# Patient Record
Sex: FEMALE | Race: WHITE | NOT HISPANIC OR LATINO | Employment: OTHER | URBAN - METROPOLITAN AREA
[De-identification: names, ages, dates, MRNs, and addresses within clinical notes are randomized per-mention and may not be internally consistent; named-entity substitution may affect disease eponyms.]

---

## 2018-01-31 ENCOUNTER — OFFICE VISIT (OUTPATIENT)
Dept: INTERNAL MEDICINE | Facility: CLINIC | Age: 53
End: 2018-01-31

## 2018-01-31 VITALS
SYSTOLIC BLOOD PRESSURE: 100 MMHG | BODY MASS INDEX: 26.09 KG/M2 | HEIGHT: 64 IN | DIASTOLIC BLOOD PRESSURE: 64 MMHG | WEIGHT: 152.8 LBS | TEMPERATURE: 98.9 F | RESPIRATION RATE: 16 BRPM | HEART RATE: 74 BPM

## 2018-01-31 DIAGNOSIS — G47.00 INSOMNIA, UNSPECIFIED TYPE: ICD-10-CM

## 2018-01-31 DIAGNOSIS — R07.9 CHEST PAIN, UNSPECIFIED TYPE: ICD-10-CM

## 2018-01-31 DIAGNOSIS — R06.83 SNORING: ICD-10-CM

## 2018-01-31 DIAGNOSIS — F41.9 ANXIETY: Primary | ICD-10-CM

## 2018-01-31 DIAGNOSIS — R53.83 FATIGUE, UNSPECIFIED TYPE: ICD-10-CM

## 2018-01-31 DIAGNOSIS — N60.19 FIBROCYSTIC BREAST DISEASE (FCBD), UNSPECIFIED LATERALITY: ICD-10-CM

## 2018-01-31 DIAGNOSIS — R20.2 TINGLING SENSATION: ICD-10-CM

## 2018-01-31 DIAGNOSIS — Z83.3 FHX: DIABETES MELLITUS: ICD-10-CM

## 2018-01-31 DIAGNOSIS — F17.200 SMOKER: ICD-10-CM

## 2018-01-31 DIAGNOSIS — M54.9 MID BACK PAIN: ICD-10-CM

## 2018-01-31 LAB
ALBUMIN SERPL-MCNC: 4.5 G/DL (ref 3.2–4.8)
ALBUMIN/GLOB SERPL: 1.7 G/DL (ref 1.5–2.5)
ALP SERPL-CCNC: 98 U/L (ref 25–100)
ALT SERPL W P-5'-P-CCNC: 25 U/L (ref 7–40)
ANION GAP SERPL CALCULATED.3IONS-SCNC: 4 MMOL/L (ref 3–11)
AST SERPL-CCNC: 25 U/L (ref 0–33)
BASOPHILS # BLD AUTO: 0.02 10*3/MM3 (ref 0–0.2)
BASOPHILS NFR BLD AUTO: 0.2 % (ref 0–1)
BILIRUB BLD-MCNC: NEGATIVE MG/DL
BILIRUB SERPL-MCNC: 0.7 MG/DL (ref 0.3–1.2)
BUN BLD-MCNC: 15 MG/DL (ref 9–23)
BUN/CREAT SERPL: 18.8 (ref 7–25)
CALCIUM SPEC-SCNC: 9.4 MG/DL (ref 8.7–10.4)
CHLORIDE SERPL-SCNC: 109 MMOL/L (ref 99–109)
CLARITY, POC: CLEAR
CO2 SERPL-SCNC: 27 MMOL/L (ref 20–31)
COLOR UR: YELLOW
CREAT BLD-MCNC: 0.8 MG/DL (ref 0.6–1.3)
DEPRECATED RDW RBC AUTO: 48 FL (ref 37–54)
EOSINOPHIL # BLD AUTO: 0.05 10*3/MM3 (ref 0–0.3)
EOSINOPHIL NFR BLD AUTO: 0.5 % (ref 0–3)
ERYTHROCYTE [DISTWIDTH] IN BLOOD BY AUTOMATED COUNT: 13.4 % (ref 11.3–14.5)
EXPIRATION DATE: NORMAL
GFR SERPL CREATININE-BSD FRML MDRD: 76 ML/MIN/1.73
GLOBULIN UR ELPH-MCNC: 2.7 GM/DL
GLUCOSE BLD-MCNC: 88 MG/DL (ref 70–100)
GLUCOSE UR STRIP-MCNC: NEGATIVE MG/DL
HCT VFR BLD AUTO: 43.2 % (ref 34.5–44)
HGB BLD-MCNC: 14.5 G/DL (ref 11.5–15.5)
IMM GRANULOCYTES # BLD: 0.01 10*3/MM3 (ref 0–0.03)
IMM GRANULOCYTES NFR BLD: 0.1 % (ref 0–0.6)
KETONES UR QL: NEGATIVE
LEUKOCYTE EST, POC: NEGATIVE
LYMPHOCYTES # BLD AUTO: 2.94 10*3/MM3 (ref 0.6–4.8)
LYMPHOCYTES NFR BLD AUTO: 31.5 % (ref 24–44)
Lab: NORMAL
MCH RBC QN AUTO: 33.1 PG (ref 27–31)
MCHC RBC AUTO-ENTMCNC: 33.6 G/DL (ref 32–36)
MCV RBC AUTO: 98.6 FL (ref 80–99)
MONOCYTES # BLD AUTO: 0.5 10*3/MM3 (ref 0–1)
MONOCYTES NFR BLD AUTO: 5.4 % (ref 0–12)
NEUTROPHILS # BLD AUTO: 5.8 10*3/MM3 (ref 1.5–8.3)
NEUTROPHILS NFR BLD AUTO: 62.3 % (ref 41–71)
NITRITE UR-MCNC: NEGATIVE MG/ML
PH UR: 5 [PH] (ref 5–8)
PLATELET # BLD AUTO: 223 10*3/MM3 (ref 150–450)
PMV BLD AUTO: 10.1 FL (ref 6–12)
POTASSIUM BLD-SCNC: 4.1 MMOL/L (ref 3.5–5.5)
PROT SERPL-MCNC: 7.2 G/DL (ref 5.7–8.2)
PROT UR STRIP-MCNC: NEGATIVE MG/DL
RBC # BLD AUTO: 4.38 10*6/MM3 (ref 3.89–5.14)
RBC # UR STRIP: NEGATIVE /UL
SODIUM BLD-SCNC: 140 MMOL/L (ref 132–146)
SP GR UR: 1 (ref 1–1.03)
TSH SERPL DL<=0.05 MIU/L-ACNC: 1.11 MIU/ML (ref 0.35–5.35)
UROBILINOGEN UR QL: NORMAL
VIT B12 BLD-MCNC: 586 PG/ML (ref 211–911)
WBC NRBC COR # BLD: 9.32 10*3/MM3 (ref 3.5–10.8)

## 2018-01-31 PROCEDURE — 99204 OFFICE O/P NEW MOD 45 MIN: CPT | Performed by: NURSE PRACTITIONER

## 2018-01-31 PROCEDURE — 80053 COMPREHEN METABOLIC PANEL: CPT | Performed by: NURSE PRACTITIONER

## 2018-01-31 PROCEDURE — 85025 COMPLETE CBC W/AUTO DIFF WBC: CPT | Performed by: NURSE PRACTITIONER

## 2018-01-31 PROCEDURE — 84443 ASSAY THYROID STIM HORMONE: CPT | Performed by: NURSE PRACTITIONER

## 2018-01-31 PROCEDURE — 99407 BEHAV CHNG SMOKING > 10 MIN: CPT | Performed by: NURSE PRACTITIONER

## 2018-01-31 PROCEDURE — 93000 ELECTROCARDIOGRAM COMPLETE: CPT | Performed by: NURSE PRACTITIONER

## 2018-01-31 PROCEDURE — 82607 VITAMIN B-12: CPT | Performed by: NURSE PRACTITIONER

## 2018-01-31 RX ORDER — BUPROPION HYDROCHLORIDE 150 MG/1
150 TABLET ORAL DAILY
Qty: 30 TABLET | Refills: 0 | Status: SHIPPED | OUTPATIENT
Start: 2018-01-31 | End: 2019-01-29

## 2018-01-31 RX ORDER — ASPIRIN 81 MG/1
81 TABLET, CHEWABLE ORAL DAILY PRN
COMMUNITY
End: 2020-03-17

## 2018-01-31 RX ORDER — ASPIRIN 325 MG
325 TABLET ORAL DAILY PRN
COMMUNITY
End: 2018-08-15

## 2018-01-31 RX ORDER — AMOXICILLIN 250 MG
1 CAPSULE ORAL DAILY PRN
COMMUNITY
End: 2019-01-29

## 2018-02-01 ENCOUNTER — TELEPHONE (OUTPATIENT)
Dept: INTERNAL MEDICINE | Facility: CLINIC | Age: 53
End: 2018-02-01

## 2018-02-01 NOTE — TELEPHONE ENCOUNTER
----- Message from Layla Pardo V sent at 2/1/2018  8:06 AM EST -----  Pt called and is asking for lab results from 1/31/18.    Please call her at [phone]

## 2018-02-14 ENCOUNTER — TELEPHONE (OUTPATIENT)
Dept: INTERNAL MEDICINE | Facility: CLINIC | Age: 53
End: 2018-02-14

## 2018-02-14 ENCOUNTER — OFFICE VISIT (OUTPATIENT)
Dept: INTERNAL MEDICINE | Facility: CLINIC | Age: 53
End: 2018-02-14

## 2018-02-14 VITALS
TEMPERATURE: 98.9 F | BODY MASS INDEX: 26.5 KG/M2 | WEIGHT: 154.4 LBS | HEART RATE: 74 BPM | RESPIRATION RATE: 18 BRPM | DIASTOLIC BLOOD PRESSURE: 70 MMHG | SYSTOLIC BLOOD PRESSURE: 92 MMHG

## 2018-02-14 DIAGNOSIS — R06.83 SNORES: ICD-10-CM

## 2018-02-14 DIAGNOSIS — F17.200 SMOKER: ICD-10-CM

## 2018-02-14 DIAGNOSIS — R53.83 FATIGUE, UNSPECIFIED TYPE: Primary | ICD-10-CM

## 2018-02-14 PROCEDURE — 99406 BEHAV CHNG SMOKING 3-10 MIN: CPT | Performed by: NURSE PRACTITIONER

## 2018-02-14 PROCEDURE — 99213 OFFICE O/P EST LOW 20 MIN: CPT | Performed by: NURSE PRACTITIONER

## 2018-02-14 NOTE — PROGRESS NOTES
Chief Complaint   Patient presents with   • Anxiety     2 week follow up        Subjective     History of Present Illness       Seen 2 wk ago to [facility].    Pt had anx/dep  Cp ekg stable -pain in chronic  Labs cmp cbc  tsh reviewed wnl  Back pain  TA 1ppd    Snores/sleep disturubance    fatigue 3-4months unchanged    Did see derm had cryo to areas. Sees agin one yr    epworth sleep study completed and pt positive risk for CHANTE       The following portions of the patient's history were reviewed and updated as appropriate: allergies, current medications, past family history, past medical history, past social history, past surgical history and problem list.    Review of Systems   Constitutional: Positive for fatigue.   All other systems reviewed and are negative.          Objective   Physical Exam   Constitutional: She is oriented to person, place, and time. She appears well-developed and well-nourished.   HENT:   Head: Normocephalic and atraumatic.   Right Ear: Hearing, tympanic membrane, external ear and ear canal normal.   Left Ear: Hearing, tympanic membrane, external ear and ear canal normal.   Nose: Nose normal. Right sinus exhibits no maxillary sinus tenderness and no frontal sinus tenderness. Left sinus exhibits no maxillary sinus tenderness and no frontal sinus tenderness.   Mouth/Throat: Uvula is midline, oropharynx is clear and moist and mucous membranes are normal.   Eyes: Conjunctivae are normal. Pupils are equal, round, and reactive to light. Right eye exhibits no discharge. Left eye exhibits no discharge. No scleral icterus.   Neck: Normal range of motion. Neck supple. No thyromegaly present.   Cardiovascular: Normal rate, regular rhythm, normal heart sounds and intact distal pulses.  Exam reveals no friction rub.    No murmur heard.  Pulmonary/Chest: Effort normal and breath sounds normal.   Abdominal: Soft. Bowel sounds are normal. She exhibits no distension and no mass. There is no tenderness. There  is no rebound and no guarding. No hernia.   Musculoskeletal: Normal range of motion. She exhibits no edema.   Lymphadenopathy:     She has no cervical adenopathy.   Neurological: She is alert and oriented to person, place, and time. She has normal reflexes.   Skin: Skin is warm and dry.   Psychiatric: She has a normal mood and affect. Her behavior is normal. Judgment and thought content normal.   Nursing note and vitals reviewed.           Assessment/Plan   Lisa was seen today for anxiety.    Diagnoses and all orders for this visit:    Fatigue, unspecified type  -     Home Sleep Study; Future    Snores  -     Home Sleep Study; Future    Smoking cessation with 3-10 minutes is discussion regarding the risk of continued smoking and benefits of cessation and reduction option vs cessation and meds to use with cessation were reviewed.  Goal to reduce cigarettes by 3 per day were set pt v/u.     Reviewed labs with pt   Start Wellbutrin for current symptoms which could also help with Tobacco cessation  Set up sleep study if fatigue persist with possible treatment then further eval.      Return in about 6 weeks (around 3/28/2018) for fasting, Annual.  RTC/call  If symptoms worsen  Meds MOA and SE's reviewed and pt v/u

## 2018-03-06 ENCOUNTER — HOSPITAL ENCOUNTER (OUTPATIENT)
Dept: SLEEP MEDICINE | Facility: HOSPITAL | Age: 53
Discharge: HOME | End: 2018-03-06
Admitting: NURSE PRACTITIONER

## 2018-03-06 VITALS
HEART RATE: 67 BPM | BODY MASS INDEX: 27.5 KG/M2 | HEIGHT: 63 IN | SYSTOLIC BLOOD PRESSURE: 99 MMHG | DIASTOLIC BLOOD PRESSURE: 63 MMHG | OXYGEN SATURATION: 97 % | WEIGHT: 155.2 LBS

## 2018-03-06 DIAGNOSIS — R53.83 FATIGUE, UNSPECIFIED TYPE: ICD-10-CM

## 2018-03-06 DIAGNOSIS — R06.83 SNORES: ICD-10-CM

## 2018-03-06 PROCEDURE — 95806 SLEEP STUDY UNATT&RESP EFFT: CPT

## 2018-03-06 PROCEDURE — 95806 SLEEP STUDY UNATT&RESP EFFT: CPT | Performed by: INTERNAL MEDICINE

## 2018-03-16 ENCOUNTER — CONSULT (OUTPATIENT)
Dept: SLEEP MEDICINE | Facility: HOSPITAL | Age: 53
End: 2018-03-16

## 2018-03-16 VITALS
SYSTOLIC BLOOD PRESSURE: 112 MMHG | BODY MASS INDEX: 26.26 KG/M2 | HEIGHT: 64 IN | DIASTOLIC BLOOD PRESSURE: 61 MMHG | HEART RATE: 87 BPM | WEIGHT: 153.8 LBS | OXYGEN SATURATION: 98 %

## 2018-03-16 DIAGNOSIS — F51.04 PSYCHOPHYSIOLOGICAL INSOMNIA: ICD-10-CM

## 2018-03-16 DIAGNOSIS — G47.33 OBSTRUCTIVE SLEEP APNEA, ADULT: ICD-10-CM

## 2018-03-16 PROCEDURE — 99204 OFFICE O/P NEW MOD 45 MIN: CPT | Performed by: INTERNAL MEDICINE

## 2018-03-17 NOTE — PROGRESS NOTES
Subjective   Lisa Limon is a 52 y.o. female is being seen for consultation today at the request of SUSAN Siegel  for the evaluation of trouble falling asleep and staying asleep.  She also has a history of snoring.    History of Present Illness  Patient complains of feeling exhausted all the time and not sleeping well.  She says she's noted this for at least 2 years.  Says once she gets to sleep she often has awakened almost every hour.  She's had snoring noted for 10 years.  She denies any told that she had apneas.  She does awaken gasping for breath.  She says she's not rested in the morning.  She has a morning headache 2-3 days per week.  She will fall asleep if sitting quietly during the day.  She denies any trouble while driving.  She is sleepy even if she increases her time in bed.    She has history of loud snoring she apparently snores in all positions.  She is awakened with a dry mouth and awakened choking with sore throat she has trouble breathing through her nose both day and night but denies ever breaking her nose.  She denies any reflux symptoms she denies hypnagogic hallucinations sleep paralysis.  She has occasional jerking of her legs at night but says it does not keep her awake.  She is been evaluated medically for her daytime fatigue set her blood work was okay.  She does have some back pain seems to bother her at night she occasionally awakens due to the pain.  Her weight has increased 15 pounds in the past year.    She goes to bed between 11 PM and 2 AM.  She says take her 1-2 hours to go to sleep.  She awakens almost every hour and she thinks she gets 3-4 hours of sleep.  She also occasionally naps for an hour and a half during the day.  She denies any history of hypertension diabetes or coronary artery disease.  She is not on any sleeping medications at this time.  Allergies   Allergen Reactions   • Other Anaphylaxis     IV Contrast   • Amoxicillin Rash and Other (See Comments)      Rash and yeast infection   • Hydrocodone-Acetaminophen Rash   • Tetanus Toxoids Rash and GI Intolerance          Current Outpatient Prescriptions:   •  aspirin 325 MG tablet, Take 325 mg by mouth Daily As Needed for Mild Pain ., Disp: , Rfl:   •  aspirin 81 MG chewable tablet, Chew 81 mg Daily As Needed for Mild Pain ., Disp: , Rfl:   •  Cobalamine Combinations (B-12) 100-5000 MCG sublingual tablet, Place 1 tablet under the tongue Daily As Needed., Disp: , Rfl:   •  Multiple Vitamins-Minerals (MULTIVITAMIN ADULT PO), Take 1 tablet by mouth Daily., Disp: , Rfl:   •  buPROPion XL (WELLBUTRIN XL) 150 MG 24 hr tablet, Take 1 tablet by mouth Daily., Disp: 30 tablet, Rfl: 0    Smoking status: Current Every Day Smoker                                                   Packs/day: 0.75     Years: 32.00      Smokeless tobacco: Never Used                           History   Alcohol Use No       Caffeine: She has one cola and 1 cup of decaf coffee per day.    Past Medical History:   Diagnosis Date   • Acid reflux    • Colon polyps    • Depression    • Gall stones    • Ovarian cyst    • Pancreatitis    • Ulcer    • UTI (urinary tract infection)        Past Surgical History:   Procedure Laterality Date   • CHOLECYSTECTOMY  2007   • HYSTERECTOMY  2010   • TUBAL ABDOMINAL LIGATION  1994       Family History   Problem Relation Age of Onset   • Migraines Mother    • Heart attack Father    • Diabetes Paternal Aunt    • Diabetes Paternal Uncle    Family history is also positive for COPD and her father has obstructive sleep apnea.    The following portions of the patient's history were reviewed and updated as appropriate: allergies, current medications, past family history, past medical history, past social history, past surgical history and problem list.    Review of Systems   Constitutional: Positive for fatigue and unexpected weight change.   HENT: Positive for ear discharge, ear pain and sinus pressure.    Eyes: Positive for visual  "disturbance.   Respiratory: Negative.    Cardiovascular: Positive for chest pain and leg swelling.   Gastrointestinal: Positive for constipation, nausea and vomiting.   Endocrine: Negative.    Genitourinary: Negative.    Musculoskeletal: Positive for arthralgias, back pain and myalgias.   Skin: Negative.    Allergic/Immunologic: Negative.    Neurological: Positive for dizziness, light-headedness, numbness and headaches.   Hematological: Negative.    Psychiatric/Behavioral: The patient is nervous/anxious.     Calvin scores 12/24    Objective     /61   Pulse 87   Ht 162.6 cm (64\")   Wt 69.8 kg (153 lb 12.8 oz)   SpO2 98%   BMI 26.40 kg/m²      Physical Exam   Constitutional: She is oriented to person, place, and time. She appears well-developed and well-nourished.   She is mildly overweight.   HENT:   Head: Normocephalic and atraumatic.   She has nasal airway narrowing with Mallampati class IV anatomy and mild retrognathia   Eyes: EOM are normal. Pupils are equal, round, and reactive to light.   Neck: Normal range of motion. Neck supple.   Cardiovascular: Normal rate, regular rhythm and normal heart sounds.    Pulmonary/Chest: Effort normal. She has wheezes.   She has a few slight expiratory wheezes   Abdominal: Soft. Bowel sounds are normal.   Musculoskeletal: Normal range of motion. She exhibits no edema.   Neurological: She is alert and oriented to person, place, and time.   Skin: Skin is warm and dry.   Psychiatric: She has a normal mood and affect. Her behavior is normal.    patient had home sleep testing on March 7.  Overall she had an AHI of 11.3.  This is consistent with mild obstructive sleep apnea.  When supine her index is 12.1.  On her left side, her index was 3.4.  On her right side her index was 15.6.  She had oxygen desaturations to 69% and spent 94 minutes or 25% of her time in bed in desaturated state.      Assessment/Plan   Lisa was seen today for sleeping problem.    Diagnoses and " all orders for this visit:    Obstructive sleep apnea, adult  -     Detailed AutoPAP Order    Psychophysiological insomnia     patient does have mild obstructive sleep apnea with significant nocturnal hypoxemia.  We've discussed possible therapies including CPAP, weight control, oral appliances, and surgery.  She is willing to try CPAP.  We will place her on an AutoSet device with the fullface mask.  We will have her return then in 2 months and we will download the device to make sure it is controlling her events.  If she is able to tolerate the CPAP we will check an overnight oximetry to make certain were correcting nocturnal hypoxemia.  She is encouraged to lose weight.  She is encouraged to avoid alcohol and sedatives close to bedtime.  She is encouraged practice lateral position sleep.  She is to contact us if symptoms worsen.         Kevyn Hodge MD [facility]  Sleep Medicine  Pulmonary and Critical Care Medicine

## 2018-07-04 ENCOUNTER — HOSPITAL ENCOUNTER (EMERGENCY)
Facility: HOSPITAL | Age: 53
Discharge: HOME | End: 2018-07-05
Attending: EMERGENCY MEDICINE | Admitting: EMERGENCY MEDICINE

## 2018-07-04 ENCOUNTER — APPOINTMENT (OUTPATIENT)
Dept: GENERAL RADIOLOGY | Facility: HOSPITAL | Age: 53
End: 2018-07-04

## 2018-07-04 DIAGNOSIS — K29.00 ACUTE SUPERFICIAL GASTRITIS WITHOUT HEMORRHAGE: Primary | ICD-10-CM

## 2018-07-04 LAB
ALBUMIN SERPL-MCNC: 4.3 G/DL (ref 3.2–4.8)
ALBUMIN/GLOB SERPL: 1.5 G/DL (ref 1.5–2.5)
ALP SERPL-CCNC: 106 U/L (ref 25–100)
ALT SERPL W P-5'-P-CCNC: 16 U/L (ref 7–40)
ANION GAP SERPL CALCULATED.3IONS-SCNC: 10 MMOL/L (ref 3–11)
AST SERPL-CCNC: 19 U/L (ref 0–33)
BACTERIA UR QL AUTO: NORMAL /HPF
BASOPHILS # BLD AUTO: 0.04 10*3/MM3 (ref 0–0.2)
BASOPHILS NFR BLD AUTO: 0.4 % (ref 0–1)
BILIRUB SERPL-MCNC: 0.4 MG/DL (ref 0.3–1.2)
BILIRUB UR QL STRIP: NEGATIVE
BUN BLD-MCNC: 15 MG/DL (ref 9–23)
BUN/CREAT SERPL: 19.2 (ref 7–25)
CALCIUM SPEC-SCNC: 9 MG/DL (ref 8.7–10.4)
CHLORIDE SERPL-SCNC: 106 MMOL/L (ref 99–109)
CLARITY UR: CLEAR
CO2 SERPL-SCNC: 24 MMOL/L (ref 20–31)
COLOR UR: YELLOW
CREAT BLD-MCNC: 0.78 MG/DL (ref 0.6–1.3)
DEPRECATED RDW RBC AUTO: 46.3 FL (ref 37–54)
EOSINOPHIL # BLD AUTO: 0.11 10*3/MM3 (ref 0–0.3)
EOSINOPHIL NFR BLD AUTO: 1 % (ref 0–3)
ERYTHROCYTE [DISTWIDTH] IN BLOOD BY AUTOMATED COUNT: 13 % (ref 11.3–14.5)
GFR SERPL CREATININE-BSD FRML MDRD: 78 ML/MIN/1.73
GLOBULIN UR ELPH-MCNC: 2.9 GM/DL
GLUCOSE BLD-MCNC: 102 MG/DL (ref 70–100)
GLUCOSE UR STRIP-MCNC: NEGATIVE MG/DL
HCT VFR BLD AUTO: 43.8 % (ref 34.5–44)
HGB BLD-MCNC: 14.7 G/DL (ref 11.5–15.5)
HGB UR QL STRIP.AUTO: NEGATIVE
HYALINE CASTS UR QL AUTO: NORMAL /LPF
IMM GRANULOCYTES # BLD: 0.01 10*3/MM3 (ref 0–0.03)
IMM GRANULOCYTES NFR BLD: 0.1 % (ref 0–0.6)
KETONES UR QL STRIP: NEGATIVE
LEUKOCYTE ESTERASE UR QL STRIP.AUTO: ABNORMAL
LIPASE SERPL-CCNC: 43 U/L (ref 6–51)
LYMPHOCYTES # BLD AUTO: 3.92 10*3/MM3 (ref 0.6–4.8)
LYMPHOCYTES NFR BLD AUTO: 36.7 % (ref 24–44)
MCH RBC QN AUTO: 32.8 PG (ref 27–31)
MCHC RBC AUTO-ENTMCNC: 33.6 G/DL (ref 32–36)
MCV RBC AUTO: 97.8 FL (ref 80–99)
MONOCYTES # BLD AUTO: 0.57 10*3/MM3 (ref 0–1)
MONOCYTES NFR BLD AUTO: 5.3 % (ref 0–12)
NEUTROPHILS # BLD AUTO: 6.02 10*3/MM3 (ref 1.5–8.3)
NEUTROPHILS NFR BLD AUTO: 56.5 % (ref 41–71)
NITRITE UR QL STRIP: NEGATIVE
PH UR STRIP.AUTO: 5.5 [PH] (ref 5–8)
PLATELET # BLD AUTO: 213 10*3/MM3 (ref 150–450)
PMV BLD AUTO: 9.6 FL (ref 6–12)
POTASSIUM BLD-SCNC: 3.7 MMOL/L (ref 3.5–5.5)
PROT SERPL-MCNC: 7.2 G/DL (ref 5.7–8.2)
PROT UR QL STRIP: NEGATIVE
RBC # BLD AUTO: 4.48 10*6/MM3 (ref 3.89–5.14)
RBC # UR: NORMAL /HPF
REF LAB TEST METHOD: NORMAL
SODIUM BLD-SCNC: 140 MMOL/L (ref 132–146)
SP GR UR STRIP: 1.01 (ref 1–1.03)
SQUAMOUS #/AREA URNS HPF: NORMAL /HPF
TROPONIN I SERPL-MCNC: <0.006 NG/ML
UROBILINOGEN UR QL STRIP: ABNORMAL
WBC NRBC COR # BLD: 10.67 10*3/MM3 (ref 3.5–10.8)
WBC UR QL AUTO: NORMAL /HPF

## 2018-07-04 PROCEDURE — 84484 ASSAY OF TROPONIN QUANT: CPT | Performed by: EMERGENCY MEDICINE

## 2018-07-04 PROCEDURE — 93005 ELECTROCARDIOGRAM TRACING: CPT

## 2018-07-04 PROCEDURE — 83690 ASSAY OF LIPASE: CPT

## 2018-07-04 PROCEDURE — 96361 HYDRATE IV INFUSION ADD-ON: CPT

## 2018-07-04 PROCEDURE — 80053 COMPREHEN METABOLIC PANEL: CPT

## 2018-07-04 PROCEDURE — 81001 URINALYSIS AUTO W/SCOPE: CPT

## 2018-07-04 PROCEDURE — 96374 THER/PROPH/DIAG INJ IV PUSH: CPT

## 2018-07-04 PROCEDURE — 96375 TX/PRO/DX INJ NEW DRUG ADDON: CPT

## 2018-07-04 PROCEDURE — 25010000002 MORPHINE PER 10 MG: Performed by: EMERGENCY MEDICINE

## 2018-07-04 PROCEDURE — 85025 COMPLETE CBC W/AUTO DIFF WBC: CPT

## 2018-07-04 PROCEDURE — 71045 X-RAY EXAM CHEST 1 VIEW: CPT

## 2018-07-04 PROCEDURE — 99284 EMERGENCY DEPT VISIT MOD MDM: CPT

## 2018-07-04 PROCEDURE — 25010000002 ONDANSETRON PER 1 MG: Performed by: EMERGENCY MEDICINE

## 2018-07-04 RX ORDER — ONDANSETRON 2 MG/ML
4 INJECTION INTRAMUSCULAR; INTRAVENOUS ONCE
Status: COMPLETED | OUTPATIENT
Start: 2018-07-04 | End: 2018-07-04

## 2018-07-04 RX ORDER — MORPHINE SULFATE 4 MG/ML
4 INJECTION, SOLUTION INTRAMUSCULAR; INTRAVENOUS ONCE
Status: COMPLETED | OUTPATIENT
Start: 2018-07-04 | End: 2018-07-04

## 2018-07-04 RX ORDER — KETOROLAC TROMETHAMINE 15 MG/ML
15 INJECTION, SOLUTION INTRAMUSCULAR; INTRAVENOUS ONCE
Status: COMPLETED | OUTPATIENT
Start: 2018-07-04 | End: 2018-07-05

## 2018-07-04 RX ORDER — SODIUM CHLORIDE 0.9 % (FLUSH) 0.9 %
10 SYRINGE (ML) INJECTION AS NEEDED
Status: DISCONTINUED | OUTPATIENT
Start: 2018-07-04 | End: 2018-07-05 | Stop reason: HOSPADM

## 2018-07-04 RX ORDER — DIPHENHYDRAMINE HYDROCHLORIDE 50 MG/ML
25 INJECTION INTRAMUSCULAR; INTRAVENOUS ONCE
Status: COMPLETED | OUTPATIENT
Start: 2018-07-04 | End: 2018-07-05

## 2018-07-04 RX ORDER — METOCLOPRAMIDE HYDROCHLORIDE 5 MG/ML
10 INJECTION INTRAMUSCULAR; INTRAVENOUS ONCE
Status: COMPLETED | OUTPATIENT
Start: 2018-07-04 | End: 2018-07-05

## 2018-07-04 RX ORDER — ALUMINA, MAGNESIA, AND SIMETHICONE 2400; 2400; 240 MG/30ML; MG/30ML; MG/30ML
15 SUSPENSION ORAL ONCE
Status: COMPLETED | OUTPATIENT
Start: 2018-07-04 | End: 2018-07-04

## 2018-07-04 RX ADMIN — SODIUM CHLORIDE 1000 ML: 9 INJECTION, SOLUTION INTRAVENOUS at 22:54

## 2018-07-04 RX ADMIN — ALUMINUM HYDROXIDE, MAGNESIUM HYDROXIDE, AND DIMETHICONE 15 ML: 400; 400; 40 SUSPENSION ORAL at 23:04

## 2018-07-04 RX ADMIN — ONDANSETRON 4 MG: 2 INJECTION INTRAMUSCULAR; INTRAVENOUS at 23:04

## 2018-07-04 RX ADMIN — MORPHINE SULFATE 4 MG: 4 INJECTION, SOLUTION INTRAMUSCULAR; INTRAVENOUS at 23:02

## 2018-07-04 RX ADMIN — LIDOCAINE HYDROCHLORIDE 15 ML: 20 SOLUTION ORAL; TOPICAL at 23:04

## 2018-07-05 ENCOUNTER — APPOINTMENT (OUTPATIENT)
Dept: CT IMAGING | Facility: HOSPITAL | Age: 53
End: 2018-07-05

## 2018-07-05 VITALS
HEART RATE: 66 BPM | OXYGEN SATURATION: 92 % | BODY MASS INDEX: 25.61 KG/M2 | SYSTOLIC BLOOD PRESSURE: 101 MMHG | HEIGHT: 64 IN | DIASTOLIC BLOOD PRESSURE: 67 MMHG | TEMPERATURE: 98 F | RESPIRATION RATE: 12 BRPM | WEIGHT: 150 LBS

## 2018-07-05 LAB
HOLD SPECIMEN: NORMAL
HOLD SPECIMEN: NORMAL
WHOLE BLOOD HOLD SPECIMEN: NORMAL
WHOLE BLOOD HOLD SPECIMEN: NORMAL

## 2018-07-05 PROCEDURE — 74176 CT ABD & PELVIS W/O CONTRAST: CPT

## 2018-07-05 PROCEDURE — 25010000002 DIPHENHYDRAMINE PER 50 MG: Performed by: EMERGENCY MEDICINE

## 2018-07-05 PROCEDURE — 25010000002 METOCLOPRAMIDE PER 10 MG: Performed by: EMERGENCY MEDICINE

## 2018-07-05 PROCEDURE — 96375 TX/PRO/DX INJ NEW DRUG ADDON: CPT

## 2018-07-05 PROCEDURE — 25010000002 KETOROLAC TROMETHAMINE PER 15 MG: Performed by: EMERGENCY MEDICINE

## 2018-07-05 RX ORDER — OMEPRAZOLE 40 MG/1
40 CAPSULE, DELAYED RELEASE ORAL DAILY
Qty: 30 CAPSULE | Refills: 0 | Status: SHIPPED | OUTPATIENT
Start: 2018-07-05 | End: 2018-08-04

## 2018-07-05 RX ORDER — SUCRALFATE ORAL 1 G/10ML
1 SUSPENSION ORAL
Qty: 20 ML | Refills: 0 | Status: SHIPPED | OUTPATIENT
Start: 2018-07-05 | End: 2018-07-15

## 2018-07-05 RX ADMIN — DIPHENHYDRAMINE HYDROCHLORIDE 25 MG: 50 INJECTION INTRAMUSCULAR; INTRAVENOUS at 00:02

## 2018-07-05 RX ADMIN — KETOROLAC TROMETHAMINE 15 MG: 15 INJECTION, SOLUTION INTRAMUSCULAR; INTRAVENOUS at 00:01

## 2018-07-05 RX ADMIN — METOCLOPRAMIDE 10 MG: 5 INJECTION, SOLUTION INTRAMUSCULAR; INTRAVENOUS at 00:05

## 2018-07-05 NOTE — DISCHARGE INSTRUCTIONS
Patient is advised to drink plenty of fluids.     Avoid fatty, acidic, or spicy foods.     Avoid NSAIDS, such as ibuprofen or naprosyn, avoid smoking and alcohol use.     Keep head of bed elevated to help prevent reflux.     Follow-up with PCP for repeat evaluation in 1 week.

## 2018-07-05 NOTE — ED PROVIDER NOTES
Subjective   Lisa Limon is a 52 y.o.female who presents to the ED with complaints of abdominal pain. The patient says she had a Mammogram 7 days ago and then that night she had some chest pain. She attributed it to the mammogram, took an aspirin, and then went to sleep. She woke up and had pain in her right upper quadrant and substernal chest. 5 days ago she woke up again and her pain was intolerable. She wanted to go to the hospital, but instead she laid on couch with heating pad but it didn't help any. 4 and 3 days ago she rated this pain as a 3/10 and that it was not as severe. Two days ago the pain began to be much more severe and she says all the blood in her body rushed to her face and she got hot and started shaking really bad in her bilateral hands and lower extremities. She checked her blood sugar in fear of having hypoglycemia, but it was normal. The pain has since persisted and she notes it has radiated some to her right flank.  She says she has been able to pass gas and has had more frequent bowel movements. She denies having any melena, blood in stool, shortness of breath, or urinary symptoms. She has a surgical history significant for a tubule, cholecystectomy, and a hysterectomy. She denies having any history of kidney stones or alcoholism. There are no other acute complaints at this time.                     History provided by:  Patient  Abdominal Pain   Pain location:  RUQ and epigastric  Pain radiates to:  R shoulder  Pain severity:  Moderate  Onset quality:  Sudden  Duration:  1 week  Timing:  Intermittent  Progression:  Waxing and waning  Chronicity:  New  Relieved by:  Nothing  Worsened by:  Nothing  Ineffective treatments:  NSAIDs and heat  Associated symptoms: chest pain    Associated symptoms: no dysuria, no hematochezia, no hematuria, no melena and no shortness of breath        Review of Systems   Respiratory: Negative for shortness of breath.    Cardiovascular: Positive for chest  pain.   Gastrointestinal: Positive for abdominal pain. Negative for hematochezia and melena.   Genitourinary: Positive for flank pain. Negative for difficulty urinating, dysuria, frequency and hematuria.   All other systems reviewed and are negative.      Past Medical History:   Diagnosis Date   • Acid reflux    • Colon polyps    • Depression    • Gall stones    • Ovarian cyst    • Pancreatitis    • Ulcer (CMS/HCC)    • UTI (urinary tract infection)        Allergies   Allergen Reactions   • Other Anaphylaxis     IV Contrast   • Amoxicillin Rash and Other (See Comments)     Rash and yeast infection   • Hydrocodone-Acetaminophen Rash   • Tetanus Toxoids Rash and GI Intolerance       Past Surgical History:   Procedure Laterality Date   • CHOLECYSTECTOMY  2007   • HYSTERECTOMY  2010   • TUBAL ABDOMINAL LIGATION  1994       Family History   Problem Relation Age of Onset   • Migraines Mother    • Heart attack Father    • Diabetes Paternal Aunt    • Diabetes Paternal Uncle        Social History     Social History   • Marital status:      Social History Main Topics   • Smoking status: Current Every Day Smoker   • Smokeless tobacco: Never Used   • Alcohol use No   • Drug use: No     Other Topics Concern   • Not on file         Objective   Physical Exam   Constitutional: She is oriented to person, place, and time. She appears well-developed and well-nourished. No distress.   HENT:   Head: Normocephalic and atraumatic.   Nose: Nose normal.   Eyes: Conjunctivae are normal. No scleral icterus.   Neck: Normal range of motion. Neck supple.   Cardiovascular: Normal rate, regular rhythm and normal heart sounds.    No murmur heard.  Pulmonary/Chest: Effort normal and breath sounds normal. No respiratory distress.   Abdominal: Soft. Bowel sounds are normal. There is tenderness in the epigastric area.   Mild epigastric tenderness to palpation.    Musculoskeletal: Normal range of motion. She exhibits no edema.   Neurological: She  is alert and oriented to person, place, and time.   Skin: Skin is warm and dry.   Psychiatric: She has a normal mood and affect. Her behavior is normal.   Nursing note and vitals reviewed.      Procedures         ED Course         Recent Results (from the past 24 hour(s))   Light Blue Top    Collection Time: 07/04/18 11:03 PM   Result Value Ref Range    Extra Tube hold for add-on    Lavender Top    Collection Time: 07/04/18 11:03 PM   Result Value Ref Range    Extra Tube hold for add-on    Gold Top - SST    Collection Time: 07/04/18 11:03 PM   Result Value Ref Range    Extra Tube Hold for add-ons.    CBC Auto Differential    Collection Time: 07/04/18 11:03 PM   Result Value Ref Range    WBC 10.67 3.50 - 10.80 10*3/mm3    RBC 4.48 3.89 - 5.14 10*6/mm3    Hemoglobin 14.7 11.5 - 15.5 g/dL    Hematocrit 43.8 34.5 - 44.0 %    MCV 97.8 80.0 - 99.0 fL    MCH 32.8 (H) 27.0 - 31.0 pg    MCHC 33.6 32.0 - 36.0 g/dL    RDW 13.0 11.3 - 14.5 %    RDW-SD 46.3 37.0 - 54.0 fl    MPV 9.6 6.0 - 12.0 fL    Platelets 213 150 - 450 10*3/mm3    Neutrophil % 56.5 41.0 - 71.0 %    Lymphocyte % 36.7 24.0 - 44.0 %    Monocyte % 5.3 0.0 - 12.0 %    Eosinophil % 1.0 0.0 - 3.0 %    Basophil % 0.4 0.0 - 1.0 %    Immature Grans % 0.1 0.0 - 0.6 %    Neutrophils, Absolute 6.02 1.50 - 8.30 10*3/mm3    Lymphocytes, Absolute 3.92 0.60 - 4.80 10*3/mm3    Monocytes, Absolute 0.57 0.00 - 1.00 10*3/mm3    Eosinophils, Absolute 0.11 0.00 - 0.30 10*3/mm3    Basophils, Absolute 0.04 0.00 - 0.20 10*3/mm3    Immature Grans, Absolute 0.01 0.00 - 0.03 10*3/mm3   Comprehensive Metabolic Panel    Collection Time: 07/04/18 11:04 PM   Result Value Ref Range    Glucose 102 (H) 70 - 100 mg/dL    BUN 15 9 - 23 mg/dL    Creatinine 0.78 0.60 - 1.30 mg/dL    Sodium 140 132 - 146 mmol/L    Potassium 3.7 3.5 - 5.5 mmol/L    Chloride 106 99 - 109 mmol/L    CO2 24.0 20.0 - 31.0 mmol/L    Calcium 9.0 8.7 - 10.4 mg/dL    Total Protein 7.2 5.7 - 8.2 g/dL    Albumin 4.30 3.20 -  4.80 g/dL    ALT (SGPT) 16 7 - 40 U/L    AST (SGOT) 19 0 - 33 U/L    Alkaline Phosphatase 106 (H) 25 - 100 U/L    Total Bilirubin 0.4 0.3 - 1.2 mg/dL    eGFR Non African Amer 78 >60 mL/min/1.73    Globulin 2.9 gm/dL    A/G Ratio 1.5 1.5 - 2.5 g/dL    BUN/Creatinine Ratio 19.2 7.0 - 25.0    Anion Gap 10.0 3.0 - 11.0 mmol/L   Lipase    Collection Time: 07/04/18 11:04 PM   Result Value Ref Range    Lipase 43 6 - 51 U/L   Green Top (Gel)    Collection Time: 07/04/18 11:04 PM   Result Value Ref Range    Extra Tube Hold for add-ons.    Troponin    Collection Time: 07/04/18 11:04 PM   Result Value Ref Range    Troponin I <0.006 <=0.039 ng/mL   Urinalysis With Microscopic If Indicated (No Culture) - Urine, Clean Catch    Collection Time: 07/04/18 11:25 PM   Result Value Ref Range    Color, UA Yellow Yellow, Straw    Appearance, UA Clear Clear    pH, UA 5.5 5.0 - 8.0    Specific Gravity, UA 1.008 1.001 - 1.030    Glucose, UA Negative Negative    Ketones, UA Negative Negative    Bilirubin, UA Negative Negative    Blood, UA Negative Negative    Protein, UA Negative Negative    Leuk Esterase, UA Trace (A) Negative    Nitrite, UA Negative Negative    Urobilinogen, UA 0.2 E.U./dL 0.2 - 1.0 E.U./dL   Urinalysis, Microscopic Only - Urine, Clean Catch    Collection Time: 07/04/18 11:25 PM   Result Value Ref Range    RBC, UA 0-2 None Seen, 0-2 /HPF    WBC, UA 0-2 None Seen, 0-2 /HPF    Bacteria, UA None Seen None Seen, Trace /HPF    Squamous Epithelial Cells, UA 0-2 None Seen, 0-2 /HPF    Hyaline Casts, UA None Seen 0 - 6 /LPF    Methodology Automated Microscopy      Note: In addition to lab results from this visit, the labs listed above may include labs taken at another facility or during a different encounter within the last 24 hours. Please correlate lab times with ED admission and discharge times for further clarification of the services performed during this visit.    CT Abdomen Pelvis Without Contrast   Final Result     No  "acute findings visualized in the abdomen or pelvis.      THIS DOCUMENT HAS BEEN ELECTRONICALLY SIGNED BY SCOTTY BORRERO MD      XR Chest 1 View   Final Result     No acute findings visualized within the chest.      THIS DOCUMENT HAS BEEN ELECTRONICALLY SIGNED BY SCOTTY BORRERO MD        Vitals:    07/04/18 2159 07/04/18 2235   BP: 129/75    Pulse: 80    Resp: 12    Temp: 98 °F (36.7 °C)    SpO2: 98%    Weight:  68 kg (150 lb)   Height:  162.6 cm (64\")     Medications   sodium chloride 0.9 % flush 10 mL (not administered)   sodium chloride 0.9 % bolus 1,000 mL (1,000 mL Intravenous New Bag 7/4/18 2254)   Morphine sulfate (PF) injection 4 mg (4 mg Intravenous Given 7/4/18 2302)   ondansetron (ZOFRAN) injection 4 mg (4 mg Intravenous Given 7/4/18 2304)   aluminum-magnesium hydroxide-simethicone (MAALOX MAX) 400-400-40 MG/5ML suspension 15 mL (15 mL Oral Given 7/4/18 2304)   lidocaine viscous (XYLOCAINE) 2 % mouth solution 15 mL (15 mL Mouth/Throat Given 7/4/18 2304)   ketorolac (TORADOL) injection 15 mg (15 mg Intravenous Given 7/5/18 0001)   metoclopramide (REGLAN) injection 10 mg (10 mg Intravenous Given 7/5/18 0005)   diphenhydrAMINE (BENADRYL) injection 25 mg (25 mg Intravenous Given 7/5/18 0002)     ECG/EMG Results (last 24 hours)     ** No results found for the last 24 hours. **                    MDM  Number of Diagnoses or Management Options  Acute superficial gastritis without hemorrhage: new and requires workup  Diagnosis management comments: No acute or significant abnormalities on CT imaging or labs.     Presentation consistent with gastritis/dyspepsia.     Patient will be given prilosec and carafate. Advised to avoid spicy, acidic, spicy, or fatty foods.     Avoid NSAIDS, alcohol, and smoking.     Follow-up with PCP for repeat evaluation in 1 week.        Amount and/or Complexity of Data Reviewed  Clinical lab tests: ordered and reviewed  Tests in the radiology section of CPT®: ordered and reviewed  Obtain history " from someone other than the patient: yes  Review and summarize past medical records: yes  Independent visualization of images, tracings, or specimens: yes    Patient Progress  Patient progress: stable      Final diagnoses:   Acute superficial gastritis without hemorrhage       Documentation assistance provided by kely Blevins.  Information recorded by the scribe was done at my direction and has been verified and validated by me.     Jake Blevins  07/04/18 0445       Leslie Almendarez MD  07/05/18 8226

## 2018-08-15 ENCOUNTER — HOSPITAL ENCOUNTER (EMERGENCY)
Facility: HOSPITAL | Age: 53
Discharge: HOME | End: 2018-08-15
Attending: EMERGENCY MEDICINE | Admitting: EMERGENCY MEDICINE

## 2018-08-15 ENCOUNTER — APPOINTMENT (OUTPATIENT)
Dept: GENERAL RADIOLOGY | Facility: HOSPITAL | Age: 53
End: 2018-08-15

## 2018-08-15 VITALS
BODY MASS INDEX: 25.61 KG/M2 | RESPIRATION RATE: 18 BRPM | HEART RATE: 65 BPM | DIASTOLIC BLOOD PRESSURE: 75 MMHG | TEMPERATURE: 98.2 F | OXYGEN SATURATION: 98 % | SYSTOLIC BLOOD PRESSURE: 105 MMHG | WEIGHT: 150 LBS | HEIGHT: 64 IN

## 2018-08-15 DIAGNOSIS — R07.9 CHEST PAIN, UNSPECIFIED TYPE: Primary | ICD-10-CM

## 2018-08-15 LAB
ALBUMIN SERPL-MCNC: 4.77 G/DL (ref 3.2–4.8)
ALBUMIN/GLOB SERPL: 1.6 G/DL (ref 1.5–2.5)
ALP SERPL-CCNC: 111 U/L (ref 25–100)
ALT SERPL W P-5'-P-CCNC: 19 U/L (ref 7–40)
ANION GAP SERPL CALCULATED.3IONS-SCNC: 7 MMOL/L (ref 3–11)
AST SERPL-CCNC: 21 U/L (ref 0–33)
BASOPHILS # BLD AUTO: 0.01 10*3/MM3 (ref 0–0.2)
BASOPHILS NFR BLD AUTO: 0.1 % (ref 0–1)
BILIRUB SERPL-MCNC: 0.9 MG/DL (ref 0.3–1.2)
BNP SERPL-MCNC: 8 PG/ML (ref 0–100)
BUN BLD-MCNC: 12 MG/DL (ref 9–23)
BUN/CREAT SERPL: 14.1 (ref 7–25)
CALCIUM SPEC-SCNC: 9.8 MG/DL (ref 8.7–10.4)
CHLORIDE SERPL-SCNC: 105 MMOL/L (ref 99–109)
CO2 SERPL-SCNC: 26 MMOL/L (ref 20–31)
CREAT BLD-MCNC: 0.85 MG/DL (ref 0.6–1.3)
DEPRECATED RDW RBC AUTO: 46.4 FL (ref 37–54)
EOSINOPHIL # BLD AUTO: 0.05 10*3/MM3 (ref 0–0.3)
EOSINOPHIL NFR BLD AUTO: 0.6 % (ref 0–3)
ERYTHROCYTE [DISTWIDTH] IN BLOOD BY AUTOMATED COUNT: 13.2 % (ref 11.3–14.5)
GFR SERPL CREATININE-BSD FRML MDRD: 71 ML/MIN/1.73
GLOBULIN UR ELPH-MCNC: 3 GM/DL
GLUCOSE BLD-MCNC: 95 MG/DL (ref 70–100)
HCT VFR BLD AUTO: 45.3 % (ref 34.5–44)
HGB BLD-MCNC: 15.3 G/DL (ref 11.5–15.5)
HOLD SPECIMEN: NORMAL
HOLD SPECIMEN: NORMAL
IMM GRANULOCYTES # BLD: 0.01 10*3/MM3 (ref 0–0.03)
IMM GRANULOCYTES NFR BLD: 0.1 % (ref 0–0.6)
LIPASE SERPL-CCNC: 49 U/L (ref 6–51)
LYMPHOCYTES # BLD AUTO: 2.39 10*3/MM3 (ref 0.6–4.8)
LYMPHOCYTES NFR BLD AUTO: 29.6 % (ref 24–44)
MCH RBC QN AUTO: 32.4 PG (ref 27–31)
MCHC RBC AUTO-ENTMCNC: 33.8 G/DL (ref 32–36)
MCV RBC AUTO: 96 FL (ref 80–99)
MONOCYTES # BLD AUTO: 0.45 10*3/MM3 (ref 0–1)
MONOCYTES NFR BLD AUTO: 5.6 % (ref 0–12)
NEUTROPHILS # BLD AUTO: 5.17 10*3/MM3 (ref 1.5–8.3)
NEUTROPHILS NFR BLD AUTO: 64.1 % (ref 41–71)
PLATELET # BLD AUTO: 217 10*3/MM3 (ref 150–450)
PMV BLD AUTO: 9.7 FL (ref 6–12)
POTASSIUM BLD-SCNC: 4 MMOL/L (ref 3.5–5.5)
PROT SERPL-MCNC: 7.8 G/DL (ref 5.7–8.2)
RBC # BLD AUTO: 4.72 10*6/MM3 (ref 3.89–5.14)
SODIUM BLD-SCNC: 138 MMOL/L (ref 132–146)
TROPONIN I SERPL-MCNC: 0 NG/ML (ref 0–0.07)
TROPONIN I SERPL-MCNC: 0 NG/ML (ref 0–0.07)
WBC NRBC COR # BLD: 8.07 10*3/MM3 (ref 3.5–10.8)
WHOLE BLOOD HOLD SPECIMEN: NORMAL
WHOLE BLOOD HOLD SPECIMEN: NORMAL

## 2018-08-15 PROCEDURE — 93005 ELECTROCARDIOGRAM TRACING: CPT

## 2018-08-15 PROCEDURE — 84484 ASSAY OF TROPONIN QUANT: CPT

## 2018-08-15 PROCEDURE — 99284 EMERGENCY DEPT VISIT MOD MDM: CPT

## 2018-08-15 PROCEDURE — 71045 X-RAY EXAM CHEST 1 VIEW: CPT

## 2018-08-15 PROCEDURE — 85025 COMPLETE CBC W/AUTO DIFF WBC: CPT

## 2018-08-15 PROCEDURE — 93005 ELECTROCARDIOGRAM TRACING: CPT | Performed by: EMERGENCY MEDICINE

## 2018-08-15 PROCEDURE — 83880 ASSAY OF NATRIURETIC PEPTIDE: CPT

## 2018-08-15 PROCEDURE — 83690 ASSAY OF LIPASE: CPT

## 2018-08-15 PROCEDURE — 80053 COMPREHEN METABOLIC PANEL: CPT

## 2018-08-15 RX ORDER — SODIUM CHLORIDE 0.9 % (FLUSH) 0.9 %
10 SYRINGE (ML) INJECTION AS NEEDED
Status: DISCONTINUED | OUTPATIENT
Start: 2018-08-15 | End: 2018-08-15 | Stop reason: HOSPADM

## 2018-08-15 RX ORDER — HYDROXYZINE PAMOATE 50 MG/1
50 CAPSULE ORAL 3 TIMES DAILY PRN
Qty: 25 CAPSULE | Refills: 0 | Status: SHIPPED | OUTPATIENT
Start: 2018-08-15 | End: 2019-01-29

## 2018-08-15 RX ORDER — ASPIRIN 81 MG/1
324 TABLET, CHEWABLE ORAL ONCE
Status: COMPLETED | OUTPATIENT
Start: 2018-08-15 | End: 2018-08-15

## 2018-08-15 RX ADMIN — ASPIRIN 81 MG 324 MG: 81 TABLET ORAL at 16:48

## 2018-08-15 NOTE — DISCHARGE INSTRUCTIONS
Avoid caffeine.  Return if you develop persistent chest discomfort.    Utilize hydroxyzine as needed if you are feeling anxious.    Utilize over-the-counter Pepcid twice a day for the next week then as needed ongoing.    Follow-up with her primary care provider, next available.  If you do not have a primary care provider, Follow up with one of the [facility] physician groups below to setup primary care. If you have trouble following up, please call Tatyana Watkins, our transitional care nurse, at [phone].    (Dr. Michael, Dr. Walker, Dr. Akers, and Dr. Huerta.)  [facility], Primary Care, [phone], [address], [place] [address]    [facility], Primary Care, [phone], [address] [place], [address] [facility], Primary Care, [phone], [address], [address] [place], [address] [facility], Primary Care, [phone], [address], [address] [place], [address]     [place] [address] [facility], Primary Care, [phone], [address], [address] [place], [address]    [place] [address] [facility], Primary Care, [phone], [address]    [place], [address] [facility], Primary Care, [phone], [address], [address] [place], [address] [facility] Medical [facility], Primary Care, [phone], [address], [address] [place], [address] [facility]) [facility], Primary Care, [phone], [address], [address] [place], [address] [facility], Primary Care, [phone], [address], [address] [place], [address] [facility], Primary Care, [phone],  [address], [place], [address] [place] [address], Primary Care, [phone], [address] [facility], [place] [address]

## 2018-08-15 NOTE — ED PROVIDER NOTES
"Subjective   Lisa Limon is a 52 y.o.female who presents to the ED with c/o chest pain with onset several months ago. She has had an episode of chest pain 3 months ago and went to [facility] where she was diagnosed with an anxiety attack. She reports has a long history of anxiety and has been on multiple medications but stopped all anxiety medications including Clonopin 3 years ago. She feels that her current chest symptoms are different than her previous anxiety attacks. She states that she will \"feel my heart stop beating then beat real weird\" and this sensation will last for several seconds and happen multiple times a day. She also will experience left sided anterior chest tightness that will last for a minute then will experience shooting pains to her neck then as if \"all of my blood will rush to my face\" then will become anxious, light-headed and nauseated. She had an episode of nausea, light-headedness and fatigue 4 days ago. She experienced an episode of left sided chest tightness and nausea while at [employer]. Today, she felt near-syncopal after giving her grandchild a bowel of cereal. She drinks a cup of decaf rabia in the morning and a can of Dr. Pepper in the day. She denies any drug use and currently smokes 1/2 pack of cigarettes a day but is attempting to stop. She denies any current anti-anxiety medication use or acid blocker use. She reports a h/o HLD but denies DM or MI. She has a paternal h/o cardiac death at age 51 and maternal h/o CAD and cardiac stents at age 56.             History provided by:  Patient  Chest Pain   Pain location:  L chest  Pain quality: tightness    Pain radiates to:  Neck  Pain severity:  Moderate  Onset quality:  Sudden  Timing:  Intermittent  Progression:  Unchanged  Chronicity:  Recurrent  Context: stress    Relieved by:  Nothing  Worsened by:  Nothing  Ineffective treatments:  None tried  Associated symptoms: anxiety, fatigue and nausea    Risk factors: high cholesterol " and smoking    Risk factors: no diabetes mellitus        Review of Systems   Constitutional: Positive for fatigue.   Cardiovascular: Positive for chest pain.   Gastrointestinal: Positive for nausea.   Neurological: Positive for light-headedness.   All other systems reviewed and are negative.      Past Medical History:   Diagnosis Date   • Acid reflux    • Colon polyps    • Depression    • Gall stones    • Ovarian cyst    • Pancreatitis    • Ulcer (CMS/HCC)    • UTI (urinary tract infection)        Allergies   Allergen Reactions   • Other Anaphylaxis     IV Contrast   • Amoxicillin Rash and Other (See Comments)     Rash and yeast infection   • Hydrocodone-Acetaminophen Rash   • Tetanus Toxoids Rash and GI Intolerance       Past Surgical History:   Procedure Laterality Date   • CHOLECYSTECTOMY  2007   • HYSTERECTOMY  2010   • TUBAL ABDOMINAL LIGATION  1994       Family History   Problem Relation Age of Onset   • Migraines Mother    • Heart attack Father    • Diabetes Paternal Aunt    • Diabetes Paternal Uncle        Social History     Social History   • Marital status:      Social History Main Topics   • Smoking status: Current Every Day Smoker   • Smokeless tobacco: Never Used   • Alcohol use No   • Drug use: No     Other Topics Concern   • Not on file         Objective   Physical Exam   Constitutional: She is oriented to person, place, and time. She appears well-developed and well-nourished. No distress.   HENT:   Head: Normocephalic and atraumatic.   Right Ear: External ear normal.   Left Ear: External ear normal.   Nose: Nose normal.   Eyes: Conjunctivae are normal. No scleral icterus.   Neck: Normal range of motion. Neck supple.   Cardiovascular: Normal rate, regular rhythm and normal heart sounds.    No murmur heard.  Pulmonary/Chest: Effort normal and breath sounds normal. No respiratory distress. She has no wheezes. She has no rales. She exhibits no tenderness.   Abdominal: Soft. Bowel sounds are  "normal. She exhibits no distension. There is no tenderness.   Musculoskeletal: Normal range of motion. She exhibits no edema or tenderness.   Calves are non tender.    Neurological: She is alert and oriented to person, place, and time.   Skin: Skin is warm. She is not diaphoretic.   Psychiatric: She has a normal mood and affect. Her behavior is normal.   Nursing note and vitals reviewed.      Procedures         ED Course      No results found for this or any previous visit (from the past 24 hour(s)).  Note: In addition to lab results from this visit, the labs listed above may include labs taken at another facility or during a different encounter within the last 24 hours. Please correlate lab times with ED admission and discharge times for further clarification of the services performed during this visit.    XR Chest 1 View   Final Result   No acute cardiopulmonary abnormality.       DICTATED:   8/15/2018   EDITED/ls :   8/15/2018        This report was finalized on 8/15/2018 4:08 PM by Yobani Zavala.            Vitals:    08/15/18 1318 08/15/18 1625 08/15/18 1720   BP: 105/65 106/76 105/75   BP Location: Left arm Left arm Left arm   Patient Position: Sitting Lying Lying   Pulse: 89 66 65   Resp: 20  18   Temp: 98.2 °F (36.8 °C)     TempSrc: Oral     SpO2: 93% 98% 98%   Weight: 68 kg (150 lb)     Height: 162.6 cm (64\")       Medications   aspirin chewable tablet 324 mg (324 mg Oral Given 8/15/18 1648)     ECG/EMG Results (last 24 hours)     Procedure Component Value Units Date/Time    ECG 12 Lead [[id number]] Collected:  08/15/18 1613     Updated:  08/15/18 1628    Narrative:       Test Reason : chest pain  Blood Pressure : **/** mmHG  Vent. Rate : 070 BPM     Atrial Rate : 070 BPM     P-R Int : 120 ms          QRS Dur : 074 ms      QT Int : 402 ms       P-R-T Axes : 063 037 054 degrees     QTc Int : 434 ms    Normal sinus rhythm with sinus arrhythmia  When compared with ECG of 15-AUG-2018 13:23, (Unconfirmed)  No " significant change was found  Confirmed by NIGEL ABURTO MD ([id number]) on 8/15/2018 4:28:08 PM    Referred By: MD ER           Confirmed By:NIGEL ABURTO MD    ECG 12 Lead [[id number]] Collected:  08/15/18 1323     Updated:  08/15/18 1628    Narrative:       Test Reason : chest pain  Blood Pressure : **/** mmHG  Vent. Rate : 089 BPM     Atrial Rate : 089 BPM     P-R Int : 104 ms          QRS Dur : 074 ms      QT Int : 362 ms       P-R-T Axes : 073 045 070 degrees     QTc Int : 440 ms    Sinus rhythm with short MN  Otherwise normal ECG  When compared with ECG of 04-JUL-2018 23:21,  No significant change was found  Confirmed by NIGEL ABURTO MD ([id number]) on 8/15/2018 4:28:14 PM    Referred By:  ED MD           Confirmed By:NIGEL ABURTO MD                          [facility]    Final diagnoses:   Chest pain, unspecified type       Documentation assistance provided by kely Drew.  Information recorded by the scribe was done at my direction and has been verified and validated by me.     Jacques Drew  08/15/18 4809       Nigel Aburto MD  08/21/18 7627

## 2018-11-17 ENCOUNTER — HOSPITAL ENCOUNTER (EMERGENCY)
Facility: HOSPITAL | Age: 53
Discharge: HOME | End: 2018-11-17
Attending: EMERGENCY MEDICINE | Admitting: EMERGENCY MEDICINE

## 2018-11-17 ENCOUNTER — APPOINTMENT (OUTPATIENT)
Dept: CT IMAGING | Facility: HOSPITAL | Age: 53
End: 2018-11-17

## 2018-11-17 VITALS
RESPIRATION RATE: 18 BRPM | WEIGHT: 140 LBS | HEART RATE: 88 BPM | SYSTOLIC BLOOD PRESSURE: 112 MMHG | DIASTOLIC BLOOD PRESSURE: 87 MMHG | HEIGHT: 64 IN | TEMPERATURE: 98.5 F | BODY MASS INDEX: 23.9 KG/M2 | OXYGEN SATURATION: 96 %

## 2018-11-17 DIAGNOSIS — R10.9 RIGHT SIDED ABDOMINAL PAIN: Primary | ICD-10-CM

## 2018-11-17 LAB
ALBUMIN SERPL-MCNC: 4.34 G/DL (ref 3.2–4.8)
ALBUMIN/GLOB SERPL: 1.7 G/DL (ref 1.5–2.5)
ALP SERPL-CCNC: 114 U/L (ref 25–100)
ALT SERPL W P-5'-P-CCNC: 15 U/L (ref 7–40)
ANION GAP SERPL CALCULATED.3IONS-SCNC: 2 MMOL/L (ref 3–11)
AST SERPL-CCNC: 22 U/L (ref 0–33)
BASOPHILS # BLD AUTO: 0.02 10*3/MM3 (ref 0–0.2)
BASOPHILS NFR BLD AUTO: 0.2 % (ref 0–1)
BILIRUB SERPL-MCNC: 0.3 MG/DL (ref 0.3–1.2)
BILIRUB UR QL STRIP: NEGATIVE
BUN BLD-MCNC: 15 MG/DL (ref 9–23)
BUN/CREAT SERPL: 20 (ref 7–25)
CALCIUM SPEC-SCNC: 8.9 MG/DL (ref 8.7–10.4)
CHLORIDE SERPL-SCNC: 110 MMOL/L (ref 99–109)
CLARITY UR: CLEAR
CO2 SERPL-SCNC: 25 MMOL/L (ref 20–31)
COLOR UR: YELLOW
CREAT BLD-MCNC: 0.75 MG/DL (ref 0.6–1.3)
DEPRECATED RDW RBC AUTO: 46.7 FL (ref 37–54)
EOSINOPHIL # BLD AUTO: 0.09 10*3/MM3 (ref 0–0.3)
EOSINOPHIL NFR BLD AUTO: 1 % (ref 0–3)
ERYTHROCYTE [DISTWIDTH] IN BLOOD BY AUTOMATED COUNT: 13 % (ref 11.3–14.5)
GFR SERPL CREATININE-BSD FRML MDRD: 81 ML/MIN/1.73
GLOBULIN UR ELPH-MCNC: 2.6 GM/DL
GLUCOSE BLD-MCNC: 101 MG/DL (ref 70–100)
GLUCOSE UR STRIP-MCNC: NEGATIVE MG/DL
HCT VFR BLD AUTO: 41.7 % (ref 34.5–44)
HGB BLD-MCNC: 14 G/DL (ref 11.5–15.5)
HGB UR QL STRIP.AUTO: NEGATIVE
HOLD SPECIMEN: NORMAL
HOLD SPECIMEN: NORMAL
IMM GRANULOCYTES # BLD: 0.02 10*3/MM3 (ref 0–0.03)
IMM GRANULOCYTES NFR BLD: 0.2 % (ref 0–0.6)
KETONES UR QL STRIP: NEGATIVE
LEUKOCYTE ESTERASE UR QL STRIP.AUTO: NEGATIVE
LIPASE SERPL-CCNC: 53 U/L (ref 6–51)
LYMPHOCYTES # BLD AUTO: 2.51 10*3/MM3 (ref 0.6–4.8)
LYMPHOCYTES NFR BLD AUTO: 29.2 % (ref 24–44)
MCH RBC QN AUTO: 32.8 PG (ref 27–31)
MCHC RBC AUTO-ENTMCNC: 33.6 G/DL (ref 32–36)
MCV RBC AUTO: 97.7 FL (ref 80–99)
MONOCYTES # BLD AUTO: 0.54 10*3/MM3 (ref 0–1)
MONOCYTES NFR BLD AUTO: 6.3 % (ref 0–12)
NEUTROPHILS # BLD AUTO: 5.41 10*3/MM3 (ref 1.5–8.3)
NEUTROPHILS NFR BLD AUTO: 63.1 % (ref 41–71)
NITRITE UR QL STRIP: NEGATIVE
PH UR STRIP.AUTO: 5.5 [PH] (ref 5–8)
PLATELET # BLD AUTO: 187 10*3/MM3 (ref 150–450)
PMV BLD AUTO: 9.3 FL (ref 6–12)
POTASSIUM BLD-SCNC: 4.2 MMOL/L (ref 3.5–5.5)
PROT SERPL-MCNC: 6.9 G/DL (ref 5.7–8.2)
PROT UR QL STRIP: NEGATIVE
RBC # BLD AUTO: 4.27 10*6/MM3 (ref 3.89–5.14)
SODIUM BLD-SCNC: 137 MMOL/L (ref 132–146)
SP GR UR STRIP: 1.01 (ref 1–1.03)
UROBILINOGEN UR QL STRIP: NORMAL
WBC NRBC COR # BLD: 8.59 10*3/MM3 (ref 3.5–10.8)
WHOLE BLOOD HOLD SPECIMEN: NORMAL
WHOLE BLOOD HOLD SPECIMEN: NORMAL

## 2018-11-17 PROCEDURE — 83690 ASSAY OF LIPASE: CPT | Performed by: EMERGENCY MEDICINE

## 2018-11-17 PROCEDURE — 96374 THER/PROPH/DIAG INJ IV PUSH: CPT

## 2018-11-17 PROCEDURE — 80053 COMPREHEN METABOLIC PANEL: CPT | Performed by: EMERGENCY MEDICINE

## 2018-11-17 PROCEDURE — 81003 URINALYSIS AUTO W/O SCOPE: CPT | Performed by: EMERGENCY MEDICINE

## 2018-11-17 PROCEDURE — 96375 TX/PRO/DX INJ NEW DRUG ADDON: CPT

## 2018-11-17 PROCEDURE — 99283 EMERGENCY DEPT VISIT LOW MDM: CPT

## 2018-11-17 PROCEDURE — 25010000002 ONDANSETRON PER 1 MG: Performed by: EMERGENCY MEDICINE

## 2018-11-17 PROCEDURE — 25010000002 KETOROLAC TROMETHAMINE PER 15 MG: Performed by: EMERGENCY MEDICINE

## 2018-11-17 PROCEDURE — 74176 CT ABD & PELVIS W/O CONTRAST: CPT

## 2018-11-17 PROCEDURE — 85025 COMPLETE CBC W/AUTO DIFF WBC: CPT | Performed by: EMERGENCY MEDICINE

## 2018-11-17 RX ORDER — SODIUM CHLORIDE 0.9 % (FLUSH) 0.9 %
10 SYRINGE (ML) INJECTION AS NEEDED
Status: DISCONTINUED | OUTPATIENT
Start: 2018-11-17 | End: 2018-11-17 | Stop reason: HOSPADM

## 2018-11-17 RX ORDER — DICLOFENAC SODIUM 75 MG/1
75 TABLET, DELAYED RELEASE ORAL 2 TIMES DAILY
Qty: 14 TABLET | Refills: 0 | Status: SHIPPED | OUTPATIENT
Start: 2018-11-17 | End: 2019-01-29

## 2018-11-17 RX ORDER — KETOROLAC TROMETHAMINE 15 MG/ML
15 INJECTION, SOLUTION INTRAMUSCULAR; INTRAVENOUS ONCE
Status: COMPLETED | OUTPATIENT
Start: 2018-11-17 | End: 2018-11-17

## 2018-11-17 RX ORDER — PROMETHAZINE HYDROCHLORIDE 25 MG/1
25 TABLET ORAL EVERY 6 HOURS PRN
Qty: 15 TABLET | Refills: 0 | Status: SHIPPED | OUTPATIENT
Start: 2018-11-17 | End: 2019-01-29

## 2018-11-17 RX ORDER — ONDANSETRON 2 MG/ML
4 INJECTION INTRAMUSCULAR; INTRAVENOUS ONCE
Status: COMPLETED | OUTPATIENT
Start: 2018-11-17 | End: 2018-11-17

## 2018-11-17 RX ADMIN — ONDANSETRON 4 MG: 2 INJECTION INTRAMUSCULAR; INTRAVENOUS at 15:50

## 2018-11-17 RX ADMIN — KETOROLAC TROMETHAMINE 15 MG: 15 INJECTION, SOLUTION INTRAMUSCULAR; INTRAVENOUS at 15:53

## 2018-11-17 NOTE — ED PROVIDER NOTES
Subjective   Lisa Limon is a 53 y.o. female who presents to the ED with c/o upper abdominal pain with sudden onset 1400. The patient reports that the pain onset suddenly while she was bent over picking up an object on the floor and that it has remained constant. The pain radiates to her right flank and lower back. She denies urinary sx, N/V, or any other acute complaints at this time. She last ate around 1030. Patient is currently not on any blood thinners and has a hx of a cholecystectomy and hysterectomy. No hx of kidney stones.         History provided by:  Patient  Abdominal Pain   Pain location:  R flank, LUQ and RUQ  Pain radiates to:  Back  Onset quality:  Sudden  Duration: Onset 1400.  Timing:  Constant  Progression:  Worsening  Chronicity:  New  Relieved by:  None tried  Worsened by:  Nothing  Ineffective treatments:  None tried  Associated symptoms: no chills, no dysuria, no fever and no hematuria        Review of Systems   Constitutional: Negative for chills and fever.   Gastrointestinal: Positive for abdominal pain.   Genitourinary: Positive for flank pain. Negative for decreased urine volume, difficulty urinating, dysuria, frequency, hematuria and urgency.   Neurological: Positive for headaches.   All other systems reviewed and are negative.      Past Medical History:   Diagnosis Date   • Acid reflux    • Colon polyps    • Depression    • Gall stones    • Ovarian cyst    • Pancreatitis    • Ulcer    • UTI (urinary tract infection)        Allergies   Allergen Reactions   • Other Anaphylaxis     IV Contrast   • Amoxicillin Rash and Other (See Comments)     Rash and yeast infection   • Hydrocodone-Acetaminophen Rash   • Tetanus Toxoids Rash and GI Intolerance       Past Surgical History:   Procedure Laterality Date   • CHOLECYSTECTOMY  2007   • HYSTERECTOMY  2010   • TUBAL ABDOMINAL LIGATION  1994       Family History   Problem Relation Age of Onset   • Migraines Mother    • Heart attack Father    •  Diabetes Paternal Aunt    • Diabetes Paternal Uncle        Social History     Socioeconomic History   • Marital status:      Spouse name: Not on file   • Number of children: Not on file   • Years of education: Not on file   • Highest education level: Not on file   Tobacco Use   • Smoking status: Current Every Day Smoker   • Smokeless tobacco: Never Used   Substance and Sexual Activity   • Alcohol use: No   • Drug use: No   • Sexual activity: Defer         Objective   Physical Exam   Constitutional: She is oriented to person, place, and time. She appears well-developed and well-nourished. No distress.   HENT:   Head: Normocephalic and atraumatic.   Nose: Nose normal.   Eyes: Conjunctivae are normal. No scleral icterus.   Neck: Normal range of motion. Neck supple.   Cardiovascular: Normal rate, regular rhythm and normal heart sounds.   No murmur heard.  Pulmonary/Chest: Effort normal and breath sounds normal. No respiratory distress.   Abdominal: Soft. Bowel sounds are normal. There is no tenderness. There is no rebound and no guarding.   No CVAT.   Musculoskeletal: Normal range of motion. She exhibits no edema.   Neurological: She is alert and oriented to person, place, and time.   Skin: Skin is warm and dry.   Psychiatric: She has a normal mood and affect. Her behavior is normal.   Nursing note and vitals reviewed.      Procedures         ED Course     CT negative.  Labs benign.  UA negative.  Pt better with meds.  Patient stable on serial rechecks.  Discussed findings, concerns, plan of care, expected course, reasons to return and followup.                  MDM  Number of Diagnoses or Management Options  Right sided abdominal pain:      Amount and/or Complexity of Data Reviewed  Clinical lab tests: reviewed and ordered  Tests in the radiology section of CPT®: reviewed and ordered  Independent visualization of images, tracings, or specimens: yes        Final diagnoses:   Right sided abdominal pain        Documentation assistance provided by kely Devine.  Information recorded by the scribe was done at my direction and has been verified and validated by me.     Garrett Devine  11/17/18 1539       Ovidio Michael MD  11/17/18 0559

## 2019-01-29 ENCOUNTER — CONSULT (OUTPATIENT)
Dept: CARDIOLOGY | Facility: CLINIC | Age: 54
End: 2019-01-29

## 2019-01-29 VITALS
HEIGHT: 64 IN | DIASTOLIC BLOOD PRESSURE: 64 MMHG | HEART RATE: 68 BPM | SYSTOLIC BLOOD PRESSURE: 112 MMHG | WEIGHT: 148 LBS | BODY MASS INDEX: 25.27 KG/M2

## 2019-01-29 DIAGNOSIS — R53.83 OTHER FATIGUE: ICD-10-CM

## 2019-01-29 DIAGNOSIS — I20.8 ATYPICAL ANGINA (HCC): Primary | ICD-10-CM

## 2019-01-29 PROBLEM — R42 DIZZINESS: Status: ACTIVE | Noted: 2019-01-29

## 2019-01-29 PROBLEM — R07.9 CHEST PAIN: Status: ACTIVE | Noted: 2019-01-29

## 2019-01-29 PROBLEM — F45.0 SOMATIZATION DISORDER: Status: ACTIVE | Noted: 2019-01-29

## 2019-01-29 PROBLEM — I20.89 ATYPICAL ANGINA: Status: ACTIVE | Noted: 2019-01-29

## 2019-01-29 PROCEDURE — 99204 OFFICE O/P NEW MOD 45 MIN: CPT | Performed by: INTERNAL MEDICINE

## 2019-01-29 PROCEDURE — 93000 ELECTROCARDIOGRAM COMPLETE: CPT | Performed by: INTERNAL MEDICINE

## 2019-01-29 NOTE — ASSESSMENT & PLAN NOTE
Patient's chest pain symptoms are atypical for angina. She does experience fatigue symptoms but they do not sound related to exertion per se. The patient has a family history of precocious coronary artery disease and I think that a repeat ischemic evaluation is reasonable. However, if her testing is unremarkable, noncardiac etiologies for her symptomatology should be pursued once again.

## 2019-01-29 NOTE — PROGRESS NOTES
Encounter Date:01/29/2019    Patient ID: Lisa SANDY is a  53 y.o. female who resides in [place], [place]. She works at [employer].     CC/Reason for visit:  Shortness of Breath; Chest Pain; and Edema           Problem List Items Addressed This Visit        Cardiology Problems    Atypical angina (CMS/HCC) - Primary    Overview     · CTA (7/13/2000): Coronary calcium score 0  · Normal stress echo, April 2012  · Echo (1/2015): LVEF >55%         Current Assessment & Plan     Patient's chest pain symptoms are atypical for angina. She does experience fatigue symptoms but they do not sound related to exertion per se. The patient has a family history of precocious coronary artery disease and I think that a repeat ischemic evaluation is reasonable. However, if her testing is unremarkable, noncardiac etiologies for her symptomatology should be pursued once again.         Relevant Orders    Stress Test With Myocardial Perfusion One Day    Adult Transthoracic Echo Complete W/ Cont if Necessary Per Protocol       Other    Other fatigue    Relevant Orders    Stress Test With Myocardial Perfusion One Day    Adult Transthoracic Echo Complete W/ Cont if Necessary Per Protocol        53-year-old female with atypical chest pain, vague fatigue symptoms. She's had previous workup at  which was unremarkable and her symptoms were suspected to be related to somatization disorder.  Given her family history, I think a repeat ischemic evaluation and echocardiogram are reasonable. However if these tests are unremarkable, noncardiac etiologies for the patient's symptoms should be pursued.       · Exercise nuclear stress test  · Echocardiogram  · If unremarkable, pursue noncardiac etiologies for the patient's symptoms  · Return in about 4 weeks (around 2/26/2019) for Follow-up with SUSAN Espinoza.         Lisa SANDY is referred to our office by Wojciech Encarnacion DO for consultation regarding chest pain symptoms, fatigue, and  "dizziness. The patient is a 53-year-old female who historically has undergone extensive cardiac evaluation at the [facility] dating from 8232-4545. At that time, the patient was complaining of chest pain symptoms, dizziness, and palpitations. She underwent echocardiography, stress echocardiography, and Holter monitoring (capture her rhythm during symptoms).  All of these tests were unremarkable.    The patient states that her chest pain symptoms now are different than in the past. She states that the pains now feel punches to her chest. They do not occur with exertion. They have no relieving factors. She states that she is more fatigued than she has been in the past. She wakes up feeling fatigue and is fatigued throughout the day. She works and states that she walks all day long as part of her job.    The patient states that she previously dealt with significant anxiety and was previously on clonazepam which she is no longer taking. It is notable from review of the notes at the [facility] cardiology clinic that there was concerns of somatization disorder due to her multitude of complaints. She states that her stress is \"much better\".    Review of Systems   Constitution: Positive for malaise/fatigue. Negative for weakness.   Eyes: Negative for vision loss in left eye and vision loss in right eye.   Cardiovascular: Positive for chest pain. Negative for dyspnea on exertion, near-syncope, orthopnea, palpitations, paroxysmal nocturnal dyspnea and syncope.   Musculoskeletal: Negative for myalgias.   Neurological: Positive for excessive daytime sleepiness and dizziness. Negative for brief paralysis, focal weakness, numbness and paresthesias.   All other systems reviewed and are negative.      The patient's past medical, social, family history and ROS reviewed in the patient's electronic medical record.    Allergies  Other; Amoxicillin; Hydrocodone-acetaminophen; and Tetanus toxoids    Outpatient " "Medications Marked as Taking for the 1/29/19 encounter (Consult) with Matty Pace IV, MD   Medication Sig Dispense Refill   • aspirin 81 MG chewable tablet Chew 81 mg Daily As Needed for Mild Pain .     • Cyanocobalamin (B-12) 1000 MCG/ML kit Inject  as directed.     • [DISCONTINUED] Multiple Vitamins-Minerals (MULTIVITAMIN ADULT PO) Take 1 tablet by mouth Daily.           Blood pressure 112/64, pulse 68, height 162.6 cm (64\"), weight 67.1 kg (148 lb).  Body mass index is 25.4 kg/m².       Physical Exam   Constitutional: She is oriented to person, place, and time. She appears well-developed and well-nourished.   HENT:   Head: Normocephalic and atraumatic.   Eyes: Pupils are equal, round, and reactive to light. No scleral icterus.   Neck: No JVD present. Carotid bruit is not present. No thyromegaly present.   Cardiovascular: Normal rate and regular rhythm. Exam reveals no gallop.   No murmur heard.  Pulmonary/Chest: Effort normal and breath sounds normal.   Abdominal: Soft. She exhibits no distension. There is no hepatosplenomegaly.   Musculoskeletal: She exhibits no edema.   Neurological: She is alert and oriented to person, place, and time.   Skin: Skin is warm and dry.   Psychiatric: She has a normal mood and affect. Her behavior is normal.       Data Review:       ECG 12 Lead  Date/Time: 2/5/2019 11:37 AM  Performed by: Matty Pace IV, MD  Authorized by: Matty Pace IV, MD   Comparison: not compared with previous ECG   Previous ECG: no previous ECG available  Rhythm: sinus rhythm  Rate: normal  BPM: 88  QRS axis: normal  Clinical impression: normal ECG            Lab Results   Component Value Date    AST 22 11/17/2018    ALT 15 11/17/2018     Lab Results   Component Value Date    GLUCOSE 101 (H) 11/17/2018    CALCIUM 8.9 11/17/2018     11/17/2018    K 4.2 11/17/2018    CO2 25.0 11/17/2018     (H) 11/17/2018    BUN 15 11/17/2018    CREATININE 0.75 11/17/2018    " EGFRIFNONA 81 11/17/2018    BCR 20.0 11/17/2018    ANIONGAP 2.0 (L) 11/17/2018     Lab Results   Component Value Date    WBC 8.59 11/17/2018    HGB 14.0 11/17/2018    HCT 41.7 11/17/2018    MCV 97.7 11/17/2018     11/17/2018     Lab Results   Component Value Date    TSH 1.113 01/31/2018      Lab Results (08/15/2018):  · WBC - 8.07  · RBC - 4.72  · Hemoglobin - 15.3  · Hematocrit - 45.3  · Glucose - 95  · BUN - 12  · Creat - 0.85  · Sodium - 138  · Potassium - 4.0  · Chloride - 105  · CO2 - 26.0  · Calcium - 9.8  · Total Protein - 7.8  · Albumin 4.77  · ALT (SGPT) - 19  · AST (SGOT) - 21  · Alkaline Phosphatase - 111    Scribed for Matty Pace IV, MD by James Echavarria. 1/29/2019  5:06 PM    Matty Pace IV, MD  1/29/2019

## 2019-02-05 PROCEDURE — 93000 ELECTROCARDIOGRAM COMPLETE: CPT | Performed by: INTERNAL MEDICINE

## 2019-02-28 ENCOUNTER — HOSPITAL ENCOUNTER (OUTPATIENT)
Dept: CARDIOLOGY | Facility: HOSPITAL | Age: 54
Discharge: HOME | End: 2019-02-28
Admitting: INTERNAL MEDICINE

## 2019-02-28 ENCOUNTER — HOSPITAL ENCOUNTER (OUTPATIENT)
Dept: CARDIOLOGY | Facility: HOSPITAL | Age: 54
Discharge: HOME | End: 2019-02-28

## 2019-02-28 VITALS — HEIGHT: 64 IN | WEIGHT: 148 LBS | BODY MASS INDEX: 25.27 KG/M2

## 2019-02-28 DIAGNOSIS — I20.8 ATYPICAL ANGINA (HCC): ICD-10-CM

## 2019-02-28 DIAGNOSIS — R53.83 OTHER FATIGUE: ICD-10-CM

## 2019-02-28 LAB
BH CV ECHO MEAS - AO MAX PG (FULL): 1.9 MMHG
BH CV ECHO MEAS - AO MAX PG: 5.1 MMHG
BH CV ECHO MEAS - AO ROOT AREA (BSA CORRECTED): 1.9
BH CV ECHO MEAS - AO ROOT AREA: 8.1 CM^2
BH CV ECHO MEAS - AO ROOT DIAM: 3.2 CM
BH CV ECHO MEAS - AO V2 MAX: 113.2 CM/SEC
BH CV ECHO MEAS - AVA(V,A): 2.3 CM^2
BH CV ECHO MEAS - AVA(V,D): 2.3 CM^2
BH CV ECHO MEAS - BSA(HAYCOCK): 1.8 M^2
BH CV ECHO MEAS - BSA: 1.7 M^2
BH CV ECHO MEAS - BZI_BMI: 25.4 KILOGRAMS/M^2
BH CV ECHO MEAS - BZI_METRIC_HEIGHT: 162.6 CM
BH CV ECHO MEAS - BZI_METRIC_WEIGHT: 67.1 KG
BH CV ECHO MEAS - EDV(CUBED): 105.3 ML
BH CV ECHO MEAS - EDV(MOD-SP2): 55 ML
BH CV ECHO MEAS - EDV(MOD-SP4): 64 ML
BH CV ECHO MEAS - EDV(TEICH): 103.5 ML
BH CV ECHO MEAS - EF(CUBED): 67.2 %
BH CV ECHO MEAS - EF(MOD-BP): 60 %
BH CV ECHO MEAS - EF(MOD-SP2): 60 %
BH CV ECHO MEAS - EF(MOD-SP4): 57.8 %
BH CV ECHO MEAS - EF(TEICH): 58.7 %
BH CV ECHO MEAS - ESV(CUBED): 34.6 ML
BH CV ECHO MEAS - ESV(MOD-SP2): 22 ML
BH CV ECHO MEAS - ESV(MOD-SP4): 27 ML
BH CV ECHO MEAS - ESV(TEICH): 42.8 ML
BH CV ECHO MEAS - FS: 31 %
BH CV ECHO MEAS - IVS/LVPW: 1
BH CV ECHO MEAS - IVSD: 0.82 CM
BH CV ECHO MEAS - LA DIMENSION: 2.6 CM
BH CV ECHO MEAS - LA/AO: 0.8
BH CV ECHO MEAS - LAD MAJOR: 4.6 CM
BH CV ECHO MEAS - LAT PEAK E' VEL: 14.3 CM/SEC
BH CV ECHO MEAS - LATERAL E/E' RATIO: 4.2
BH CV ECHO MEAS - LV DIASTOLIC VOL/BSA (35-75): 37.2 ML/M^2
BH CV ECHO MEAS - LV MASS(C)D: 126.1 GRAMS
BH CV ECHO MEAS - LV MASS(C)DI: 73.3 GRAMS/M^2
BH CV ECHO MEAS - LV MAX PG: 3.3 MMHG
BH CV ECHO MEAS - LV MEAN PG: 2.1 MMHG
BH CV ECHO MEAS - LV SYSTOLIC VOL/BSA (12-30): 15.7 ML/M^2
BH CV ECHO MEAS - LV V1 MAX: 90.2 CM/SEC
BH CV ECHO MEAS - LV V1 MEAN: 68.9 CM/SEC
BH CV ECHO MEAS - LV V1 VTI: 19.9 CM
BH CV ECHO MEAS - LVIDD: 4.7 CM
BH CV ECHO MEAS - LVIDS: 3.3 CM
BH CV ECHO MEAS - LVLD AP2: 7.7 CM
BH CV ECHO MEAS - LVLD AP4: 7.6 CM
BH CV ECHO MEAS - LVLS AP2: 6.3 CM
BH CV ECHO MEAS - LVLS AP4: 6.3 CM
BH CV ECHO MEAS - LVOT AREA (M): 2.8 CM^2
BH CV ECHO MEAS - LVOT AREA: 2.9 CM^2
BH CV ECHO MEAS - LVOT DIAM: 1.9 CM
BH CV ECHO MEAS - LVPWD: 0.81 CM
BH CV ECHO MEAS - MED PEAK E' VEL: 10.1 CM/SEC
BH CV ECHO MEAS - MEDIAL E/E' RATIO: 6
BH CV ECHO MEAS - MV A MAX VEL: 56.2 CM/SEC
BH CV ECHO MEAS - MV DEC TIME: 0.28 SEC
BH CV ECHO MEAS - MV E MAX VEL: 61.1 CM/SEC
BH CV ECHO MEAS - MV E/A: 1.1
BH CV ECHO MEAS - PA ACC SLOPE: 359.5 CM/SEC^2
BH CV ECHO MEAS - PA ACC TIME: 0.15 SEC
BH CV ECHO MEAS - PA MAX PG: 3 MMHG
BH CV ECHO MEAS - PA PR(ACCEL): 12.5 MMHG
BH CV ECHO MEAS - PA V2 MAX: 87.1 CM/SEC
BH CV ECHO MEAS - RAP SYSTOLE: 3 MMHG
BH CV ECHO MEAS - RVSP: 25 MMHG
BH CV ECHO MEAS - SI(CUBED): 41.1 ML/M^2
BH CV ECHO MEAS - SI(LVOT): 33 ML/M^2
BH CV ECHO MEAS - SI(MOD-SP2): 19.2 ML/M^2
BH CV ECHO MEAS - SI(MOD-SP4): 21.5 ML/M^2
BH CV ECHO MEAS - SI(TEICH): 35.3 ML/M^2
BH CV ECHO MEAS - SV(CUBED): 70.7 ML
BH CV ECHO MEAS - SV(LVOT): 56.8 ML
BH CV ECHO MEAS - SV(MOD-SP2): 33 ML
BH CV ECHO MEAS - SV(MOD-SP4): 37 ML
BH CV ECHO MEAS - SV(TEICH): 60.7 ML
BH CV ECHO MEAS - TAPSE (>1.6): 1.9 CM2
BH CV ECHO MEAS - TR MAX PG: 22 MMHG
BH CV ECHO MEAS - TR MAX VEL: 235.5 CM/SEC
BH CV ECHO MEASUREMENTS AVERAGE E/E' RATIO: 5.01
BH CV STRESS BP STAGE 1: NORMAL
BH CV STRESS BP STAGE 2: NORMAL
BH CV STRESS BP STAGE 3: NORMAL
BH CV STRESS DURATION MIN STAGE 1: 3
BH CV STRESS DURATION MIN STAGE 2: 3
BH CV STRESS DURATION MIN STAGE 3: 3
BH CV STRESS DURATION MIN STAGE 4: 1
BH CV STRESS DURATION SEC STAGE 1: 0
BH CV STRESS DURATION SEC STAGE 2: 0
BH CV STRESS DURATION SEC STAGE 3: 0
BH CV STRESS DURATION SEC STAGE 4: 30
BH CV STRESS GRADE STAGE 1: 10
BH CV STRESS GRADE STAGE 2: 12
BH CV STRESS GRADE STAGE 3: 14
BH CV STRESS GRADE STAGE 4: 16
BH CV STRESS HR STAGE 1: 98
BH CV STRESS HR STAGE 2: 108
BH CV STRESS HR STAGE 3: 137
BH CV STRESS HR STAGE 4: 146
BH CV STRESS METS STAGE 1: 5
BH CV STRESS METS STAGE 2: 7.5
BH CV STRESS METS STAGE 3: 10
BH CV STRESS METS STAGE 4: 13.5
BH CV STRESS PROTOCOL 1: NORMAL
BH CV STRESS RECOVERY BP: NORMAL MMHG
BH CV STRESS RECOVERY HR: 86 BPM
BH CV STRESS SPEED STAGE 1: 1.7
BH CV STRESS SPEED STAGE 2: 2.5
BH CV STRESS SPEED STAGE 3: 3.4
BH CV STRESS SPEED STAGE 4: 4.2
BH CV STRESS STAGE 1: 1
BH CV STRESS STAGE 2: 2
BH CV STRESS STAGE 3: 3
BH CV STRESS STAGE 4: 4
BH CV VAS BP LEFT ARM: NORMAL MMHG
BH CV XLRA - RV BASE: 3 CM
BH CV XLRA - RV LENGTH: 6.8 CM
BH CV XLRA - RV MID: 1.9 CM
BH CV XLRA - TDI S': 13.4 CM/SEC
LEFT ATRIUM VOLUME INDEX: 19.8 ML/M^2
LEFT ATRIUM VOLUME: 34 ML
LV EF 2D ECHO EST: 60 %
LV EF NUC BP: 71 %
MAXIMAL PREDICTED HEART RATE: 169 BPM
MAXIMAL PREDICTED HEART RATE: 169 BPM
PERCENT MAX PREDICTED HR: 97.04 %
STRESS BASELINE BP: NORMAL MMHG
STRESS BASELINE HR: 62 BPM
STRESS PERCENT HR: 114 %
STRESS POST ESTIMATED WORKLOAD: 11 METS
STRESS POST EXERCISE DUR MIN: 10 MIN
STRESS POST EXERCISE DUR SEC: 30 SEC
STRESS POST PEAK BP: NORMAL MMHG
STRESS POST PEAK HR: 164 BPM
STRESS TARGET HR: 144 BPM
STRESS TARGET HR: 144 BPM

## 2019-02-28 PROCEDURE — 93306 TTE W/DOPPLER COMPLETE: CPT

## 2019-02-28 PROCEDURE — A9500 TC99M SESTAMIBI: HCPCS | Performed by: INTERNAL MEDICINE

## 2019-02-28 PROCEDURE — 78452 HT MUSCLE IMAGE SPECT MULT: CPT

## 2019-02-28 PROCEDURE — 93017 CV STRESS TEST TRACING ONLY: CPT

## 2019-02-28 PROCEDURE — 93018 CV STRESS TEST I&R ONLY: CPT | Performed by: INTERNAL MEDICINE

## 2019-02-28 PROCEDURE — 78452 HT MUSCLE IMAGE SPECT MULT: CPT | Performed by: INTERNAL MEDICINE

## 2019-02-28 PROCEDURE — 0 TECHNETIUM SESTAMIBI: Performed by: INTERNAL MEDICINE

## 2019-02-28 PROCEDURE — 93306 TTE W/DOPPLER COMPLETE: CPT | Performed by: INTERNAL MEDICINE

## 2019-02-28 RX ADMIN — TECHNETIUM TC 99M SESTAMIBI 1 DOSE: 1 INJECTION INTRAVENOUS at 09:50

## 2019-02-28 RX ADMIN — TECHNETIUM TC 99M SESTAMIBI 1 DOSE: 1 INJECTION INTRAVENOUS at 07:55

## 2019-03-08 ENCOUNTER — HOSPITAL ENCOUNTER (EMERGENCY)
Facility: HOSPITAL | Age: 54
Discharge: HOME | End: 2019-03-08
Attending: EMERGENCY MEDICINE | Admitting: EMERGENCY MEDICINE

## 2019-03-08 ENCOUNTER — APPOINTMENT (OUTPATIENT)
Dept: GENERAL RADIOLOGY | Facility: HOSPITAL | Age: 54
End: 2019-03-08

## 2019-03-08 VITALS
RESPIRATION RATE: 16 BRPM | OXYGEN SATURATION: 97 % | BODY MASS INDEX: 24.75 KG/M2 | SYSTOLIC BLOOD PRESSURE: 118 MMHG | TEMPERATURE: 97.7 F | WEIGHT: 145 LBS | DIASTOLIC BLOOD PRESSURE: 78 MMHG | HEART RATE: 101 BPM | HEIGHT: 64 IN

## 2019-03-08 DIAGNOSIS — I49.1 PREMATURE ATRIAL CONTRACTION: Primary | ICD-10-CM

## 2019-03-08 DIAGNOSIS — R00.2 HEART PALPITATIONS: ICD-10-CM

## 2019-03-08 LAB
ALBUMIN SERPL-MCNC: 4.52 G/DL (ref 3.2–4.8)
ALBUMIN/GLOB SERPL: 1.7 G/DL (ref 1.5–2.5)
ALP SERPL-CCNC: 114 U/L (ref 25–100)
ALT SERPL W P-5'-P-CCNC: 19 U/L (ref 7–40)
ANION GAP SERPL CALCULATED.3IONS-SCNC: 7 MMOL/L (ref 3–11)
AST SERPL-CCNC: 25 U/L (ref 0–33)
BASOPHILS # BLD AUTO: 0.02 10*3/MM3 (ref 0–0.2)
BASOPHILS NFR BLD AUTO: 0.2 % (ref 0–1)
BILIRUB SERPL-MCNC: 0.4 MG/DL (ref 0.3–1.2)
BNP SERPL-MCNC: 19 PG/ML (ref 0–100)
BUN BLD-MCNC: 14 MG/DL (ref 9–23)
BUN/CREAT SERPL: 18.9 (ref 7–25)
CALCIUM SPEC-SCNC: 9.4 MG/DL (ref 8.7–10.4)
CHLORIDE SERPL-SCNC: 109 MMOL/L (ref 99–109)
CO2 SERPL-SCNC: 24 MMOL/L (ref 20–31)
CREAT BLD-MCNC: 0.74 MG/DL (ref 0.6–1.3)
DEPRECATED RDW RBC AUTO: 45.5 FL (ref 37–54)
EOSINOPHIL # BLD AUTO: 0.08 10*3/MM3 (ref 0–0.3)
EOSINOPHIL NFR BLD AUTO: 0.8 % (ref 0–3)
ERYTHROCYTE [DISTWIDTH] IN BLOOD BY AUTOMATED COUNT: 12.9 % (ref 11.3–14.5)
GFR SERPL CREATININE-BSD FRML MDRD: 83 ML/MIN/1.73
GLOBULIN UR ELPH-MCNC: 2.7 GM/DL
GLUCOSE BLD-MCNC: 99 MG/DL (ref 70–100)
HCT VFR BLD AUTO: 42.2 % (ref 34.5–44)
HGB BLD-MCNC: 14.3 G/DL (ref 11.5–15.5)
HOLD SPECIMEN: NORMAL
HOLD SPECIMEN: NORMAL
IMM GRANULOCYTES # BLD AUTO: 0.01 10*3/MM3 (ref 0–0.05)
IMM GRANULOCYTES NFR BLD AUTO: 0.1 % (ref 0–0.6)
LIPASE SERPL-CCNC: 39 U/L (ref 6–51)
LYMPHOCYTES # BLD AUTO: 2.69 10*3/MM3 (ref 0.6–4.8)
LYMPHOCYTES NFR BLD AUTO: 27.8 % (ref 24–44)
MAGNESIUM SERPL-MCNC: 2.2 MG/DL (ref 1.3–2.7)
MCH RBC QN AUTO: 32.6 PG (ref 27–31)
MCHC RBC AUTO-ENTMCNC: 33.9 G/DL (ref 32–36)
MCV RBC AUTO: 96.3 FL (ref 80–99)
MONOCYTES # BLD AUTO: 0.56 10*3/MM3 (ref 0–1)
MONOCYTES NFR BLD AUTO: 5.8 % (ref 0–12)
NEUTROPHILS # BLD AUTO: 6.3 10*3/MM3 (ref 1.5–8.3)
NEUTROPHILS NFR BLD AUTO: 65.3 % (ref 41–71)
PLATELET # BLD AUTO: 210 10*3/MM3 (ref 150–450)
PMV BLD AUTO: 9.2 FL (ref 6–12)
POTASSIUM BLD-SCNC: 4.2 MMOL/L (ref 3.5–5.5)
PROT SERPL-MCNC: 7.2 G/DL (ref 5.7–8.2)
RBC # BLD AUTO: 4.38 10*6/MM3 (ref 3.89–5.14)
SODIUM BLD-SCNC: 140 MMOL/L (ref 132–146)
T4 FREE SERPL-MCNC: 1.21 NG/DL (ref 0.89–1.76)
TROPONIN I SERPL-MCNC: 0 NG/ML (ref 0–0.07)
TROPONIN I SERPL-MCNC: 0 NG/ML (ref 0–0.07)
TSH SERPL DL<=0.05 MIU/L-ACNC: 1.05 MIU/ML (ref 0.35–5.35)
WBC NRBC COR # BLD: 9.66 10*3/MM3 (ref 3.5–10.8)
WHOLE BLOOD HOLD SPECIMEN: NORMAL
WHOLE BLOOD HOLD SPECIMEN: NORMAL

## 2019-03-08 PROCEDURE — 83735 ASSAY OF MAGNESIUM: CPT | Performed by: EMERGENCY MEDICINE

## 2019-03-08 PROCEDURE — 85025 COMPLETE CBC W/AUTO DIFF WBC: CPT

## 2019-03-08 PROCEDURE — 80053 COMPREHEN METABOLIC PANEL: CPT

## 2019-03-08 PROCEDURE — 93005 ELECTROCARDIOGRAM TRACING: CPT

## 2019-03-08 PROCEDURE — 71045 X-RAY EXAM CHEST 1 VIEW: CPT

## 2019-03-08 PROCEDURE — 83880 ASSAY OF NATRIURETIC PEPTIDE: CPT

## 2019-03-08 PROCEDURE — 93005 ELECTROCARDIOGRAM TRACING: CPT | Performed by: EMERGENCY MEDICINE

## 2019-03-08 PROCEDURE — 84439 ASSAY OF FREE THYROXINE: CPT | Performed by: EMERGENCY MEDICINE

## 2019-03-08 PROCEDURE — 84484 ASSAY OF TROPONIN QUANT: CPT

## 2019-03-08 PROCEDURE — 84443 ASSAY THYROID STIM HORMONE: CPT | Performed by: EMERGENCY MEDICINE

## 2019-03-08 PROCEDURE — 99284 EMERGENCY DEPT VISIT MOD MDM: CPT

## 2019-03-08 PROCEDURE — 83690 ASSAY OF LIPASE: CPT

## 2019-03-08 RX ORDER — ASPIRIN 81 MG/1
324 TABLET, CHEWABLE ORAL ONCE
Status: DISCONTINUED | OUTPATIENT
Start: 2019-03-08 | End: 2019-03-08

## 2019-03-08 RX ORDER — SODIUM CHLORIDE 0.9 % (FLUSH) 0.9 %
10 SYRINGE (ML) INJECTION AS NEEDED
Status: DISCONTINUED | OUTPATIENT
Start: 2019-03-08 | End: 2019-03-08 | Stop reason: HOSPADM

## 2019-03-09 NOTE — ED PROVIDER NOTES
Subjective   Pt reports chest discomfort and palpitations that began today and have improved.  She says a few days ago she began having upper respiratory symptoms - cough, congestion, rhinorrhea, sore throat.  She began taking some clindamycin she had left over from a previous dental infection.  Today, earlier this afternoon she had abrupt onset of tachypalpitations and chest tightness.  This lasted for a while and has resolved.  She has never had anything like this before.  She has a history of B12 deficiency and is currently taking weekly shots for that but otherwise denies maintenance medications.              Review of Systems   HENT: Positive for congestion and sore throat.    Respiratory: Positive for cough and chest tightness.    Cardiovascular: Positive for palpitations.   Gastrointestinal: Negative for diarrhea and vomiting.   All other systems reviewed and are negative.      Past Medical History:   Diagnosis Date   • Acid reflux    • Colon polyps    • Depression    • Gall stones    • Ovarian cyst    • Pancreatitis    • Ulcer    • UTI (urinary tract infection)        Allergies   Allergen Reactions   • Other Anaphylaxis     IV Contrast   • Amoxicillin Rash and Other (See Comments)     Rash and yeast infection   • Hydrocodone-Acetaminophen Rash   • Tetanus Toxoids Rash and GI Intolerance       Past Surgical History:   Procedure Laterality Date   • CHOLECYSTECTOMY  2007   • HYSTERECTOMY  2010   • TUBAL ABDOMINAL LIGATION  1994       Family History   Problem Relation Age of Onset   • Migraines Mother    • Heart attack Father    • Diabetes Paternal Aunt    • Diabetes Paternal Uncle        Social History     Socioeconomic History   • Marital status:      Spouse name: Not on file   • Number of children: Not on file   • Years of education: Not on file   • Highest education level: Not on file   Tobacco Use   • Smoking status: Current Every Day Smoker   • Smokeless tobacco: Never Used   Substance and Sexual  Activity   • Alcohol use: No   • Drug use: Yes     Types: Hydromorphone   • Sexual activity: Defer           Objective   Physical Exam   Constitutional: She appears well-developed and well-nourished. She is cooperative.  Non-toxic appearance. No distress.   HENT:   Head: Normocephalic and atraumatic.   Mouth/Throat: Oropharynx is clear and moist and mucous membranes are normal.   Eyes: Conjunctivae and EOM are normal. No scleral icterus.   Neck: Normal range of motion. Neck supple.   Cardiovascular: Normal rate, regular rhythm and normal heart sounds.   No murmur heard.  Pulmonary/Chest: Effort normal and breath sounds normal. No respiratory distress. She has no wheezes. She has no rhonchi. She has no rales.   Abdominal: Soft. Bowel sounds are normal. There is no tenderness. There is no rebound and no guarding.   Musculoskeletal: Normal range of motion.   Neurological: She is alert. She has normal strength.   Skin: Skin is warm and dry. No rash noted.   Psychiatric: She has a normal mood and affect. Her speech is normal and behavior is normal.   Nursing note and vitals reviewed.      Procedures           ED Course      EKG SR with PACs.  Labs benign.  CXR negative. NSR on the monitor, no ectopy seen.  Patient stable on serial rechecks.  Discussed findings, concerns, plan of care, expected course, reasons to return and followup.  She is quite anxious about this and I did my best to reassure her.   says she just had a negative stress test in the last few weeks.               MDM  Number of Diagnoses or Management Options     Amount and/or Complexity of Data Reviewed  Clinical lab tests: reviewed and ordered  Tests in the radiology section of CPT®: reviewed and ordered  Independent visualization of images, tracings, or specimens: yes          Final diagnoses:   Premature atrial contraction   Heart palpitations            Ovidio Michael MD  03/08/19 1021

## 2019-12-12 ENCOUNTER — APPOINTMENT (OUTPATIENT)
Dept: GENERAL RADIOLOGY | Facility: HOSPITAL | Age: 54
End: 2019-12-12

## 2019-12-12 ENCOUNTER — HOSPITAL ENCOUNTER (EMERGENCY)
Facility: HOSPITAL | Age: 54
Discharge: HOME | End: 2019-12-12
Attending: EMERGENCY MEDICINE | Admitting: EMERGENCY MEDICINE

## 2019-12-12 VITALS
OXYGEN SATURATION: 95 % | DIASTOLIC BLOOD PRESSURE: 91 MMHG | WEIGHT: 145 LBS | RESPIRATION RATE: 16 BRPM | HEART RATE: 58 BPM | TEMPERATURE: 98.7 F | BODY MASS INDEX: 24.75 KG/M2 | HEIGHT: 64 IN | SYSTOLIC BLOOD PRESSURE: 106 MMHG

## 2019-12-12 DIAGNOSIS — R07.9 ACUTE CHEST PAIN: Primary | ICD-10-CM

## 2019-12-12 LAB
ALBUMIN SERPL-MCNC: 4.5 G/DL (ref 3.5–5.2)
ALBUMIN/GLOB SERPL: 1.5 G/DL
ALP SERPL-CCNC: 115 U/L (ref 39–117)
ALT SERPL W P-5'-P-CCNC: 15 U/L (ref 1–33)
ANION GAP SERPL CALCULATED.3IONS-SCNC: 13 MMOL/L (ref 5–15)
AST SERPL-CCNC: 18 U/L (ref 1–32)
BASOPHILS # BLD AUTO: 0.05 10*3/MM3 (ref 0–0.2)
BASOPHILS NFR BLD AUTO: 0.6 % (ref 0–1.5)
BILIRUB SERPL-MCNC: 0.3 MG/DL (ref 0.2–1.2)
BUN BLD-MCNC: 12 MG/DL (ref 6–20)
BUN/CREAT SERPL: 15.2 (ref 7–25)
CALCIUM SPEC-SCNC: 9.6 MG/DL (ref 8.6–10.5)
CHLORIDE SERPL-SCNC: 105 MMOL/L (ref 98–107)
CO2 SERPL-SCNC: 23 MMOL/L (ref 22–29)
CREAT BLD-MCNC: 0.79 MG/DL (ref 0.57–1)
DEPRECATED RDW RBC AUTO: 48.4 FL (ref 37–54)
EOSINOPHIL # BLD AUTO: 0.06 10*3/MM3 (ref 0–0.4)
EOSINOPHIL NFR BLD AUTO: 0.7 % (ref 0.3–6.2)
ERYTHROCYTE [DISTWIDTH] IN BLOOD BY AUTOMATED COUNT: 13.2 % (ref 12.3–15.4)
GFR SERPL CREATININE-BSD FRML MDRD: 76 ML/MIN/1.73
GLOBULIN UR ELPH-MCNC: 3.1 GM/DL
GLUCOSE BLD-MCNC: 140 MG/DL (ref 65–99)
HCT VFR BLD AUTO: 45.9 % (ref 34–46.6)
HGB BLD-MCNC: 14.8 G/DL (ref 12–15.9)
HOLD SPECIMEN: NORMAL
HOLD SPECIMEN: NORMAL
IMM GRANULOCYTES # BLD AUTO: 0.01 10*3/MM3 (ref 0–0.05)
IMM GRANULOCYTES NFR BLD AUTO: 0.1 % (ref 0–0.5)
LIPASE SERPL-CCNC: 49 U/L (ref 13–60)
LYMPHOCYTES # BLD AUTO: 2.18 10*3/MM3 (ref 0.7–3.1)
LYMPHOCYTES NFR BLD AUTO: 25 % (ref 19.6–45.3)
MCH RBC QN AUTO: 32.2 PG (ref 26.6–33)
MCHC RBC AUTO-ENTMCNC: 32.2 G/DL (ref 31.5–35.7)
MCV RBC AUTO: 99.8 FL (ref 79–97)
MONOCYTES # BLD AUTO: 0.39 10*3/MM3 (ref 0.1–0.9)
MONOCYTES NFR BLD AUTO: 4.5 % (ref 5–12)
NEUTROPHILS # BLD AUTO: 6.04 10*3/MM3 (ref 1.7–7)
NEUTROPHILS NFR BLD AUTO: 69.1 % (ref 42.7–76)
NRBC BLD AUTO-RTO: 0 /100 WBC (ref 0–0.2)
NT-PROBNP SERPL-MCNC: 86.6 PG/ML (ref 5–900)
PLATELET # BLD AUTO: 228 10*3/MM3 (ref 140–450)
PMV BLD AUTO: 9.6 FL (ref 6–12)
POTASSIUM BLD-SCNC: 3.7 MMOL/L (ref 3.5–5.2)
PROT SERPL-MCNC: 7.6 G/DL (ref 6–8.5)
RBC # BLD AUTO: 4.6 10*6/MM3 (ref 3.77–5.28)
SODIUM BLD-SCNC: 141 MMOL/L (ref 136–145)
TROPONIN T SERPL-MCNC: <0.01 NG/ML (ref 0–0.03)
TROPONIN T SERPL-MCNC: <0.01 NG/ML (ref 0–0.03)
WBC NRBC COR # BLD: 8.73 10*3/MM3 (ref 3.4–10.8)
WHOLE BLOOD HOLD SPECIMEN: NORMAL
WHOLE BLOOD HOLD SPECIMEN: NORMAL

## 2019-12-12 PROCEDURE — 93005 ELECTROCARDIOGRAM TRACING: CPT | Performed by: EMERGENCY MEDICINE

## 2019-12-12 PROCEDURE — 80053 COMPREHEN METABOLIC PANEL: CPT

## 2019-12-12 PROCEDURE — 84484 ASSAY OF TROPONIN QUANT: CPT | Performed by: EMERGENCY MEDICINE

## 2019-12-12 PROCEDURE — 83880 ASSAY OF NATRIURETIC PEPTIDE: CPT

## 2019-12-12 PROCEDURE — 71046 X-RAY EXAM CHEST 2 VIEWS: CPT

## 2019-12-12 PROCEDURE — 83690 ASSAY OF LIPASE: CPT

## 2019-12-12 PROCEDURE — 85025 COMPLETE CBC W/AUTO DIFF WBC: CPT

## 2019-12-12 PROCEDURE — 99284 EMERGENCY DEPT VISIT MOD MDM: CPT

## 2019-12-12 PROCEDURE — 93005 ELECTROCARDIOGRAM TRACING: CPT

## 2019-12-12 PROCEDURE — 84484 ASSAY OF TROPONIN QUANT: CPT

## 2019-12-12 RX ORDER — SODIUM CHLORIDE 0.9 % (FLUSH) 0.9 %
10 SYRINGE (ML) INJECTION AS NEEDED
Status: DISCONTINUED | OUTPATIENT
Start: 2019-12-12 | End: 2019-12-12 | Stop reason: HOSPADM

## 2020-03-15 ENCOUNTER — HOSPITAL ENCOUNTER (EMERGENCY)
Facility: HOSPITAL | Age: 55
Discharge: HOME | End: 2020-03-16
Attending: EMERGENCY MEDICINE | Admitting: EMERGENCY MEDICINE

## 2020-03-15 ENCOUNTER — APPOINTMENT (OUTPATIENT)
Dept: GENERAL RADIOLOGY | Facility: HOSPITAL | Age: 55
End: 2020-03-15

## 2020-03-15 VITALS
DIASTOLIC BLOOD PRESSURE: 93 MMHG | BODY MASS INDEX: 26.46 KG/M2 | TEMPERATURE: 97.5 F | OXYGEN SATURATION: 99 % | HEIGHT: 64 IN | WEIGHT: 155 LBS | SYSTOLIC BLOOD PRESSURE: 129 MMHG | HEART RATE: 115 BPM | RESPIRATION RATE: 16 BRPM

## 2020-03-15 DIAGNOSIS — F41.9 ANXIETY: ICD-10-CM

## 2020-03-15 DIAGNOSIS — R07.9 NONSPECIFIC CHEST PAIN: Primary | ICD-10-CM

## 2020-03-15 LAB
ALBUMIN SERPL-MCNC: 4.8 G/DL (ref 3.5–5.2)
ALBUMIN/GLOB SERPL: 1.5 G/DL
ALP SERPL-CCNC: 112 U/L (ref 39–117)
ALT SERPL W P-5'-P-CCNC: 16 U/L (ref 1–33)
ANION GAP SERPL CALCULATED.3IONS-SCNC: 13 MMOL/L (ref 5–15)
AST SERPL-CCNC: 20 U/L (ref 1–32)
BASOPHILS # BLD AUTO: 0.04 10*3/MM3 (ref 0–0.2)
BASOPHILS NFR BLD AUTO: 0.3 % (ref 0–1.5)
BILIRUB SERPL-MCNC: 0.6 MG/DL (ref 0.2–1.2)
BUN BLD-MCNC: 10 MG/DL (ref 6–20)
BUN/CREAT SERPL: 13.5 (ref 7–25)
CALCIUM SPEC-SCNC: 9.6 MG/DL (ref 8.6–10.5)
CHLORIDE SERPL-SCNC: 100 MMOL/L (ref 98–107)
CO2 SERPL-SCNC: 24 MMOL/L (ref 22–29)
CREAT BLD-MCNC: 0.74 MG/DL (ref 0.57–1)
D DIMER PPP FEU-MCNC: 0.6 MCGFEU/ML (ref 0–0.56)
DEPRECATED RDW RBC AUTO: 46.4 FL (ref 37–54)
EOSINOPHIL # BLD AUTO: 0.08 10*3/MM3 (ref 0–0.4)
EOSINOPHIL NFR BLD AUTO: 0.7 % (ref 0.3–6.2)
ERYTHROCYTE [DISTWIDTH] IN BLOOD BY AUTOMATED COUNT: 12.6 % (ref 12.3–15.4)
GFR SERPL CREATININE-BSD FRML MDRD: 82 ML/MIN/1.73
GLOBULIN UR ELPH-MCNC: 3.1 GM/DL
GLUCOSE BLD-MCNC: 183 MG/DL (ref 65–99)
HCT VFR BLD AUTO: 45.1 % (ref 34–46.6)
HGB BLD-MCNC: 15.5 G/DL (ref 12–15.9)
HOLD SPECIMEN: NORMAL
HOLD SPECIMEN: NORMAL
IMM GRANULOCYTES # BLD AUTO: 0.03 10*3/MM3 (ref 0–0.05)
IMM GRANULOCYTES NFR BLD AUTO: 0.3 % (ref 0–0.5)
LIPASE SERPL-CCNC: 38 U/L (ref 13–60)
LYMPHOCYTES # BLD AUTO: 3.19 10*3/MM3 (ref 0.7–3.1)
LYMPHOCYTES NFR BLD AUTO: 26.9 % (ref 19.6–45.3)
MCH RBC QN AUTO: 34.1 PG (ref 26.6–33)
MCHC RBC AUTO-ENTMCNC: 34.4 G/DL (ref 31.5–35.7)
MCV RBC AUTO: 99.1 FL (ref 79–97)
MONOCYTES # BLD AUTO: 0.66 10*3/MM3 (ref 0.1–0.9)
MONOCYTES NFR BLD AUTO: 5.6 % (ref 5–12)
NEUTROPHILS # BLD AUTO: 7.88 10*3/MM3 (ref 1.7–7)
NEUTROPHILS NFR BLD AUTO: 66.2 % (ref 42.7–76)
NRBC BLD AUTO-RTO: 0 /100 WBC (ref 0–0.2)
NT-PROBNP SERPL-MCNC: 48 PG/ML (ref 5–900)
PLATELET # BLD AUTO: 240 10*3/MM3 (ref 140–450)
PMV BLD AUTO: 9.2 FL (ref 6–12)
POTASSIUM BLD-SCNC: 3.5 MMOL/L (ref 3.5–5.2)
PROT SERPL-MCNC: 7.9 G/DL (ref 6–8.5)
RBC # BLD AUTO: 4.55 10*6/MM3 (ref 3.77–5.28)
SODIUM BLD-SCNC: 137 MMOL/L (ref 136–145)
TROPONIN T SERPL-MCNC: <0.01 NG/ML (ref 0–0.03)
TROPONIN T SERPL-MCNC: <0.01 NG/ML (ref 0–0.03)
WBC NRBC COR # BLD: 11.88 10*3/MM3 (ref 3.4–10.8)
WHOLE BLOOD HOLD SPECIMEN: NORMAL
WHOLE BLOOD HOLD SPECIMEN: NORMAL

## 2020-03-15 PROCEDURE — 71045 X-RAY EXAM CHEST 1 VIEW: CPT

## 2020-03-15 PROCEDURE — 99283 EMERGENCY DEPT VISIT LOW MDM: CPT

## 2020-03-15 PROCEDURE — 93005 ELECTROCARDIOGRAM TRACING: CPT

## 2020-03-15 PROCEDURE — 93005 ELECTROCARDIOGRAM TRACING: CPT | Performed by: EMERGENCY MEDICINE

## 2020-03-15 PROCEDURE — 84484 ASSAY OF TROPONIN QUANT: CPT | Performed by: EMERGENCY MEDICINE

## 2020-03-15 PROCEDURE — 85025 COMPLETE CBC W/AUTO DIFF WBC: CPT

## 2020-03-15 PROCEDURE — 84484 ASSAY OF TROPONIN QUANT: CPT

## 2020-03-15 PROCEDURE — 83880 ASSAY OF NATRIURETIC PEPTIDE: CPT

## 2020-03-15 PROCEDURE — 80053 COMPREHEN METABOLIC PANEL: CPT

## 2020-03-15 PROCEDURE — 83690 ASSAY OF LIPASE: CPT

## 2020-03-15 PROCEDURE — 85379 FIBRIN DEGRADATION QUANT: CPT | Performed by: EMERGENCY MEDICINE

## 2020-03-15 RX ORDER — HYDROXYZINE PAMOATE 50 MG/1
50 CAPSULE ORAL 3 TIMES DAILY PRN
Qty: 25 CAPSULE | Refills: 0 | OUTPATIENT
Start: 2020-03-15 | End: 2020-03-21

## 2020-03-15 RX ORDER — SODIUM CHLORIDE 0.9 % (FLUSH) 0.9 %
10 SYRINGE (ML) INJECTION AS NEEDED
Status: DISCONTINUED | OUTPATIENT
Start: 2020-03-15 | End: 2020-03-16 | Stop reason: HOSPADM

## 2020-03-15 RX ORDER — HYDROXYZINE HYDROCHLORIDE 25 MG/1
100 TABLET, FILM COATED ORAL ONCE
Status: COMPLETED | OUTPATIENT
Start: 2020-03-15 | End: 2020-03-15

## 2020-03-15 RX ORDER — ASPIRIN 81 MG/1
324 TABLET, CHEWABLE ORAL ONCE
Status: DISCONTINUED | OUTPATIENT
Start: 2020-03-15 | End: 2020-03-16 | Stop reason: HOSPADM

## 2020-03-15 RX ADMIN — HYDROXYZINE HYDROCHLORIDE 100 MG: 25 TABLET, FILM COATED ORAL at 23:10

## 2020-03-16 NOTE — ED PROVIDER NOTES
Subjective   Ms. Lisa Hernandez is a 54 y.o female presenting to the emergency department with complaints of chest pain. She has had intermittent episodes of left chest pain described as burning and pressure since December 2019. During an episode of chest pain yesterday, she took an Aspirin and confirms relief. Today, her episode occurred at rest while she was praying. She had sudden and fast palpitations and began shaking. Her most recent episode of chest pain was today at 1700.     She was in the store 3 months ago when she turned her head to the side and heard a loud humming noise in her ears. She states she became flushed and hot and began panicking. She ran to the restroom and had an episode of diarrhea. Coincidentally, there was a  present who called EMS. On arrival, EMS examined her and believed her new symptoms were due to fluid in her ears. She complains of a persistent fluid-like sensation in her ears since this initial episode. She confirms worsening fatigue, nausea, decreased appetite, lightheadedness, and sleep disturbances over the past week.     She has a history of anxiety for which she was taking Clozapine but she stopped taking this abruptly because she did not appreciate how she felt while on the medication. She is supposed to use a continuous positive airway pressure machine at night but she is not compliant with this. She confirms mild caffeine use and drinks 1 soda per day. She smokes approximately a half of a pack of cigarettes per day. She denies a history of gastroesophageal reflux disease, hypertension, hyperlipidemia, diabetes, drug use, and alcohol use. There are no other acute symptoms at this time.      History provided by:  Patient  Chest Pain   Pain location:  L chest  Pain quality: burning and pressure    Pain radiates to:  Does not radiate  Pain severity:  Severe  Onset quality:  Sudden  Duration:  3 months  Timing:  Intermittent  Progression:  Worsening  Chronicity:   New  Relieved by:  Aspirin  Worsened by:  Nothing  Associated symptoms: anxiety, fatigue, nausea and palpitations    Associated symptoms: no heartburn    Risk factors: smoking    Risk factors: no diabetes mellitus, no high cholesterol and no hypertension        Review of Systems   Constitutional: Positive for appetite change (decreased) and fatigue.   HENT:        Fluid-filled sensation in ears   Cardiovascular: Positive for chest pain and palpitations.   Gastrointestinal: Positive for diarrhea and nausea. Negative for heartburn.   Neurological: Positive for light-headedness.        Shaking   Psychiatric/Behavioral: Positive for sleep disturbance. The patient is nervous/anxious.    All other systems reviewed and are negative.      Past Medical History:   Diagnosis Date   • Acid reflux    • Colon polyps    • Depression    • Gall stones    • Ovarian cyst    • Pancreatitis    • Ulcer    • UTI (urinary tract infection)        Allergies   Allergen Reactions   • Iodinated Diagnostic Agents Anaphylaxis   • Amoxicillin Rash and Other (See Comments)     Rash and yeast infection   • Hydrocodone-Acetaminophen Rash   • Tetanus Toxoids Rash and GI Intolerance       Past Surgical History:   Procedure Laterality Date   • CHOLECYSTECTOMY  2007   • HYSTERECTOMY  2010   • TUBAL ABDOMINAL LIGATION  1994       Family History   Problem Relation Age of Onset   • Migraines Mother    • Heart attack Father    • Diabetes Paternal Aunt    • Diabetes Paternal Uncle        Social History     Socioeconomic History   • Marital status:      Spouse name: Not on file   • Number of children: Not on file   • Years of education: Not on file   • Highest education level: Not on file   Tobacco Use   • Smoking status: Current Every Day Smoker   • Smokeless tobacco: Never Used   Substance and Sexual Activity   • Alcohol use: No   • Drug use: Yes     Types: Hydromorphone   • Sexual activity: Defer         Objective   Physical Exam   Constitutional:  She is oriented to person, place, and time. She appears well-developed and well-nourished. No distress.   She appears very anxious but nontoxic. Throughout the examination, she occasionally hyperventilates and then relaxes.   HENT:   Head: Normocephalic and atraumatic.   Eyes: Conjunctivae are normal. No scleral icterus.   Neck: Normal range of motion.   Cardiovascular: Regular rhythm. Tachycardia present. Exam reveals no gallop and no friction rub.   No murmur heard.  Pulses:       Femoral pulses are 2+ on the right side, and 2+ on the left side.  Pulmonary/Chest: Effort normal and breath sounds normal. No respiratory distress. She has no wheezes. She exhibits no tenderness.   No reproduction of chest discomfort with palpation    Bilateral breast exam is normal.  This was performed with nurse Atkins at bedside.   Abdominal: Soft. There is no tenderness. There is no rebound and no guarding.   Musculoskeletal: Normal range of motion. She exhibits no edema, tenderness or deformity.   Lymphadenopathy:     She has no axillary adenopathy.        Right axillary: No pectoral adenopathy present.        Left axillary: No pectoral adenopathy present.  Neurological: She is alert and oriented to person, place, and time.   Skin: Skin is warm and dry. She is not diaphoretic.   Psychiatric: Her behavior is normal.   Mood is anxious   Nursing note and vitals reviewed.      Procedures         ED Course  ED Course as of Mar 15 2358   Sun Mar 15, 2020   5102 The patient has been having bouts of anxiety while here in the emergency department.  She is unable to tolerate CTA of the chest secondary to contrast allergy which leads to anaphylaxis per the patient.  I ordered a VQ scan but she initially declined undergoing VQ scanning.  I explained the procedure.  I have ordered hydroxyzine after discussing this with her.  She and her  are in agreement.    [MS]   7124 The patient continues to have anxiety and now is refusing  ventilation/perfusion scan.  Her d-dimer is only mildly elevated.  She reports having positive D-dimers in the past that were felt to be non-serious.  She promises to follow-up closely with her primary care provider.  She understands the risks of not further exploring the d-dimer with a ventilation/perfusion scan.  She is nervous about taking hydroxyzine 100 mg.  She will take 50 mg.  I will write her a prescription for more.    [MS]      ED Course User Index  [MS] Jai Oh MD     Recent Results (from the past 24 hour(s))   Troponin    Collection Time: 03/15/20  7:57 PM   Result Value Ref Range    Troponin T <0.010 0.000 - 0.030 ng/mL   Comprehensive Metabolic Panel    Collection Time: 03/15/20  7:57 PM   Result Value Ref Range    Glucose 183 (H) 65 - 99 mg/dL    BUN 10 6 - 20 mg/dL    Creatinine 0.74 0.57 - 1.00 mg/dL    Sodium 137 136 - 145 mmol/L    Potassium 3.5 3.5 - 5.2 mmol/L    Chloride 100 98 - 107 mmol/L    CO2 24.0 22.0 - 29.0 mmol/L    Calcium 9.6 8.6 - 10.5 mg/dL    Total Protein 7.9 6.0 - 8.5 g/dL    Albumin 4.80 3.50 - 5.20 g/dL    ALT (SGPT) 16 1 - 33 U/L    AST (SGOT) 20 1 - 32 U/L    Alkaline Phosphatase 112 39 - 117 U/L    Total Bilirubin 0.6 0.2 - 1.2 mg/dL    eGFR Non African Amer 82 >60 mL/min/1.73    Globulin 3.1 gm/dL    A/G Ratio 1.5 g/dL    BUN/Creatinine Ratio 13.5 7.0 - 25.0    Anion Gap 13.0 5.0 - 15.0 mmol/L   Lipase    Collection Time: 03/15/20  7:57 PM   Result Value Ref Range    Lipase 38 13 - 60 U/L   BNP    Collection Time: 03/15/20  7:57 PM   Result Value Ref Range    proBNP 48.0 5.0 - 900.0 pg/mL   Light Blue Top    Collection Time: 03/15/20  7:57 PM   Result Value Ref Range    Extra Tube hold for add-on    Green Top (Gel)    Collection Time: 03/15/20  7:57 PM   Result Value Ref Range    Extra Tube Hold for add-ons.    Lavender Top    Collection Time: 03/15/20  7:57 PM   Result Value Ref Range    Extra Tube hold for add-on    Gold Top - SST    Collection Time: 03/15/20   "7:57 PM   Result Value Ref Range    Extra Tube Hold for add-ons.    CBC Auto Differential    Collection Time: 03/15/20  7:57 PM   Result Value Ref Range    WBC 11.88 (H) 3.40 - 10.80 10*3/mm3    RBC 4.55 3.77 - 5.28 10*6/mm3    Hemoglobin 15.5 12.0 - 15.9 g/dL    Hematocrit 45.1 34.0 - 46.6 %    MCV 99.1 (H) 79.0 - 97.0 fL    MCH 34.1 (H) 26.6 - 33.0 pg    MCHC 34.4 31.5 - 35.7 g/dL    RDW 12.6 12.3 - 15.4 %    RDW-SD 46.4 37.0 - 54.0 fl    MPV 9.2 6.0 - 12.0 fL    Platelets 240 140 - 450 10*3/mm3    Neutrophil % 66.2 42.7 - 76.0 %    Lymphocyte % 26.9 19.6 - 45.3 %    Monocyte % 5.6 5.0 - 12.0 %    Eosinophil % 0.7 0.3 - 6.2 %    Basophil % 0.3 0.0 - 1.5 %    Immature Grans % 0.3 0.0 - 0.5 %    Neutrophils, Absolute 7.88 (H) 1.70 - 7.00 10*3/mm3    Lymphocytes, Absolute 3.19 (H) 0.70 - 3.10 10*3/mm3    Monocytes, Absolute 0.66 0.10 - 0.90 10*3/mm3    Eosinophils, Absolute 0.08 0.00 - 0.40 10*3/mm3    Basophils, Absolute 0.04 0.00 - 0.20 10*3/mm3    Immature Grans, Absolute 0.03 0.00 - 0.05 10*3/mm3    nRBC 0.0 0.0 - 0.2 /100 WBC   D-dimer, Quantitative    Collection Time: 03/15/20  7:57 PM   Result Value Ref Range    D-Dimer, Quantitative 0.60 (H) 0.00 - 0.56 MCGFEU/mL     Note: In addition to lab results from this visit, the labs listed above may include labs taken at another facility or during a different encounter within the last 24 hours. Please correlate lab times with ED admission and discharge times for further clarification of the services performed during this visit.    XR Chest 1 View    (Results Pending)   CT Angiogram Chest    (Results Pending)   NM Lung Ventilation Perfusion    (Results Pending)     Vitals:    03/15/20 1944   BP: 129/93   BP Location: Left arm   Patient Position: Sitting   Pulse: 115   Resp: 16   Temp: 97.5 °F (36.4 °C)   TempSrc: Oral   SpO2: 99%   Weight: 70.3 kg (155 lb)   Height: 162.6 cm (64\")     Medications   sodium chloride 0.9 % flush 10 mL (has no administration in time " range)   aspirin chewable tablet 324 mg (has no administration in time range)   hydrOXYzine (ATARAX) tablet 100 mg (100 mg Oral Given 3/15/20 2310)     ECG/EMG Results (last 24 hours)     Procedure Component Value Units Date/Time    ECG 12 Lead [[id number]] Collected:  03/15/20 1952     Updated:  03/15/20 2127    Narrative:       Test Reason : chest pain  Blood Pressure : **/** mmHG  Vent. Rate : 118 BPM     Atrial Rate : 118 BPM     P-R Int : 118 ms          QRS Dur : 078 ms      QT Int : 320 ms       P-R-T Axes : 081 035 072 degrees     QTc Int : 448 ms    Sinus tachycardia  Possible Left atrial enlargement  Nonspecific ST abnormality  Abnormal ECG  Confirmed by NIGEL ABURTO MD ([id number]) on 3/15/2020 9:27:35 PM    Referred By:  SHINE           Confirmed By:NIGEL ABURTO MD    ECG 12 Lead [[id number]] Collected:  03/15/20 2224     Updated:  03/15/20 2224        ECG 12 Lead         ECG 12 Lead   Final Result   Test Reason : chest pain   Blood Pressure : **/** mmHG   Vent. Rate : 118 BPM     Atrial Rate : 118 BPM      P-R Int : 118 ms          QRS Dur : 078 ms       QT Int : 320 ms       P-R-T Axes : 081 035 072 degrees      QTc Int : 448 ms      Sinus tachycardia   Possible Left atrial enlargement   Nonspecific ST abnormality   Abnormal ECG   Confirmed by NIGEL ABURTO MD ([id number]) on 3/15/2020 9:27:35 PM      Referred By:  EDMD           Confirmed By:NIGEL ABURTO MD                                                   [facility]    Final diagnoses:   Nonspecific chest pain   Anxiety       Documentation assistance provided by kely Glass.  Information recorded by the scribrosa was done at my direction and has been verified and validated by me.     Lena Glass  03/15/20 2315       Nigel Aburto MD  03/19/20 2035

## 2020-03-16 NOTE — DISCHARGE INSTRUCTIONS
Avoid stimulants.    Restart use of your CPAP to ensure better sleep.    Try stress reduction techniques.    Follow-up with your primary care provider next available.    Follow-up with the [facility] as I have prescribed.    Return to the emergency department immediately if you have any concerns of worsening condition.

## 2020-03-17 ENCOUNTER — OFFICE VISIT (OUTPATIENT)
Dept: RETAIL CLINIC | Facility: CLINIC | Age: 55
End: 2020-03-17

## 2020-03-17 VITALS
DIASTOLIC BLOOD PRESSURE: 78 MMHG | OXYGEN SATURATION: 96 % | RESPIRATION RATE: 20 BRPM | HEART RATE: 89 BPM | BODY MASS INDEX: 26.61 KG/M2 | TEMPERATURE: 98.7 F | WEIGHT: 155 LBS | SYSTOLIC BLOOD PRESSURE: 130 MMHG

## 2020-03-17 DIAGNOSIS — J02.9 ACUTE PHARYNGITIS, UNSPECIFIED ETIOLOGY: Primary | ICD-10-CM

## 2020-03-17 PROCEDURE — 99213 OFFICE O/P EST LOW 20 MIN: CPT | Performed by: NURSE PRACTITIONER

## 2020-03-17 RX ORDER — CEPHALEXIN 500 MG/1
500 CAPSULE ORAL 2 TIMES DAILY
Qty: 20 CAPSULE | Refills: 0 | Status: SHIPPED | OUTPATIENT
Start: 2020-03-17 | End: 2020-03-27

## 2020-03-17 NOTE — PROGRESS NOTES
"Sore Throat      Subjective   Lisa Hernandez is a 54 y.o. female.     Sore Throat    This is a new problem. Episode onset: 4 days ago. The problem has been gradually worsening. The pain is worse on the left side. There has been no fever. The pain is moderate. Associated symptoms include ear pain (left), swollen glands and trouble swallowing (painful). Pertinent negatives include no abdominal pain, congestion, coughing, diarrhea, drooling, ear discharge, headaches, hoarse voice, plugged ear sensation, neck pain, shortness of breath or vomiting. She has had no exposure to strep or mono. She has tried acetaminophen and gargles for the symptoms. The treatment provided mild relief.        Patient Active Problem List   Diagnosis   • Obstructive sleep apnea, adult   • Psychophysiological insomnia   • Atypical angina (CMS/HCC)   • Dizziness   • Somatization disorder   • Other fatigue       Allergies   Allergen Reactions   • Iodinated Diagnostic Agents Anaphylaxis   • Zithromax [Azithromycin] Other (See Comments)     \"Had to go to ER with Chest Pain\"   • Amoxicillin Rash and Other (See Comments)     Rash and yeast infection   • Hydrocodone-Acetaminophen Rash   • Tetanus Toxoids Rash and GI Intolerance        Current Outpatient Medications on File Prior to Visit   Medication Sig Dispense Refill   • hydrOXYzine pamoate (VISTARIL) 50 MG capsule Take 1 capsule by mouth 3 (Three) Times a Day As Needed for Anxiety. 25 capsule 0   • [DISCONTINUED] aspirin 81 MG chewable tablet Chew 81 mg Daily As Needed for Mild Pain .     • [DISCONTINUED] Cyanocobalamin (B-12) 1000 MCG/ML kit Inject  as directed.       No current facility-administered medications on file prior to visit.        Past Medical History:   Diagnosis Date   • Acid reflux    • Colon polyps    • Depression    • Gall stones    • Ovarian cyst    • Pancreatitis    • Ulcer    • UTI (urinary tract infection)        Past Surgical History:   Procedure Laterality Date   • " CHOLECYSTECTOMY  2007   • HYSTERECTOMY  2010   • TUBAL ABDOMINAL LIGATION  1994       Family History   Problem Relation Age of Onset   • Migraines Mother    • Heart attack Father    • Diabetes Paternal Aunt    • Diabetes Paternal Uncle        Social History     Socioeconomic History   • Marital status:      Spouse name: Not on file   • Number of children: Not on file   • Years of education: Not on file   • Highest education level: Not on file   Tobacco Use   • Smoking status: Current Every Day Smoker   • Smokeless tobacco: Never Used   Substance and Sexual Activity   • Alcohol use: No   • Drug use: Yes     Types: Hydromorphone   • Sexual activity: Defer       Travel:  No recent travel within the last 21 days outside the [place] Denies recent travel to one of the following [place]ries:  [place], [place], [place], or [place].  Denies contact with anyone who has traveled to one of the following [place]  Countries: [place], [place], [place], or [place] within the last 21 days and is known or suspected to have Ebola.  Denies having had any contact with the human remains, blood or any bodily fluids of someone who is known or suspected to have Ebola within the last 21 days.     OB History    None         Review of Systems   Constitutional: Positive for appetite change (decreased). Negative for activity change, fatigue and fever.   HENT: Positive for ear pain (left), sore throat and trouble swallowing (painful). Negative for congestion, drooling, ear discharge, hoarse voice, postnasal drip, rhinorrhea, sinus pressure, sinus pain, sneezing, tinnitus and voice change.    Respiratory: Negative for cough, chest tightness, shortness of breath and wheezing.    Cardiovascular: Negative for chest pain.   Gastrointestinal: Negative for abdominal pain, diarrhea, nausea and vomiting.   Musculoskeletal: Negative for myalgias and neck pain.   Skin: Negative for rash.   Neurological: Negative for headaches.   All  other systems reviewed and are negative.      LMP  (LMP Unknown)     Objective   Physical Exam   Constitutional: She is oriented to person, place, and time. She appears well-developed and well-nourished. No distress.   HENT:   Head: Normocephalic and atraumatic.   Right Ear: Hearing, external ear and ear canal normal. Tympanic membrane is injected.   Left Ear: Hearing, external ear and ear canal normal. Tympanic membrane is injected.   Nose: Nose normal. No mucosal edema or rhinorrhea. Right sinus exhibits no maxillary sinus tenderness and no frontal sinus tenderness. Left sinus exhibits no maxillary sinus tenderness and no frontal sinus tenderness.   Mouth/Throat: Uvula is midline and mucous membranes are normal. Posterior oropharyngeal erythema present. No oropharyngeal exudate, posterior oropharyngeal edema or tonsillar abscesses. Tonsils are 1+ on the right. Tonsils are 1+ on the left. No tonsillar exudate.   Eyes: Pupils are equal, round, and reactive to light. Conjunctivae and EOM are normal. Right eye exhibits no discharge. Left eye exhibits no discharge. No scleral icterus.   Neck: Normal range of motion. Neck supple.   Cardiovascular: Normal rate, regular rhythm and normal heart sounds.   Musculoskeletal: Normal range of motion.   Lymphadenopathy:     She has cervical adenopathy (left anterior).   Neurological: She is alert and oriented to person, place, and time.   Skin: Skin is warm and dry. No rash noted. She is not diaphoretic.   Psychiatric: She has a normal mood and affect. Her behavior is normal.   Vitals reviewed.      Assessment/Plan   Lisa was seen today for sore throat.    Diagnoses and all orders for this visit:    Acute pharyngitis, unspecified etiology  -     cephalexin (KEFLEX) 500 MG capsule; Take 1 capsule by mouth 2 (Two) Times a Day for 10 days.      Plan of care discussed with parent: rest, increase fluids, alternate Tylenol and Motrin for fever/pain; warm salt water gargles;  instructed to hold abx for 24-48hr and start if no improvement and complete antibiotic entirely; change toothbrush 48 hours after starting abx. Follow up with PCP for no improvement in 2-3 days or sooner for new or worsening symptoms.       No follow-ups on file.

## 2020-03-17 NOTE — PATIENT INSTRUCTIONS
Pharyngitis    Pharyngitis is a sore throat (pharynx). This is when there is redness, pain, and swelling in your throat. Most of the time, this condition gets better on its own. In some cases, you may need medicine.  Follow these instructions at home:  · Take over-the-counter and prescription medicines only as told by your doctor.  ? If you were prescribed an antibiotic medicine, take it as told by your doctor. Do not stop taking the antibiotic even if you start to feel better.  ? Do not give children aspirin. Aspirin has been linked to Reye syndrome.  · Drink enough water and fluids to keep your pee (urine) clear or pale yellow.  · Get a lot of rest.  · Rinse your mouth (gargle) with a salt-water mixture 3-4 times a day or as needed. To make a salt-water mixture, completely dissolve ½-1 tsp of salt in 1 cup of warm water.  · If your doctor approves, you may use throat lozenges or sprays to soothe your throat.  Contact a doctor if:  · You have large, tender lumps in your neck.  · You have a rash.  · You cough up green, yellow-brown, or bloody spit.  Get help right away if:  · You have a stiff neck.  · You drool or cannot swallow liquids.  · You cannot drink or take medicines without throwing up.  · You have very bad pain that does not go away with medicine.  · You have problems breathing, and it is not from a stuffy nose.  · You have new pain and swelling in your knees, ankles, wrists, or elbows.  Summary  · Pharyngitis is a sore throat (pharynx). This is when there is redness, pain, and swelling in your throat.  · If you were prescribed an antibiotic medicine, take it as told by your doctor. Do not stop taking the antibiotic even if you start to feel better.  · Most of the time, pharyngitis gets better on its own. Sometimes, you may need medicine.  This information is not intended to replace advice given to you by your health care provider. Make sure you discuss any questions you have with your health care  provider.  Document Released: 06/05/2009 Document Revised: 01/23/2018 Document Reviewed: 01/23/2018  [employer] Interactive Patient Education © 2020 [employer]

## 2020-03-21 ENCOUNTER — APPOINTMENT (OUTPATIENT)
Dept: GENERAL RADIOLOGY | Facility: HOSPITAL | Age: 55
End: 2020-03-21

## 2020-03-21 ENCOUNTER — HOSPITAL ENCOUNTER (EMERGENCY)
Facility: HOSPITAL | Age: 55
Discharge: HOME | End: 2020-03-21
Attending: EMERGENCY MEDICINE | Admitting: EMERGENCY MEDICINE

## 2020-03-21 ENCOUNTER — APPOINTMENT (OUTPATIENT)
Dept: CT IMAGING | Facility: HOSPITAL | Age: 55
End: 2020-03-21

## 2020-03-21 VITALS
BODY MASS INDEX: 26.46 KG/M2 | OXYGEN SATURATION: 97 % | HEART RATE: 70 BPM | TEMPERATURE: 98.2 F | SYSTOLIC BLOOD PRESSURE: 119 MMHG | HEIGHT: 64 IN | DIASTOLIC BLOOD PRESSURE: 82 MMHG | RESPIRATION RATE: 16 BRPM | WEIGHT: 155 LBS

## 2020-03-21 DIAGNOSIS — R07.9 LEFT-SIDED CHEST PAIN: Primary | ICD-10-CM

## 2020-03-21 DIAGNOSIS — Z82.49 FAMILY HISTORY OF CORONARY ARTERY DISEASE: ICD-10-CM

## 2020-03-21 DIAGNOSIS — B34.9 ACUTE VIRAL SYNDROME: ICD-10-CM

## 2020-03-21 DIAGNOSIS — F41.9 ANXIETY: ICD-10-CM

## 2020-03-21 DIAGNOSIS — Z72.0 TOBACCO USE: ICD-10-CM

## 2020-03-21 LAB
ALBUMIN SERPL-MCNC: 5 G/DL (ref 3.5–5.2)
ALBUMIN/GLOB SERPL: 1.5 G/DL
ALP SERPL-CCNC: 110 U/L (ref 39–117)
ALT SERPL W P-5'-P-CCNC: 15 U/L (ref 1–33)
ANION GAP SERPL CALCULATED.3IONS-SCNC: 15 MMOL/L (ref 5–15)
AST SERPL-CCNC: 18 U/L (ref 1–32)
BASOPHILS # BLD AUTO: 0.05 10*3/MM3 (ref 0–0.2)
BASOPHILS NFR BLD AUTO: 0.4 % (ref 0–1.5)
BILIRUB SERPL-MCNC: 0.8 MG/DL (ref 0.2–1.2)
BUN BLD-MCNC: 11 MG/DL (ref 6–20)
BUN/CREAT SERPL: 14.3 (ref 7–25)
CALCIUM SPEC-SCNC: 9.9 MG/DL (ref 8.6–10.5)
CHLORIDE SERPL-SCNC: 100 MMOL/L (ref 98–107)
CO2 SERPL-SCNC: 25 MMOL/L (ref 22–29)
CREAT BLD-MCNC: 0.77 MG/DL (ref 0.57–1)
DEPRECATED RDW RBC AUTO: 46.1 FL (ref 37–54)
EOSINOPHIL # BLD AUTO: 0.06 10*3/MM3 (ref 0–0.4)
EOSINOPHIL NFR BLD AUTO: 0.5 % (ref 0.3–6.2)
ERYTHROCYTE [DISTWIDTH] IN BLOOD BY AUTOMATED COUNT: 12.8 % (ref 12.3–15.4)
GFR SERPL CREATININE-BSD FRML MDRD: 79 ML/MIN/1.73
GLOBULIN UR ELPH-MCNC: 3.4 GM/DL
GLUCOSE BLD-MCNC: 113 MG/DL (ref 65–99)
HCT VFR BLD AUTO: 50 % (ref 34–46.6)
HGB BLD-MCNC: 16.8 G/DL (ref 12–15.9)
HOLD SPECIMEN: NORMAL
HOLD SPECIMEN: NORMAL
IMM GRANULOCYTES # BLD AUTO: 0.03 10*3/MM3 (ref 0–0.05)
IMM GRANULOCYTES NFR BLD AUTO: 0.2 % (ref 0–0.5)
LIPASE SERPL-CCNC: 35 U/L (ref 13–60)
LYMPHOCYTES # BLD AUTO: 2.18 10*3/MM3 (ref 0.7–3.1)
LYMPHOCYTES NFR BLD AUTO: 17.1 % (ref 19.6–45.3)
MCH RBC QN AUTO: 33.1 PG (ref 26.6–33)
MCHC RBC AUTO-ENTMCNC: 33.6 G/DL (ref 31.5–35.7)
MCV RBC AUTO: 98.6 FL (ref 79–97)
MONOCYTES # BLD AUTO: 0.53 10*3/MM3 (ref 0.1–0.9)
MONOCYTES NFR BLD AUTO: 4.1 % (ref 5–12)
NEUTROPHILS # BLD AUTO: 9.93 10*3/MM3 (ref 1.7–7)
NEUTROPHILS NFR BLD AUTO: 77.7 % (ref 42.7–76)
NRBC BLD AUTO-RTO: 0 /100 WBC (ref 0–0.2)
NT-PROBNP SERPL-MCNC: 22 PG/ML (ref 5–900)
PLATELET # BLD AUTO: 258 10*3/MM3 (ref 140–450)
PMV BLD AUTO: 9.3 FL (ref 6–12)
POTASSIUM BLD-SCNC: 3.8 MMOL/L (ref 3.5–5.2)
PROT SERPL-MCNC: 8.4 G/DL (ref 6–8.5)
RBC # BLD AUTO: 5.07 10*6/MM3 (ref 3.77–5.28)
SODIUM BLD-SCNC: 140 MMOL/L (ref 136–145)
TROPONIN T SERPL-MCNC: <0.01 NG/ML (ref 0–0.03)
TROPONIN T SERPL-MCNC: <0.01 NG/ML (ref 0–0.03)
WBC NRBC COR # BLD: 12.78 10*3/MM3 (ref 3.4–10.8)
WHOLE BLOOD HOLD SPECIMEN: NORMAL
WHOLE BLOOD HOLD SPECIMEN: NORMAL

## 2020-03-21 PROCEDURE — 96375 TX/PRO/DX INJ NEW DRUG ADDON: CPT

## 2020-03-21 PROCEDURE — 83690 ASSAY OF LIPASE: CPT | Performed by: EMERGENCY MEDICINE

## 2020-03-21 PROCEDURE — 93005 ELECTROCARDIOGRAM TRACING: CPT | Performed by: EMERGENCY MEDICINE

## 2020-03-21 PROCEDURE — 93005 ELECTROCARDIOGRAM TRACING: CPT

## 2020-03-21 PROCEDURE — 85025 COMPLETE CBC W/AUTO DIFF WBC: CPT | Performed by: EMERGENCY MEDICINE

## 2020-03-21 PROCEDURE — 83880 ASSAY OF NATRIURETIC PEPTIDE: CPT | Performed by: EMERGENCY MEDICINE

## 2020-03-21 PROCEDURE — 71045 X-RAY EXAM CHEST 1 VIEW: CPT

## 2020-03-21 PROCEDURE — 96374 THER/PROPH/DIAG INJ IV PUSH: CPT

## 2020-03-21 PROCEDURE — 80053 COMPREHEN METABOLIC PANEL: CPT | Performed by: EMERGENCY MEDICINE

## 2020-03-21 PROCEDURE — 99285 EMERGENCY DEPT VISIT HI MDM: CPT

## 2020-03-21 PROCEDURE — 84484 ASSAY OF TROPONIN QUANT: CPT | Performed by: EMERGENCY MEDICINE

## 2020-03-21 PROCEDURE — 25010000002 KETOROLAC TROMETHAMINE PER 15 MG: Performed by: EMERGENCY MEDICINE

## 2020-03-21 PROCEDURE — 71250 CT THORAX DX C-: CPT

## 2020-03-21 RX ORDER — ASPIRIN 81 MG/1
324 TABLET, CHEWABLE ORAL ONCE
Status: COMPLETED | OUTPATIENT
Start: 2020-03-21 | End: 2020-03-21

## 2020-03-21 RX ORDER — HYDROXYZINE HYDROCHLORIDE 10 MG/1
10 TABLET, FILM COATED ORAL EVERY 6 HOURS PRN
Qty: 28 TABLET | Refills: 0 | Status: SHIPPED | OUTPATIENT
Start: 2020-03-21 | End: 2020-04-29 | Stop reason: SDUPTHER

## 2020-03-21 RX ORDER — ACETAMINOPHEN 500 MG
1000 TABLET ORAL ONCE
Status: COMPLETED | OUTPATIENT
Start: 2020-03-21 | End: 2020-03-21

## 2020-03-21 RX ORDER — KETOROLAC TROMETHAMINE 15 MG/ML
10 INJECTION, SOLUTION INTRAMUSCULAR; INTRAVENOUS ONCE
Status: COMPLETED | OUTPATIENT
Start: 2020-03-21 | End: 2020-03-21

## 2020-03-21 RX ORDER — SODIUM CHLORIDE 0.9 % (FLUSH) 0.9 %
10 SYRINGE (ML) INJECTION AS NEEDED
Status: DISCONTINUED | OUTPATIENT
Start: 2020-03-21 | End: 2020-03-21 | Stop reason: HOSPADM

## 2020-03-21 RX ORDER — FAMOTIDINE 10 MG/ML
20 INJECTION, SOLUTION INTRAVENOUS ONCE
Status: COMPLETED | OUTPATIENT
Start: 2020-03-21 | End: 2020-03-21

## 2020-03-21 RX ADMIN — ASPIRIN 81 MG 324 MG: 81 TABLET ORAL at 13:14

## 2020-03-21 RX ADMIN — KETOROLAC TROMETHAMINE 10 MG: 15 INJECTION, SOLUTION INTRAMUSCULAR; INTRAVENOUS at 13:53

## 2020-03-21 RX ADMIN — FAMOTIDINE 20 MG: 10 INJECTION, SOLUTION INTRAVENOUS at 13:51

## 2020-03-21 RX ADMIN — ACETAMINOPHEN 1000 MG: 500 TABLET, FILM COATED ORAL at 13:54

## 2020-03-27 ENCOUNTER — OFFICE VISIT (OUTPATIENT)
Dept: CARDIOLOGY | Facility: HOSPITAL | Age: 55
End: 2020-03-27

## 2020-03-27 ENCOUNTER — HOSPITAL ENCOUNTER (OUTPATIENT)
Dept: CARDIOLOGY | Facility: HOSPITAL | Age: 55
Discharge: HOME | End: 2020-03-27

## 2020-03-27 VITALS
SYSTOLIC BLOOD PRESSURE: 106 MMHG | RESPIRATION RATE: 18 BRPM | DIASTOLIC BLOOD PRESSURE: 64 MMHG | HEART RATE: 89 BPM | WEIGHT: 156 LBS | OXYGEN SATURATION: 97 % | BODY MASS INDEX: 26.63 KG/M2 | TEMPERATURE: 98.8 F | HEIGHT: 64 IN

## 2020-03-27 DIAGNOSIS — R00.2 PALPITATIONS: ICD-10-CM

## 2020-03-27 DIAGNOSIS — R53.83 OTHER FATIGUE: ICD-10-CM

## 2020-03-27 DIAGNOSIS — R55 NEAR SYNCOPE: ICD-10-CM

## 2020-03-27 DIAGNOSIS — F41.9 ANXIETY: ICD-10-CM

## 2020-03-27 DIAGNOSIS — I20.8 ATYPICAL ANGINA (HCC): Primary | ICD-10-CM

## 2020-03-27 LAB
CHOLEST SERPL-MCNC: 192 MG/DL (ref 0–200)
HBA1C MFR BLD: 5.6 % (ref 4.8–5.6)
HDLC SERPL-MCNC: 48 MG/DL (ref 40–60)
LDLC SERPL CALC-MCNC: 123 MG/DL (ref 0–100)
LDLC/HDLC SERPL: 2.57 {RATIO}
TRIGL SERPL-MCNC: 103 MG/DL (ref 0–150)
VLDLC SERPL-MCNC: 20.6 MG/DL (ref 5–40)

## 2020-03-27 PROCEDURE — 99214 OFFICE O/P EST MOD 30 MIN: CPT | Performed by: NURSE PRACTITIONER

## 2020-03-27 PROCEDURE — 80061 LIPID PANEL: CPT | Performed by: NURSE PRACTITIONER

## 2020-03-27 PROCEDURE — 83036 HEMOGLOBIN GLYCOSYLATED A1C: CPT | Performed by: NURSE PRACTITIONER

## 2020-03-27 NOTE — PROGRESS NOTES
[facility]  Heart and Valve Center      03/27/2020         [address]isa Hernandez  [address] [facility] [address]  [unfilled]    1965    Wojciech Encarnacion DO    Lisa Hernandez is a 54 y.o. female.      Subjective:     Chief Complaint:  Syncope; Palpitations; and Chest Pain       HPI   54-year-old female with history of atypical chest pain, GERD, depression and anxiety who presents to the chest pain clinic at the request of Dr. Gold.  Patient presented the ED on 3/21 with complaints of left-sided chest pain radiating to her left neck that started the morning of the ED visit.  She reports associated lower extremity weakness and general malaise over the past few weeks.  Patient presented the ED on 3/15 also with chest pain, which she feels was attributed to anxiety.  Work-up during recent ED visit revealed negative troponins and normal EKG.  Other labs and chest x-ray unremarkable.  CT chest w/o contrast showed no acute abnormalities.  Patient evaluated by Dr. Pace with cardiology in January 2019 and had a normal stress test and echo 2/28. Patient had a coronary CTA in 2000 which showed a CCS of 0.     Patient reports that she continues to have sudden episodes where she feels nausea, her heart pounding and then has pain in her neck.  Denies any significant chest pain but reports left-sided neck pain.  During these episodes she feels near syncopal.  Denies anabela syncope.  She reports a history of severe panic attacks that previously were treated with Klonopin which she stopped in 2015.  She has been able to control her anxiety since then without medications.  She reports she does not feel that anxiety precipitates symptoms but is a result of symptoms. She is tearful today out of concern for symptoms and her anxiety. She worries that she may be diabetic like her father. She also worries because father had massive MI at 56 and her paternal aunt and cousins had premature CAD. She notes severe  "fatigue and says she spends most of her day on the couch    Cardiac risks: borderline age, tobacco use, family hx    Patient Active Problem List   Diagnosis   • Obstructive sleep apnea, adult   • Psychophysiological insomnia   • Atypical angina (CMS/HCC)   • Dizziness   • Somatization disorder   • Other fatigue       Past Medical History:   Diagnosis Date   • Acid reflux    • Colon polyps    • Depression    • Gall stones    • Ovarian cyst    • Pancreatitis    • Ulcer    • UTI (urinary tract infection)        Past Surgical History:   Procedure Laterality Date   • CHOLECYSTECTOMY  2007   • HYSTERECTOMY  2010   • TUBAL ABDOMINAL LIGATION  1994       Family History   Problem Relation Age of Onset   • Migraines Mother    • Heart attack Father    • Diabetes Paternal Aunt    • Diabetes Paternal Uncle    • No Known Problems Sister    • Stroke Brother    • No Known Problems Maternal Grandmother    • No Known Problems Maternal Grandfather    • No Known Problems Paternal Grandmother    • No Known Problems Paternal Grandfather    • Diabetes Other        Social History     Socioeconomic History   • Marital status:      Spouse name: Not on file   • Number of children: Not on file   • Years of education: Not on file   • Highest education level: Not on file   Tobacco Use   • Smoking status: Current Every Day Smoker   • Smokeless tobacco: Never Used   Substance and Sexual Activity   • Alcohol use: No   • Drug use: Never   • Sexual activity: Defer   Social History Narrative    Caffeine: 1 Dr. Pepper daily       Allergies   Allergen Reactions   • Iodinated Diagnostic Agents Anaphylaxis   • Zithromax [Azithromycin] Other (See Comments)     \"Had to go to ER with Chest Pain\"   • Amoxicillin Rash and Other (See Comments)     Rash and yeast infection   • Hydrocodone-Acetaminophen Rash   • Tetanus Toxoids Rash and GI Intolerance         Current Outpatient Medications:   •  cephalexin (KEFLEX) 500 MG capsule, Take 1 capsule by mouth 2 " "(Two) Times a Day for 10 days., Disp: 20 capsule, Rfl: 0  •  hydrOXYzine (ATARAX) 10 MG tablet, Take 1 tablet by mouth Every 6 (Six) Hours As Needed for Anxiety., Disp: 28 tablet, Rfl: 0    The following portions of the patient's history were reviewed today and updated as appropriate: allergies, current medications, past family history, past medical history, past social history, past surgical history and problem list     Review of Systems   Constitution: Positive for malaise/fatigue. Negative for chills and fever.   HENT: Negative.    Eyes: Negative.    Cardiovascular: Positive for chest pain, irregular heartbeat, near-syncope and palpitations. Negative for claudication, cyanosis, dyspnea on exertion, leg swelling, orthopnea, paroxysmal nocturnal dyspnea and syncope.   Respiratory: Negative for cough, shortness of breath and snoring.    Endocrine: Negative.    Hematologic/Lymphatic: Does not bruise/bleed easily.   Skin: Negative for poor wound healing.   Musculoskeletal: Negative.    Gastrointestinal: Positive for diarrhea and nausea. Negative for abdominal pain, heartburn, hematemesis, melena and vomiting.   Genitourinary: Negative.  Negative for hematuria.   Neurological: Positive for excessive daytime sleepiness and light-headedness.   Psychiatric/Behavioral: The patient is nervous/anxious.    Allergic/Immunologic: Negative.        Objective:     Vitals:    03/27/20 0828 03/27/20 0829 03/27/20 0830   BP: 107/62 109/63 106/64   BP Location: Right arm Right arm Left arm   Patient Position: Standing Sitting Sitting   Cuff Size: Adult Adult Adult   Pulse: 87 87 89   Resp:   18   Temp: 99.1 °F (37.3 °C) 98.8 °F (37.1 °C) 98.8 °F (37.1 °C)   TempSrc: Temporal Temporal Temporal   SpO2: 98% 98% 97%   Weight:   70.8 kg (156 lb)   Height:   162.6 cm (64\")       Body mass index is 26.78 kg/m².    Physical Exam   Constitutional: She is oriented to person, place, and time. She appears well-developed and well-nourished. No " distress.   HENT:   Head: Normocephalic.   Eyes: Pupils are equal, round, and reactive to light. Conjunctivae are normal.   Neck: Neck supple. No JVD present. No thyromegaly present.   Cardiovascular: Normal rate, regular rhythm, normal heart sounds and intact distal pulses. Exam reveals no gallop and no friction rub.   No murmur heard.  Pulmonary/Chest: Effort normal and breath sounds normal. No respiratory distress. She has no wheezes. She has no rales. She exhibits no tenderness.   Abdominal: Soft. Bowel sounds are normal.   Musculoskeletal: Normal range of motion. She exhibits no edema.   Neurological: She is alert and oriented to person, place, and time.   Skin: Skin is warm and dry.   Psychiatric: She has a normal mood and affect. Her behavior is normal. Thought content normal.   Vitals reviewed.      Lab and Diagnostic Review:  Echo 2/28/20  · Left ventricular systolic function is normal. Estimated EF = 60%.  · The cardiac valves are anatomically and functionally normal.  · Estimated right ventricular systolic pressure from tricuspid regurgitation is normal (<35 mmHg).   Stress test 2/28/20  · Normal exercise nuclear stress test by clinical, EKG, and nuclear perfusion criteria  · Good exercise capacity. Expected exercise time 7:50, actual time 10:30  · Myocardial perfusion imaging indicates a normal myocardial perfusion study with no evidence of ischemia.  · Left ventricular ejection fraction is hyperdynamic (Calculated EF > 70%).  · No calcification of the coronary arteries noted on CT attenuation correction images.  · Impressions are consistent with a low risk study.  · Low risk for ischemic heart disease.    Results for orders placed or performed during the hospital encounter of 03/21/20   Troponin   Result Value Ref Range    Troponin T <0.010 0.000 - 0.030 ng/mL   Comprehensive Metabolic Panel   Result Value Ref Range    Glucose 113 (H) 65 - 99 mg/dL    BUN 11 6 - 20 mg/dL    Creatinine 0.77 0.57 - 1.00  mg/dL    Sodium 140 136 - 145 mmol/L    Potassium 3.8 3.5 - 5.2 mmol/L    Chloride 100 98 - 107 mmol/L    CO2 25.0 22.0 - 29.0 mmol/L    Calcium 9.9 8.6 - 10.5 mg/dL    Total Protein 8.4 6.0 - 8.5 g/dL    Albumin 5.00 3.50 - 5.20 g/dL    ALT (SGPT) 15 1 - 33 U/L    AST (SGOT) 18 1 - 32 U/L    Alkaline Phosphatase 110 39 - 117 U/L    Total Bilirubin 0.8 0.2 - 1.2 mg/dL    eGFR Non African Amer 79 >60 mL/min/1.73    Globulin 3.4 gm/dL    A/G Ratio 1.5 g/dL    BUN/Creatinine Ratio 14.3 7.0 - 25.0    Anion Gap 15.0 5.0 - 15.0 mmol/L   Lipase   Result Value Ref Range    Lipase 35 13 - 60 U/L   BNP   Result Value Ref Range    proBNP 22.0 5.0 - 900.0 pg/mL   CBC Auto Differential   Result Value Ref Range    WBC 12.78 (H) 3.40 - 10.80 10*3/mm3    RBC 5.07 3.77 - 5.28 10*6/mm3    Hemoglobin 16.8 (H) 12.0 - 15.9 g/dL    Hematocrit 50.0 (H) 34.0 - 46.6 %    MCV 98.6 (H) 79.0 - 97.0 fL    MCH 33.1 (H) 26.6 - 33.0 pg    MCHC 33.6 31.5 - 35.7 g/dL    RDW 12.8 12.3 - 15.4 %    RDW-SD 46.1 37.0 - 54.0 fl    MPV 9.3 6.0 - 12.0 fL    Platelets 258 140 - 450 10*3/mm3    Neutrophil % 77.7 (H) 42.7 - 76.0 %    Lymphocyte % 17.1 (L) 19.6 - 45.3 %    Monocyte % 4.1 (L) 5.0 - 12.0 %    Eosinophil % 0.5 0.3 - 6.2 %    Basophil % 0.4 0.0 - 1.5 %    Immature Grans % 0.2 0.0 - 0.5 %    Neutrophils, Absolute 9.93 (H) 1.70 - 7.00 10*3/mm3    Lymphocytes, Absolute 2.18 0.70 - 3.10 10*3/mm3    Monocytes, Absolute 0.53 0.10 - 0.90 10*3/mm3    Eosinophils, Absolute 0.06 0.00 - 0.40 10*3/mm3    Basophils, Absolute 0.05 0.00 - 0.20 10*3/mm3    Immature Grans, Absolute 0.03 0.00 - 0.05 10*3/mm3    nRBC 0.0 0.0 - 0.2 /100 WBC   Troponin   Result Value Ref Range    Troponin T <0.010 0.000 - 0.030 ng/mL   CT Chest  IMPRESSION:  No acute intrathoracic process with only minimal  subsegmental atelectasis. No focal consolidation or overt edema and no  effusion.    CXR  Mild if any prominence of the perihilar lung markings may  represent bronchitis and/or  reactive airways disease without focal  consolidation separate or effusion.    EKG 3/21/20 NSR    Assessment and Plan:   1. Atypical angina (CMS/HCC)  Patient had normal stress test and echo a year ago. Current symptoms sound more like an arrhythmia vs panic attack. She does not need ischemic evaluation at this time but may reconsider after COVID19 epidemic and procedure restrictions lifted if she continues to have symptoms and if arrhythmia evaluation negative  Tobacco cessation encouraged, especially in light of her family history   - Lipid Panel    2. Near syncope  - Mobile Cardiac Outpatient Telemetry; Future    3. Palpitations  - Mobile Cardiac Outpatient Telemetry; Future    4. Other fatigue  - Hemoglobin A1c; Future      5. Anxiety  Recently started on hydroxyzine but she was scared to start because she was fatigue on 50mg and googled possible side effects. Encouraged to try as anxiety could be associated with symptoms    RV in 8 weeks          It has been a pleasure to participate in the care of this patient.  Patient was instructed to call the [facility] and Valve Center with any questions, concerns, or worsening symptoms.    *Please note that portions of this note were completed with a voice recognition program. Efforts were made to edit the dictations, but occasionally words are mistranscribed.

## 2020-04-28 NOTE — PROGRESS NOTES
[facility]  Heart and Valve Center  Telemedicine note    04/29/2020         Lisa Hernandez  [address] [facility] [address] [address]  [unfilled]    1965    Wojciech Encarnacion,     Lisa Hernandez is a 54 y.o. female.      Subjective:     Chief Complaint:  Follow-up and Palpitations       This was an audio and video enabled telemedicine encounter.    HPI   54-year-old female with history of atypical chest pain, GERD, depression and anxiety scheduled for a telemedicine follow-up on palpitations. Patient wore event monitor for about 21 days which was negative for arrhythmias. Triggered events were NSR, average HR was 72.  She still has intermittent episodes of feeling her heart race and feeling near syncope and feelings of exhaustion. Overall feels better. Still has occasional chest pain randomly, which sometimes occurs when she feels near syncopal and sometimes with anxiety. Not with physical exertion. Hydroxyzine does seem to help. Currently smokes 0.5ppd and is working to cut back    Patient evaluated by Dr. Pace with cardiology in January 2019 and had a normal stress test and echo 2/28. Patient had a coronary CTA in 2000 which showed a CCS of 0.     Cardiac risks: borderline age, tobacco use, family hx    Patient Active Problem List   Diagnosis   • Obstructive sleep apnea, adult   • Psychophysiological insomnia   • Atypical angina (CMS/HCC)   • Dizziness   • Somatization disorder   • Other fatigue       Past Medical History:   Diagnosis Date   • Acid reflux    • Colon polyps    • Depression    • Gall stones    • Ovarian cyst    • Pancreatitis    • Ulcer    • UTI (urinary tract infection)        Past Surgical History:   Procedure Laterality Date   • CHOLECYSTECTOMY  2007   • HYSTERECTOMY  2010   • TUBAL ABDOMINAL LIGATION  1994       Family History   Problem Relation Age of Onset   • Migraines Mother    • Heart attack Father    • Diabetes Paternal Aunt    • Diabetes Paternal Uncle    • No  "Known Problems Sister    • Stroke Brother    • No Known Problems Maternal Grandmother    • No Known Problems Maternal Grandfather    • No Known Problems Paternal Grandmother    • No Known Problems Paternal Grandfather    • Diabetes Other        Social History     Socioeconomic History   • Marital status:      Spouse name: Not on file   • Number of children: Not on file   • Years of education: Not on file   • Highest education level: Not on file   Tobacco Use   • Smoking status: Current Every Day Smoker   • Smokeless tobacco: Never Used   Substance and Sexual Activity   • Alcohol use: No   • Drug use: Never   • Sexual activity: Defer   Social History Narrative    Caffeine: 1 Dr. Pepper daily       Allergies   Allergen Reactions   • Iodinated Diagnostic Agents Anaphylaxis   • Zithromax [Azithromycin] Other (See Comments)     \"Had to go to ER with Chest Pain\"   • Amoxicillin Rash and Other (See Comments)     Rash and yeast infection   • Hydrocodone-Acetaminophen Rash   • Tetanus Toxoids Rash and GI Intolerance         Current Outpatient Medications:   •  hydrOXYzine (ATARAX) 10 MG tablet, Take 1 tablet by mouth Every 6 (Six) Hours As Needed for Anxiety., Disp: 90 tablet, Rfl: 0    The following portions of the patient's history were reviewed today and updated as appropriate: allergies, current medications, past family history, past medical history, past social history, past surgical history and problem list     Review of Systems   Constitution: Positive for malaise/fatigue. Negative for chills and fever.   HENT: Negative.    Eyes: Negative.    Cardiovascular: Positive for chest pain, irregular heartbeat, near-syncope and palpitations. Negative for claudication, cyanosis, dyspnea on exertion, leg swelling, orthopnea, paroxysmal nocturnal dyspnea and syncope.   Respiratory: Negative for cough, shortness of breath and snoring.    Endocrine: Negative.    Hematologic/Lymphatic: Does not bruise/bleed easily.   Skin: " "Negative for poor wound healing.   Musculoskeletal: Negative.    Gastrointestinal: Positive for diarrhea and nausea. Negative for abdominal pain, heartburn, hematemesis, melena and vomiting.   Genitourinary: Negative.  Negative for hematuria.   Neurological: Positive for excessive daytime sleepiness and light-headedness.   Psychiatric/Behavioral: The patient is nervous/anxious.    Allergic/Immunologic: Negative.        Objective:     Vitals:    04/29/20 1408   BP: 110/70   Pulse: 92   Weight: 68 kg (150 lb)   Height: 162.6 cm (64\")       Body mass index is 25.75 kg/m².    Physical Exam   Constitutional: She is oriented to person, place, and time.   Pulmonary/Chest: No respiratory distress.   Neurological: She is alert and oriented to person, place, and time.   Psychiatric: She has a normal mood and affect. Her behavior is normal. Judgment and thought content normal.       Lab and Diagnostic Review:  Echo 2/28/19  · Left ventricular systolic function is normal. Estimated EF = 60%.  · The cardiac valves are anatomically and functionally normal.  · Estimated right ventricular systolic pressure from tricuspid regurgitation is normal (<35 mmHg).   Stress test 2/28/19  · Normal exercise nuclear stress test by clinical, EKG, and nuclear perfusion criteria  · Good exercise capacity. Expected exercise time 7:50, actual time 10:30  · Myocardial perfusion imaging indicates a normal myocardial perfusion study with no evidence of ischemia.  · Left ventricular ejection fraction is hyperdynamic (Calculated EF > 70%).  · No calcification of the coronary arteries noted on CT attenuation correction images.  · Impressions are consistent with a low risk study.  · Low risk for ischemic heart disease.    Event monitor x 21 days avg HR 72, min HR 49, max     Results for orders placed or performed in visit on 03/27/20   Lipid Panel   Result Value Ref Range    Total Cholesterol 192 0 - 200 mg/dL    Triglycerides 103 0 - 150 mg/dL    " HDL Cholesterol 48 40 - 60 mg/dL    LDL Cholesterol  123 (H) 0 - 100 mg/dL    VLDL Cholesterol 20.6 5 - 40 mg/dL    LDL/HDL Ratio 2.57    Hemoglobin A1c   Result Value Ref Range    Hemoglobin A1C 5.60 4.80 - 5.60 %         Assessment and Plan:   1. Near syncope  No arrhythmias on heart monitor. BPs are normal when she checks during episodes. Episodes likely triggered by anxiety    2. Palpitations  No arrhythmias on event monitor. Likely anxiety induced    3. Atypical angina (CMS/HCC)  No exertional chest pain, associated with anxiety. She does of ASCVD risks, but overall ASCVD risk score is 3.1%. I explained this to her and told her that her greatest risk is her ongoing tobacco abuse. I did offer her a repeat CCS in the future after COVID19 epidemic improves. She has anaphylaxis with contrast, so would not recommend CTA coronaries    4. Anxiety  Refilled hydroxyzine until she can get in with her PCP. Advised follow up with PCP    5. Tobacco abuse  Discussed risks of ongoing tobacco abuse including MI/plaque rupture despite normal cardiac testing and advised to continue to work on cutting back.     RV PRN            This visit has been rescheduled as a video visit to comply with patient safety concerns in accordance with CDC recommendations. Total time of discussion was 25 minutes.      It has been a pleasure to participate in the care of this patient.  Patient was instructed to call the [facility] Center with any questions, concerns, or worsening symptoms.    *Please note that portions of this note were completed with a voice recognition program. Efforts were made to edit the dictations, but occasionally words are mistranscribed.

## 2020-04-29 ENCOUNTER — TELEMEDICINE (OUTPATIENT)
Dept: CARDIOLOGY | Facility: HOSPITAL | Age: 55
End: 2020-04-29

## 2020-04-29 VITALS
WEIGHT: 150 LBS | HEIGHT: 64 IN | BODY MASS INDEX: 25.61 KG/M2 | DIASTOLIC BLOOD PRESSURE: 70 MMHG | SYSTOLIC BLOOD PRESSURE: 110 MMHG | HEART RATE: 92 BPM

## 2020-04-29 DIAGNOSIS — Z72.0 TOBACCO ABUSE: ICD-10-CM

## 2020-04-29 DIAGNOSIS — R00.2 PALPITATIONS: ICD-10-CM

## 2020-04-29 DIAGNOSIS — I20.8 ATYPICAL ANGINA (HCC): ICD-10-CM

## 2020-04-29 DIAGNOSIS — F41.9 ANXIETY: ICD-10-CM

## 2020-04-29 DIAGNOSIS — R55 NEAR SYNCOPE: Primary | ICD-10-CM

## 2020-04-29 PROCEDURE — 99214 OFFICE O/P EST MOD 30 MIN: CPT | Performed by: NURSE PRACTITIONER

## 2020-04-29 RX ORDER — HYDROXYZINE HYDROCHLORIDE 10 MG/1
10 TABLET, FILM COATED ORAL EVERY 6 HOURS PRN
Qty: 90 TABLET | Refills: 0 | Status: SHIPPED | OUTPATIENT
Start: 2020-04-29 | End: 2020-04-29 | Stop reason: SDUPTHER

## 2020-04-29 RX ORDER — HYDROXYZINE HYDROCHLORIDE 10 MG/1
10 TABLET, FILM COATED ORAL EVERY 6 HOURS PRN
Qty: 90 TABLET | Refills: 0 | OUTPATIENT
Start: 2020-04-29 | End: 2021-10-13

## 2020-05-01 PROCEDURE — 93272 ECG/REVIEW INTERPRET ONLY: CPT | Performed by: INTERNAL MEDICINE

## 2020-09-09 PROCEDURE — 87086 URINE CULTURE/COLONY COUNT: CPT | Performed by: NURSE PRACTITIONER

## 2020-09-09 PROCEDURE — 87088 URINE BACTERIA CULTURE: CPT | Performed by: NURSE PRACTITIONER

## 2020-09-09 PROCEDURE — 87186 SC STD MICRODIL/AGAR DIL: CPT | Performed by: NURSE PRACTITIONER

## 2020-09-11 ENCOUNTER — TELEPHONE (OUTPATIENT)
Dept: URGENT CARE | Facility: CLINIC | Age: 55
End: 2020-09-11

## 2021-10-21 NOTE — PROGRESS NOTES
"[facility]  Heart and Valve Center    Chief Complaint  Chest Pain and Follow-up    Subjective    History of Present Illness {CC  Problem List  Visit  Diagnosis   Encounters  Notes  Medications  Labs  Result Review Imaging  Media :23}     Lisa Limon is a 56 y.o. female with history of atypical chest pain, GERD, depression and anxiety who presents today to follow up on chest pain. She reports that her CPAP was recalled and her sleep apnea has been untreated recently and has noticed worsening fatigue and weakness over the past month. She also reports increasing episodes of lightheadedness and near syncope when she ambulates. She has associated leg weakness and pain. No syncope. She notes intermittent squeezing of her left chest and occasionally some sharp pains in her left neck.Occurs mostly at rest. She does note associated anxiety. She reports that she went to [facility] ED with shakiness and chest pain 10/23. Work up reportedly normal and she was given ativan. She has not started this yet because of fear of side effects. She was also given azithromycin and medrol dose pack for sinusitis but did not start this either    Patient evaluated by Dr. Pace with cardiology in January 2019 and had a normal nuclear stress test and echo 2/28/19. Had normal event monitor 3/27/20.     She continues to have daily palpitations, similar to what she felt during event monitor. Triggered events on monitor were NSR     Cardiac risks: Age, tobacco abuse, family hx    Objective     Vital Signs:   Vitals:    10/25/21 1402   BP: 130/77   BP Location: Left arm   Patient Position: Sitting   Cuff Size: Adult   Pulse: 88   Resp: 20   Temp: 98.4 °F (36.9 °C)   TempSrc: Temporal   SpO2: 95%   Weight: 71.8 kg (158 lb 4 oz)   Height: 162.6 cm (64\")     Body mass index is 27.16 kg/m².  Physical Exam  Vitals reviewed.   Constitutional:       Appearance: Normal appearance.   HENT:      Head: Normocephalic. "   Eyes:      Extraocular Movements: Extraocular movements intact.      Pupils: Pupils are equal, round, and reactive to light.   Neck:      Vascular: No carotid bruit.   Cardiovascular:      Rate and Rhythm: Normal rate and regular rhythm.      Pulses: Normal pulses.      Heart sounds: Normal heart sounds, S1 normal and S2 normal. No murmur heard.      Pulmonary:      Effort: Pulmonary effort is normal. No respiratory distress.      Breath sounds: Normal breath sounds.   Chest:      Chest wall: No tenderness.   Abdominal:      General: Abdomen is flat. Bowel sounds are normal.      Palpations: Abdomen is soft.   Musculoskeletal:      Cervical back: Neck supple.      Right lower leg: No edema.      Left lower leg: No edema.   Skin:     General: Skin is warm and dry.   Neurological:      General: No focal deficit present.      Mental Status: She is alert and oriented to person, place, and time. Mental status is at baseline.   Psychiatric:         Mood and Affect: Mood normal.         Behavior: Behavior normal.         Thought Content: Thought content normal.              Result Review  Data Reviewed:{ Labs  Result Review  Imaging  Med Tab  Media :23}   Mobile Cardiac Outpatient Telemetry (03/31/2020 09:35)  Adult Transthoracic Echo Complete W/ Cont if Necessary Per Protocol (02/28/2019 09:56)  Stress Test With Myocardial Perfusion One Day (02/28/2019 08:25)  Reviewed records from [facility]             Assessment and Plan {CC Problem List  Visit Diagnosis  ROS  Review (Popup)  Health Maintenance  Quality  BestPractice  Medications  SmartSets  SnapShot Encounters  Media :23}   1. Chest pain, unspecified type  Chest pain seems atypical. We discussed repeating stress test versus coronary calcium score (history of anaphylaxis with contrast and therefore CTA was deferred).  At the time due to atypical symptoms, we both agree that a coronary calcium score would give us better risk stratification  - ECG 12  Lead; Future  - CT Cardiac Calcium Score Without Dye; Future    2. Lightheadedness  - Duplex Carotid Ultrasound CAR; Future    3. Pain in both lower extremities  - Doppler Arterial Lower Extremity Stress CAR; Future    4. Palpitations  Normal event monitor 3/2020. Likely anxiety induced. If they persist, can consider repeating monitor    5. Sleep apnea  I believe a lot of patient's symptoms are secondary to sleep deprivation which has been aggravating her anxiety.  She is following up with sleep medicine to see when she can get a CPAP replacement    Follow Up {Instructions Charge Capture  Follow-up Communications :23}   Return if symptoms worsen or fail to improve.    Patient was given instructions and counseling regarding her condition or for health maintenance advice. Please see specific information pulled into the AVS if appropriate.  Advised to call the [facility] Center with any questions, concerns, or worsening symptoms.    *Please note that portions of this note were completed with a voice recognition program. Efforts were made to edit the dictations, but occasionally words are mistranscribed.

## 2021-10-25 ENCOUNTER — OFFICE VISIT (OUTPATIENT)
Dept: CARDIOLOGY | Facility: HOSPITAL | Age: 56
End: 2021-10-25

## 2021-10-25 ENCOUNTER — HOSPITAL ENCOUNTER (OUTPATIENT)
Dept: CARDIOLOGY | Facility: HOSPITAL | Age: 56
Discharge: HOME | End: 2021-10-25

## 2021-10-25 VITALS
HEART RATE: 88 BPM | TEMPERATURE: 98.4 F | DIASTOLIC BLOOD PRESSURE: 77 MMHG | SYSTOLIC BLOOD PRESSURE: 130 MMHG | RESPIRATION RATE: 20 BRPM | HEIGHT: 64 IN | WEIGHT: 158.25 LBS | OXYGEN SATURATION: 95 % | BODY MASS INDEX: 27.02 KG/M2

## 2021-10-25 DIAGNOSIS — M79.604 PAIN IN BOTH LOWER EXTREMITIES: ICD-10-CM

## 2021-10-25 DIAGNOSIS — R07.9 CHEST PAIN, UNSPECIFIED TYPE: ICD-10-CM

## 2021-10-25 DIAGNOSIS — R07.9 CHEST PAIN, UNSPECIFIED TYPE: Primary | ICD-10-CM

## 2021-10-25 DIAGNOSIS — R42 LIGHTHEADEDNESS: ICD-10-CM

## 2021-10-25 DIAGNOSIS — M79.605 PAIN IN BOTH LOWER EXTREMITIES: ICD-10-CM

## 2021-10-25 DIAGNOSIS — G47.33 OBSTRUCTIVE SLEEP APNEA SYNDROME: ICD-10-CM

## 2021-10-25 DIAGNOSIS — R00.2 PALPITATIONS: ICD-10-CM

## 2021-10-25 LAB
QT INTERVAL: 370 MS
QTC INTERVAL: 432 MS

## 2021-10-25 PROCEDURE — 93010 ELECTROCARDIOGRAM REPORT: CPT | Performed by: INTERNAL MEDICINE

## 2021-10-25 PROCEDURE — 93005 ELECTROCARDIOGRAM TRACING: CPT | Performed by: NURSE PRACTITIONER

## 2021-10-25 PROCEDURE — 99214 OFFICE O/P EST MOD 30 MIN: CPT | Performed by: NURSE PRACTITIONER

## 2021-10-25 RX ORDER — METHYLPREDNISOLONE 4 MG/1
TABLET ORAL
COMMUNITY
Start: 2021-10-22 | End: 2021-10-25

## 2021-10-25 RX ORDER — AZITHROMYCIN 250 MG/1
TABLET, FILM COATED ORAL
COMMUNITY
Start: 2021-10-22 | End: 2021-10-25

## 2021-10-26 ENCOUNTER — TELEMEDICINE (OUTPATIENT)
Dept: SLEEP MEDICINE | Facility: HOSPITAL | Age: 56
End: 2021-10-26

## 2021-10-26 DIAGNOSIS — G47.34 NOCTURNAL HYPOXEMIA: ICD-10-CM

## 2021-10-26 DIAGNOSIS — E66.3 OVERWEIGHT: ICD-10-CM

## 2021-10-26 DIAGNOSIS — G47.33 OBSTRUCTIVE SLEEP APNEA, ADULT: Primary | ICD-10-CM

## 2021-10-26 PROCEDURE — 99203 OFFICE O/P NEW LOW 30 MIN: CPT | Performed by: INTERNAL MEDICINE

## 2021-11-02 ENCOUNTER — TELEPHONE (OUTPATIENT)
Dept: SLEEP MEDICINE | Facility: HOSPITAL | Age: 56
End: 2021-11-02

## 2021-11-02 NOTE — PROGRESS NOTES
Chief Complaint  Snoring nonrestorative sleep    Subjective         Lisa Limon presents to [facility] for the evaluation of snoring and obstructive sleep apnea.  Her primary care provider is Kimberly DAVIS.  You have chosen to receive care through a telehealth visit.  Do you consent to use a video/audio connection for your medical care today? Yes  History of Present Illness  Patient has a history of snoring for several years.  She had a previous sleep study in 2018 that showed mild obstructive sleep apnea.  We last saw her in clinic in 2018.  She has snoring nonrestorative sleep, obstructive sleep apnea and psychophysiologic insomnia.  Her machine has been recalled.  She says she is not sleeping well and has increased anxiety.  She was formally using her CPAP regularly.  She said a family member had  while sleeping and she was wanting to make sure she used her CPAP regularly.  She felt rested.  She denied having snoring noted.  She says her weight has increased since 2018.  She has had some chest discomfort and is seeing cardiology.    If she falls asleep without her mask she still has snoring and awakens 2-3 times per night.  She is not rested in the morning and has a morning headache.  She will fall asleep if sitting quietly during the day.    She goes to bed between 11 and 11:30 PM.  She will fall asleep quickly.  She awakens 4 times during the night.  She thinks she gets 3 to 4 hours of sleep.  She does not feel rested.  She denies any history of hypertension, diabetes, heart disease.    Allergies are environmental, IV contrast dye, tetanus, Lortab, amoxicillin.    Habits: Tobacco smoked 1/4 pack/day for several years    Alcohol without    Caffeine she has 1 cup of coffee and 1 cola each day.    Medications: Aspirin    Medical illnesses: Anxiety    Surgeries: Without    Family history is positive for heart Disease, sleep apnea, and COPD.    Review of  systems: Positive for fatigue, chest discomfort, palpitations, myalgia, lightheadedness, environmental allergies, and nervousness and anxious.  Other systems reviewed and negative.    McCausland score is 15/24  Objective   Vital Signs:   There were no vitals taken for this visit.    Physical Exam   Patient appears to be awake and alert.  She does not appear to be in acute respiratory distress.  Her stated weight is 158 pounds.  Her height 5 feet 4 inches.  She has Mallampati class IV anatomy.  Body mass index 27          The patient had a sleep study in 2018 showed an AHI of 11.3.     Assessment and Plan   Diagnoses and all orders for this visit:    1. Obstructive sleep apnea, adult (Primary)  -     Detailed AutoPAP Order    2. Overweight    3. Nocturnal hypoxemia  -     Detailed AutoPAP Order    Patient has history of mild obstructive sleep apnea.  She has previously been on CPAP but is quit using his machine due to recall she is fairly anxious.  She is sleepy at this time.  We have discussed the reasons for the recall.  We discussed potential complications of untreated obstructive sleep apnea.  She wishes to get back on CPAP.  We will order her a new machine.  She is encouraged to maintain ideal body weight.  She is encouraged to avoid alcohol and sedatives close to bedtime.  She is encouraged to practice lateral position sleep.  We discussed other potential therapies including weight control, oral appliance, and surgery.  We discussed the long-term complications of untreated obstructive sleep apnea including hypertension, diabetes, heart disease, stroke, and dementia.  She is return in after 2 months and will reassess situation.  I spent 35 minutes caring for Lisa on this date of service. This time includes time spent by me in the following activities:obtaining and/or reviewing a separately obtained history, performing a medically appropriate examination and/or evaluation , counseling and educating the  patient/family/caregiver, ordering medications, tests, or procedures and documenting information in the medical record  Follow Up   Return in about 2 months (around 12/26/2021) for 31 to 90 days after PAP setup, Next scheduled follow-up, Video visit.  Patient was given instructions and counseling regarding her condition or for health maintenance advice. Please see specific information pulled into the AVS if appropriate.   Kevyn Hodge MD [facility]  Sleep Medicine  Pulmonary and Critical Care Medicine

## 2021-11-02 NOTE — TELEPHONE ENCOUNTER
Patient called stating that maryellen has not received her info for setup. Advised patient that we are awaiting her signed office note. Patient states that she has only been sleeping 1-2 hrs a night and really needs to be setup. Patient would like a call back.

## 2021-11-11 ENCOUNTER — HOSPITAL ENCOUNTER (OUTPATIENT)
Dept: CARDIOLOGY | Facility: HOSPITAL | Age: 56
Discharge: HOME | End: 2021-11-11

## 2021-11-11 DIAGNOSIS — R42 LIGHTHEADEDNESS: ICD-10-CM

## 2021-11-11 DIAGNOSIS — M79.605 PAIN IN BOTH LOWER EXTREMITIES: ICD-10-CM

## 2021-11-11 DIAGNOSIS — M79.604 PAIN IN BOTH LOWER EXTREMITIES: ICD-10-CM

## 2021-11-11 LAB
BH CV LOWER ARTERIAL LEFT ABI RATIO: 1.11
BH CV LOWER ARTERIAL LEFT DORSALIS PEDIS SYS MAX: 142 MMHG
BH CV LOWER ARTERIAL LEFT GREAT TOE SYS MAX: 91 MMHG
BH CV LOWER ARTERIAL LEFT POST EX ABI RATIO: 1.05
BH CV LOWER ARTERIAL LEFT POST TIBIAL SYS MAX: 144 MMHG
BH CV LOWER ARTERIAL LEFT TBI RATIO: 0.7
BH CV LOWER ARTERIAL RIGHT ABI RATIO: 1.05
BH CV LOWER ARTERIAL RIGHT DORSALIS PEDIS SYS MAX: 137 MMHG
BH CV LOWER ARTERIAL RIGHT GREAT TOE SYS MAX: 92 MMHG
BH CV LOWER ARTERIAL RIGHT POST EX ABI RATIO: 1.03
BH CV LOWER ARTERIAL RIGHT POST TIBIAL SYS MAX: 137 MMHG
BH CV LOWER ARTERIAL RIGHT TBI RATIO: 0.71
BH CV XLRA MEAS LEFT DIST CCA EDV: 32.8 CM/SEC
BH CV XLRA MEAS LEFT DIST CCA PSV: 80.9 CM/SEC
BH CV XLRA MEAS LEFT DIST ICA EDV: 45.7 CM/SEC
BH CV XLRA MEAS LEFT DIST ICA PSV: 94.4 CM/SEC
BH CV XLRA MEAS LEFT ICA/CCA RATIO: 1.07
BH CV XLRA MEAS LEFT MID CCA EDV: 38.1 CM/SEC
BH CV XLRA MEAS LEFT MID CCA PSV: 87.9 CM/SEC
BH CV XLRA MEAS LEFT MID ICA EDV: 44 CM/SEC
BH CV XLRA MEAS LEFT MID ICA PSV: 84.4 CM/SEC
BH CV XLRA MEAS LEFT PROX CCA EDV: 47.9 CM/SEC
BH CV XLRA MEAS LEFT PROX CCA PSV: 121 CM/SEC
BH CV XLRA MEAS LEFT PROX ECA EDV: 29.1 CM/SEC
BH CV XLRA MEAS LEFT PROX ECA PSV: 91.9 CM/SEC
BH CV XLRA MEAS LEFT PROX ICA EDV: 37.5 CM/SEC
BH CV XLRA MEAS LEFT PROX ICA PSV: 66.3 CM/SEC
BH CV XLRA MEAS LEFT PROX SCLA PSV: 166 CM/SEC
BH CV XLRA MEAS LEFT VERTEBRAL A EDV: 22 CM/SEC
BH CV XLRA MEAS LEFT VERTEBRAL A PSV: 44.8 CM/SEC
BH CV XLRA MEAS RIGHT DIST CCA EDV: 37.5 CM/SEC
BH CV XLRA MEAS RIGHT DIST CCA PSV: 84.4 CM/SEC
BH CV XLRA MEAS RIGHT DIST ICA EDV: 48.1 CM/SEC
BH CV XLRA MEAS RIGHT DIST ICA PSV: 95.6 CM/SEC
BH CV XLRA MEAS RIGHT ICA/CCA RATIO: 1.04
BH CV XLRA MEAS RIGHT MID CCA EDV: 34.6 CM/SEC
BH CV XLRA MEAS RIGHT MID CCA PSV: 91.5 CM/SEC
BH CV XLRA MEAS RIGHT MID ICA EDV: 35.2 CM/SEC
BH CV XLRA MEAS RIGHT MID ICA PSV: 74.5 CM/SEC
BH CV XLRA MEAS RIGHT PROX CCA EDV: 38.1 CM/SEC
BH CV XLRA MEAS RIGHT PROX CCA PSV: 101 CM/SEC
BH CV XLRA MEAS RIGHT PROX ECA EDV: 34 CM/SEC
BH CV XLRA MEAS RIGHT PROX ECA PSV: 97.3 CM/SEC
BH CV XLRA MEAS RIGHT PROX ICA EDV: 28.7 CM/SEC
BH CV XLRA MEAS RIGHT PROX ICA PSV: 70.4 CM/SEC
BH CV XLRA MEAS RIGHT PROX SCLA PSV: 137 CM/SEC
BH CV XLRA MEAS RIGHT VERTEBRAL A EDV: 24 CM/SEC
BH CV XLRA MEAS RIGHT VERTEBRAL A PSV: 52.6 CM/SEC
LEFT ARM BP: 129 MMHG
MAXIMAL PREDICTED HEART RATE: 166 BPM
RIGHT ARM BP: 130 MMHG
STRESS TARGET HR: 141 BPM
UPPER ARTERIAL LEFT ARM BRACHIAL SYS MAX: 129 MMHG
UPPER ARTERIAL RIGHT ARM BRACHIAL SYS MAX: 130 MMHG

## 2021-11-11 PROCEDURE — 93880 EXTRACRANIAL BILAT STUDY: CPT

## 2021-11-11 PROCEDURE — 93924 LWR XTR VASC STDY BILAT: CPT

## 2021-11-11 PROCEDURE — 93924 LWR XTR VASC STDY BILAT: CPT | Performed by: INTERNAL MEDICINE

## 2021-11-11 PROCEDURE — 93880 EXTRACRANIAL BILAT STUDY: CPT | Performed by: INTERNAL MEDICINE

## 2022-01-09 ENCOUNTER — HOSPITAL ENCOUNTER (EMERGENCY)
Facility: HOSPITAL | Age: 57
Discharge: HOME | End: 2022-01-09
Attending: EMERGENCY MEDICINE | Admitting: EMERGENCY MEDICINE

## 2022-01-09 ENCOUNTER — APPOINTMENT (OUTPATIENT)
Dept: GENERAL RADIOLOGY | Facility: HOSPITAL | Age: 57
End: 2022-01-09

## 2022-01-09 VITALS
RESPIRATION RATE: 18 BRPM | BODY MASS INDEX: 26.29 KG/M2 | SYSTOLIC BLOOD PRESSURE: 110 MMHG | HEART RATE: 96 BPM | TEMPERATURE: 98.4 F | HEIGHT: 64 IN | WEIGHT: 154 LBS | DIASTOLIC BLOOD PRESSURE: 78 MMHG | OXYGEN SATURATION: 95 %

## 2022-01-09 DIAGNOSIS — U07.1 COVID-19: Primary | ICD-10-CM

## 2022-01-09 LAB
ALBUMIN SERPL-MCNC: 4.7 G/DL (ref 3.5–5.2)
ALBUMIN/GLOB SERPL: 1.5 G/DL
ALP SERPL-CCNC: 102 U/L (ref 39–117)
ALT SERPL W P-5'-P-CCNC: 18 U/L (ref 1–33)
ANION GAP SERPL CALCULATED.3IONS-SCNC: 13 MMOL/L (ref 5–15)
AST SERPL-CCNC: 19 U/L (ref 1–32)
BASOPHILS # BLD AUTO: 0.01 10*3/MM3 (ref 0–0.2)
BASOPHILS NFR BLD AUTO: 0.2 % (ref 0–1.5)
BILIRUB SERPL-MCNC: 0.8 MG/DL (ref 0–1.2)
BILIRUB UR QL STRIP: NEGATIVE
BUN SERPL-MCNC: 7 MG/DL (ref 6–20)
BUN/CREAT SERPL: 8.2 (ref 7–25)
CALCIUM SPEC-SCNC: 9.3 MG/DL (ref 8.6–10.5)
CHLORIDE SERPL-SCNC: 102 MMOL/L (ref 98–107)
CLARITY UR: CLEAR
CO2 SERPL-SCNC: 23 MMOL/L (ref 22–29)
COLOR UR: YELLOW
CREAT SERPL-MCNC: 0.85 MG/DL (ref 0.57–1)
D-LACTATE SERPL-SCNC: 2.1 MMOL/L (ref 0.5–2)
DEPRECATED RDW RBC AUTO: 46.8 FL (ref 37–54)
EOSINOPHIL # BLD AUTO: 0.01 10*3/MM3 (ref 0–0.4)
EOSINOPHIL NFR BLD AUTO: 0.2 % (ref 0.3–6.2)
ERYTHROCYTE [DISTWIDTH] IN BLOOD BY AUTOMATED COUNT: 13.3 % (ref 12.3–15.4)
FLUAV SUBTYP SPEC NAA+PROBE: NOT DETECTED
FLUBV RNA ISLT QL NAA+PROBE: NOT DETECTED
GFR SERPL CREATININE-BSD FRML MDRD: 70 ML/MIN/1.73
GLOBULIN UR ELPH-MCNC: 3.1 GM/DL
GLUCOSE SERPL-MCNC: 136 MG/DL (ref 65–99)
GLUCOSE UR STRIP-MCNC: NEGATIVE MG/DL
HCT VFR BLD AUTO: 45.3 % (ref 34–46.6)
HGB BLD-MCNC: 15.4 G/DL (ref 12–15.9)
HGB UR QL STRIP.AUTO: NEGATIVE
IMM GRANULOCYTES # BLD AUTO: 0.01 10*3/MM3 (ref 0–0.05)
IMM GRANULOCYTES NFR BLD AUTO: 0.2 % (ref 0–0.5)
KETONES UR QL STRIP: NEGATIVE
LEUKOCYTE ESTERASE UR QL STRIP.AUTO: NEGATIVE
LYMPHOCYTES # BLD AUTO: 1.34 10*3/MM3 (ref 0.7–3.1)
LYMPHOCYTES NFR BLD AUTO: 21.3 % (ref 19.6–45.3)
MCH RBC QN AUTO: 32.1 PG (ref 26.6–33)
MCHC RBC AUTO-ENTMCNC: 34 G/DL (ref 31.5–35.7)
MCV RBC AUTO: 94.4 FL (ref 79–97)
MONOCYTES # BLD AUTO: 0.57 10*3/MM3 (ref 0.1–0.9)
MONOCYTES NFR BLD AUTO: 9 % (ref 5–12)
NEUTROPHILS NFR BLD AUTO: 4.36 10*3/MM3 (ref 1.7–7)
NEUTROPHILS NFR BLD AUTO: 69.1 % (ref 42.7–76)
NITRITE UR QL STRIP: NEGATIVE
NRBC BLD AUTO-RTO: 0 /100 WBC (ref 0–0.2)
PH UR STRIP.AUTO: 6.5 [PH] (ref 5–8)
PLATELET # BLD AUTO: 154 10*3/MM3 (ref 140–450)
PMV BLD AUTO: 9.6 FL (ref 6–12)
POTASSIUM SERPL-SCNC: 3.4 MMOL/L (ref 3.5–5.2)
PROCALCITONIN SERPL-MCNC: 0.05 NG/ML (ref 0–0.25)
PROT SERPL-MCNC: 7.8 G/DL (ref 6–8.5)
PROT UR QL STRIP: NEGATIVE
RBC # BLD AUTO: 4.8 10*6/MM3 (ref 3.77–5.28)
S PYO AG THROAT QL: NEGATIVE
SARS-COV-2 RNA PNL SPEC NAA+PROBE: DETECTED
SODIUM SERPL-SCNC: 138 MMOL/L (ref 136–145)
SP GR UR STRIP: <=1.005 (ref 1–1.03)
TROPONIN T SERPL-MCNC: <0.01 NG/ML (ref 0–0.03)
UROBILINOGEN UR QL STRIP: NORMAL
WBC NRBC COR # BLD: 6.3 10*3/MM3 (ref 3.4–10.8)

## 2022-01-09 PROCEDURE — 84484 ASSAY OF TROPONIN QUANT: CPT | Performed by: PHYSICIAN ASSISTANT

## 2022-01-09 PROCEDURE — 83605 ASSAY OF LACTIC ACID: CPT | Performed by: PHYSICIAN ASSISTANT

## 2022-01-09 PROCEDURE — 87636 SARSCOV2 & INF A&B AMP PRB: CPT | Performed by: EMERGENCY MEDICINE

## 2022-01-09 PROCEDURE — 87880 STREP A ASSAY W/OPTIC: CPT | Performed by: PHYSICIAN ASSISTANT

## 2022-01-09 PROCEDURE — 96375 TX/PRO/DX INJ NEW DRUG ADDON: CPT

## 2022-01-09 PROCEDURE — 25010000002 DEXAMETHASONE PER 1 MG: Performed by: PHYSICIAN ASSISTANT

## 2022-01-09 PROCEDURE — 96374 THER/PROPH/DIAG INJ IV PUSH: CPT

## 2022-01-09 PROCEDURE — 80053 COMPREHEN METABOLIC PANEL: CPT | Performed by: PHYSICIAN ASSISTANT

## 2022-01-09 PROCEDURE — 25010000002 LORAZEPAM PER 2 MG: Performed by: EMERGENCY MEDICINE

## 2022-01-09 PROCEDURE — 81003 URINALYSIS AUTO W/O SCOPE: CPT | Performed by: PHYSICIAN ASSISTANT

## 2022-01-09 PROCEDURE — 25010000002 DIPHENHYDRAMINE PER 50 MG: Performed by: PHYSICIAN ASSISTANT

## 2022-01-09 PROCEDURE — 36415 COLL VENOUS BLD VENIPUNCTURE: CPT

## 2022-01-09 PROCEDURE — 85025 COMPLETE CBC W/AUTO DIFF WBC: CPT | Performed by: PHYSICIAN ASSISTANT

## 2022-01-09 PROCEDURE — 99283 EMERGENCY DEPT VISIT LOW MDM: CPT

## 2022-01-09 PROCEDURE — 71045 X-RAY EXAM CHEST 1 VIEW: CPT

## 2022-01-09 PROCEDURE — 84145 PROCALCITONIN (PCT): CPT | Performed by: PHYSICIAN ASSISTANT

## 2022-01-09 PROCEDURE — 93005 ELECTROCARDIOGRAM TRACING: CPT | Performed by: EMERGENCY MEDICINE

## 2022-01-09 PROCEDURE — 87081 CULTURE SCREEN ONLY: CPT | Performed by: PHYSICIAN ASSISTANT

## 2022-01-09 RX ORDER — LORAZEPAM 2 MG/ML
0.5 INJECTION INTRAMUSCULAR ONCE
Status: COMPLETED | OUTPATIENT
Start: 2022-01-09 | End: 2022-01-09

## 2022-01-09 RX ORDER — DIPHENHYDRAMINE HYDROCHLORIDE 50 MG/ML
25 INJECTION INTRAMUSCULAR; INTRAVENOUS ONCE
Status: COMPLETED | OUTPATIENT
Start: 2022-01-09 | End: 2022-01-09

## 2022-01-09 RX ORDER — DEXAMETHASONE SODIUM PHOSPHATE 4 MG/ML
6 INJECTION, SOLUTION INTRA-ARTICULAR; INTRALESIONAL; INTRAMUSCULAR; INTRAVENOUS; SOFT TISSUE ONCE
Status: COMPLETED | OUTPATIENT
Start: 2022-01-09 | End: 2022-01-09

## 2022-01-09 RX ADMIN — DIPHENHYDRAMINE HYDROCHLORIDE 25 MG: 50 INJECTION INTRAMUSCULAR; INTRAVENOUS at 08:26

## 2022-01-09 RX ADMIN — LORAZEPAM 0.5 MG: 2 INJECTION INTRAMUSCULAR; INTRAVENOUS at 08:26

## 2022-01-09 RX ADMIN — DEXAMETHASONE SODIUM PHOSPHATE 6 MG: 4 INJECTION, SOLUTION INTRA-ARTICULAR; INTRALESIONAL; INTRAMUSCULAR; INTRAVENOUS; SOFT TISSUE at 10:15

## 2022-01-09 NOTE — ED PROVIDER NOTES
"Subjective   Ms. Limon is a 56-year-old female who presents to the emergency department with a 1 week history of sore throat, fevers, headache, loss of taste and smell, mild chest discomfort and diarrhea.  The patient was seen by her PCP last week and started on Zithromax.  She has finished the Zithromax but feels no better.  This morning, she developed an itchy rash on her thighs and lower abdomen.  The patient has a mild nonproductive cough.  No associated abdominal pain, nausea or vomiting.  No urinary symptoms.  The patient states that she has not been vaccinated against COVID-19 and has not had it previously.  She has a history of obstructive sleep apnea and uses CPAP at home.  She smokes less than a pack of cigarettes daily.  No alcohol or drug use.          Review of Systems   Constitutional: Positive for chills, fatigue and fever. Negative for appetite change and diaphoresis.   HENT: Positive for sore throat. Negative for voice change.    Respiratory: Positive for cough (mild) and chest tightness.    Cardiovascular: Positive for chest pain (mild). Negative for palpitations.   Gastrointestinal: Positive for diarrhea. Negative for abdominal pain, blood in stool, nausea and vomiting.   Genitourinary: Negative for dysuria.   Musculoskeletal: Positive for myalgias. Negative for back pain.   Skin: Positive for rash.   Allergic/Immunologic: Negative for immunocompromised state.   Neurological: Positive for headaches.   Hematological: Negative for adenopathy.   Psychiatric/Behavioral: Negative for confusion.       Past Medical History:   Diagnosis Date   • Acid reflux    • Anxiety    • Colon polyps    • Depression    • Gall stones    • Ovarian cyst    • Pancreatitis    • Ulcer    • UTI (urinary tract infection)        Allergies   Allergen Reactions   • Iodinated Diagnostic Agents Anaphylaxis   • Zithromax [Azithromycin] Other (See Comments)     \"Had to go to ER with Chest Pain\"   • Amoxicillin Rash and Other (See " Comments)     Rash and yeast infection   • Hydrocodone-Acetaminophen Rash   • Tetanus Toxoids Rash and GI Intolerance       Past Surgical History:   Procedure Laterality Date   • CHOLECYSTECTOMY  2007   • HYSTERECTOMY  2010   • TUBAL ABDOMINAL LIGATION  1994       Family History   Problem Relation Age of Onset   • Migraines Mother    • Heart attack Father    • Diabetes Paternal Aunt    • Diabetes Paternal Uncle    • No Known Problems Sister    • Stroke Brother    • No Known Problems Maternal Grandmother    • No Known Problems Maternal Grandfather    • No Known Problems Paternal Grandmother    • No Known Problems Paternal Grandfather    • Diabetes Other        Social History     Socioeconomic History   • Marital status:    Tobacco Use   • Smoking status: Current Every Day Smoker     Packs/day: 0.25     Years: 36.00     Pack years: 9.00     Types: Cigarettes     Start date: 1985   • Smokeless tobacco: Never Used   Substance and Sexual Activity   • Alcohol use: No   • Drug use: Never   • Sexual activity: Defer           Objective   Physical Exam  Constitutional:       General: She is not in acute distress.     Appearance: Normal appearance.   HENT:      Head: Normocephalic and atraumatic.      Nose: Nose normal.      Mouth/Throat:      Mouth: Mucous membranes are moist.      Pharynx: Oropharynx is clear. No oropharyngeal exudate or posterior oropharyngeal erythema.   Eyes:      General: No scleral icterus.     Conjunctiva/sclera: Conjunctivae normal.   Cardiovascular:      Rate and Rhythm: Tachycardia present.      Pulses: Normal pulses.      Comments: Tachycardic at 111    Pulmonary:      Effort: Pulmonary effort is normal.   Abdominal:      General: Bowel sounds are normal.      Tenderness: There is no abdominal tenderness. There is no guarding.   Musculoskeletal:         General: No tenderness. Normal range of motion.      Cervical back: Normal range of motion and neck supple.      Right lower leg: No edema.       Left lower leg: No edema.   Skin:     General: Skin is warm and dry.      Findings: Rash (mild slightly raised erythemic rash both thighs and a bit on the lower abdomen) present.   Neurological:      General: No focal deficit present.      Mental Status: She is alert and oriented to person, place, and time.   Psychiatric:      Comments: Anxious, tearful         Procedures           ED Course      The pt is anxious.  No acute distress.  Her COVID-19 test is positive.   Labs are bland.  O2 sats have been in the mid to upper 90s on RA.  I spoke with her about her workup.  Pt was given a dose of Decadron.  Will d/c home to quarantine and will give her a pulse oximeter with instructions for when to return if necessary.                                             MDM    Final diagnoses:   COVID-19       ED Disposition  ED Disposition     ED Disposition Condition Comment    Discharge Stable           Natasha Torres, SUSAN  [address] [facility] [address]  [phone]      As needed    [facility]  [address] [address] [phone]    If symptoms worsen         Medication List      No changes were made to your prescriptions during this visit.          Pascual Quintana PA  01/09/22 0906

## 2022-01-09 NOTE — DISCHARGE INSTRUCTIONS
Rest.  Quarantine at home for another 5 days.  Check your O2 sats with a pulse oximeter and return to the ER if your O2 sats drop to 94% or less for any length of time, or if you are otherwise worse.  Tylenol or Motrin as directed for pain and fever.

## 2022-01-10 ENCOUNTER — HOSPITAL ENCOUNTER (EMERGENCY)
Facility: HOSPITAL | Age: 57
Discharge: HOME | End: 2022-01-10
Attending: EMERGENCY MEDICINE | Admitting: STUDENT IN AN ORGANIZED HEALTH CARE EDUCATION/TRAINING PROGRAM

## 2022-01-10 ENCOUNTER — HOSPITAL ENCOUNTER (EMERGENCY)
Facility: HOSPITAL | Age: 57
Discharge: HOME | End: 2022-01-10
Attending: EMERGENCY MEDICINE | Admitting: EMERGENCY MEDICINE

## 2022-01-10 ENCOUNTER — APPOINTMENT (OUTPATIENT)
Dept: GENERAL RADIOLOGY | Facility: HOSPITAL | Age: 57
End: 2022-01-10

## 2022-01-10 VITALS
BODY MASS INDEX: 26.29 KG/M2 | RESPIRATION RATE: 20 BRPM | TEMPERATURE: 97.6 F | OXYGEN SATURATION: 94 % | HEART RATE: 87 BPM | SYSTOLIC BLOOD PRESSURE: 126 MMHG | HEIGHT: 64 IN | WEIGHT: 154 LBS | DIASTOLIC BLOOD PRESSURE: 80 MMHG

## 2022-01-10 VITALS
SYSTOLIC BLOOD PRESSURE: 111 MMHG | DIASTOLIC BLOOD PRESSURE: 70 MMHG | WEIGHT: 154 LBS | TEMPERATURE: 97.8 F | BODY MASS INDEX: 26.29 KG/M2 | HEART RATE: 94 BPM | RESPIRATION RATE: 20 BRPM | HEIGHT: 64 IN | OXYGEN SATURATION: 95 %

## 2022-01-10 DIAGNOSIS — R21 RASH ASSOCIATED WITH COVID-19: Primary | ICD-10-CM

## 2022-01-10 DIAGNOSIS — L29.9 PRURITUS: ICD-10-CM

## 2022-01-10 DIAGNOSIS — F41.8 ANXIETY ABOUT HEALTH: ICD-10-CM

## 2022-01-10 DIAGNOSIS — R09.02 HYPOXIA: ICD-10-CM

## 2022-01-10 DIAGNOSIS — E87.6 HYPOKALEMIA: ICD-10-CM

## 2022-01-10 DIAGNOSIS — U07.1 RASH ASSOCIATED WITH COVID-19: Primary | ICD-10-CM

## 2022-01-10 DIAGNOSIS — R79.89 POSITIVE D DIMER: ICD-10-CM

## 2022-01-10 DIAGNOSIS — U07.1 COVID-19: Primary | ICD-10-CM

## 2022-01-10 LAB
ALBUMIN SERPL-MCNC: 4.5 G/DL (ref 3.5–5.2)
ALBUMIN/GLOB SERPL: 1.5 G/DL
ALP SERPL-CCNC: 100 U/L (ref 39–117)
ALT SERPL W P-5'-P-CCNC: 16 U/L (ref 1–33)
ANION GAP SERPL CALCULATED.3IONS-SCNC: 15 MMOL/L (ref 5–15)
AST SERPL-CCNC: 18 U/L (ref 1–32)
BASOPHILS # BLD AUTO: 0.02 10*3/MM3 (ref 0–0.2)
BASOPHILS NFR BLD AUTO: 0.2 % (ref 0–1.5)
BILIRUB SERPL-MCNC: 0.7 MG/DL (ref 0–1.2)
BUN SERPL-MCNC: 11 MG/DL (ref 6–20)
BUN/CREAT SERPL: 14.1 (ref 7–25)
CALCIUM SPEC-SCNC: 9.3 MG/DL (ref 8.6–10.5)
CHLORIDE SERPL-SCNC: 103 MMOL/L (ref 98–107)
CO2 SERPL-SCNC: 20 MMOL/L (ref 22–29)
CREAT SERPL-MCNC: 0.78 MG/DL (ref 0.57–1)
D DIMER PPP FEU-MCNC: 1.61 MCGFEU/ML (ref 0–0.56)
DEPRECATED RDW RBC AUTO: 47.7 FL (ref 37–54)
EOSINOPHIL # BLD AUTO: 0.01 10*3/MM3 (ref 0–0.4)
EOSINOPHIL NFR BLD AUTO: 0.1 % (ref 0.3–6.2)
ERYTHROCYTE [DISTWIDTH] IN BLOOD BY AUTOMATED COUNT: 13.4 % (ref 12.3–15.4)
GFR SERPL CREATININE-BSD FRML MDRD: 77 ML/MIN/1.73
GLOBULIN UR ELPH-MCNC: 3.1 GM/DL
GLUCOSE SERPL-MCNC: 131 MG/DL (ref 65–99)
HCT VFR BLD AUTO: 45.7 % (ref 34–46.6)
HGB BLD-MCNC: 15.6 G/DL (ref 12–15.9)
HOLD SPECIMEN: NORMAL
IMM GRANULOCYTES # BLD AUTO: 0.02 10*3/MM3 (ref 0–0.05)
IMM GRANULOCYTES NFR BLD AUTO: 0.2 % (ref 0–0.5)
LYMPHOCYTES # BLD AUTO: 2.62 10*3/MM3 (ref 0.7–3.1)
LYMPHOCYTES NFR BLD AUTO: 27.2 % (ref 19.6–45.3)
MCH RBC QN AUTO: 32.4 PG (ref 26.6–33)
MCHC RBC AUTO-ENTMCNC: 34.1 G/DL (ref 31.5–35.7)
MCV RBC AUTO: 95 FL (ref 79–97)
MONOCYTES # BLD AUTO: 0.97 10*3/MM3 (ref 0.1–0.9)
MONOCYTES NFR BLD AUTO: 10.1 % (ref 5–12)
NEUTROPHILS NFR BLD AUTO: 6 10*3/MM3 (ref 1.7–7)
NEUTROPHILS NFR BLD AUTO: 62.2 % (ref 42.7–76)
NRBC BLD AUTO-RTO: 0 /100 WBC (ref 0–0.2)
NT-PROBNP SERPL-MCNC: 82.5 PG/ML (ref 0–900)
PLATELET # BLD AUTO: 209 10*3/MM3 (ref 140–450)
PMV BLD AUTO: 9.7 FL (ref 6–12)
POTASSIUM SERPL-SCNC: 3.4 MMOL/L (ref 3.5–5.2)
PROT SERPL-MCNC: 7.6 G/DL (ref 6–8.5)
RBC # BLD AUTO: 4.81 10*6/MM3 (ref 3.77–5.28)
SODIUM SERPL-SCNC: 138 MMOL/L (ref 136–145)
TROPONIN T SERPL-MCNC: <0.01 NG/ML (ref 0–0.03)
WBC NRBC COR # BLD: 9.64 10*3/MM3 (ref 3.4–10.8)
WHOLE BLOOD HOLD SPECIMEN: NORMAL
WHOLE BLOOD HOLD SPECIMEN: NORMAL

## 2022-01-10 PROCEDURE — 25010000002 DEXAMETHASONE PER 1 MG: Performed by: EMERGENCY MEDICINE

## 2022-01-10 PROCEDURE — 80053 COMPREHEN METABOLIC PANEL: CPT

## 2022-01-10 PROCEDURE — 93005 ELECTROCARDIOGRAM TRACING: CPT | Performed by: EMERGENCY MEDICINE

## 2022-01-10 PROCEDURE — 99283 EMERGENCY DEPT VISIT LOW MDM: CPT

## 2022-01-10 PROCEDURE — 93005 ELECTROCARDIOGRAM TRACING: CPT

## 2022-01-10 PROCEDURE — 96374 THER/PROPH/DIAG INJ IV PUSH: CPT

## 2022-01-10 PROCEDURE — 84484 ASSAY OF TROPONIN QUANT: CPT

## 2022-01-10 PROCEDURE — 85379 FIBRIN DEGRADATION QUANT: CPT | Performed by: EMERGENCY MEDICINE

## 2022-01-10 PROCEDURE — 83880 ASSAY OF NATRIURETIC PEPTIDE: CPT

## 2022-01-10 PROCEDURE — 85025 COMPLETE CBC W/AUTO DIFF WBC: CPT

## 2022-01-10 RX ORDER — HYDROXYZINE PAMOATE 50 MG/1
50 CAPSULE ORAL 3 TIMES DAILY PRN
Qty: 30 CAPSULE | Refills: 0 | Status: SHIPPED | OUTPATIENT
Start: 2022-01-10 | End: 2022-01-14 | Stop reason: HOSPADM

## 2022-01-10 RX ORDER — SODIUM CHLORIDE 0.9 % (FLUSH) 0.9 %
10 SYRINGE (ML) INJECTION AS NEEDED
Status: DISCONTINUED | OUTPATIENT
Start: 2022-01-10 | End: 2022-01-10 | Stop reason: HOSPADM

## 2022-01-10 RX ORDER — HYDROXYZINE HYDROCHLORIDE 25 MG/1
50 TABLET, FILM COATED ORAL ONCE
Status: COMPLETED | OUTPATIENT
Start: 2022-01-10 | End: 2022-01-10

## 2022-01-10 RX ORDER — SODIUM CHLORIDE 0.9 % (FLUSH) 0.9 %
10 SYRINGE (ML) INJECTION AS NEEDED
Status: DISCONTINUED | OUTPATIENT
Start: 2022-01-10 | End: 2022-01-11 | Stop reason: HOSPADM

## 2022-01-10 RX ORDER — HYDROXYZINE HYDROCHLORIDE 25 MG/1
25 TABLET, FILM COATED ORAL 3 TIMES DAILY PRN
Qty: 9 TABLET | Refills: 0 | Status: SHIPPED | OUTPATIENT
Start: 2022-01-10 | End: 2022-01-14 | Stop reason: HOSPADM

## 2022-01-10 RX ORDER — DEXAMETHASONE SODIUM PHOSPHATE 10 MG/ML
6 INJECTION INTRAMUSCULAR; INTRAVENOUS ONCE
Status: COMPLETED | OUTPATIENT
Start: 2022-01-10 | End: 2022-01-10

## 2022-01-10 RX ORDER — DEXAMETHASONE 1 MG
2 TABLET ORAL
Qty: 6 TABLET | Refills: 0 | Status: SHIPPED | OUTPATIENT
Start: 2022-01-10 | End: 2022-01-11

## 2022-01-10 RX ADMIN — HYDROXYZINE HYDROCHLORIDE 50 MG: 25 TABLET, FILM COATED ORAL at 22:08

## 2022-01-10 RX ADMIN — DEXAMETHASONE SODIUM PHOSPHATE 6 MG: 10 INJECTION INTRAMUSCULAR; INTRAVENOUS at 13:31

## 2022-01-10 RX ADMIN — SODIUM CHLORIDE 1000 ML: 9 INJECTION, SOLUTION INTRAVENOUS at 22:07

## 2022-01-10 NOTE — ED PROVIDER NOTES
Subjective   This is a pleasant 56-year-old female not currently employed. She is . She has not been vaccinated against COVID and neither has her . Her primary care is in [place]. She is a smoker but she tells me she is trying to quit. She had not tested for COVID during this illness prior to yesterday.     She was seen yesterday in the emergency department by Pascual Quintana and I have reviewed that note. She was discovered to be COVID-positive at that time. She reports she has not been feeling well for probably 10 days or so when she developed a cough and congestion and she started herself on doxycycline which she had leftover from an old prescription. She continued to feel poorly called her primary care doctor was started on azithromycin which she continues on. She presented yesterday evening to the ED here with continued cough and congestion. Chest x-ray yesterday showed mild discoid type atelectasis. Her COVID was positive. She has slight elevation of lactic acid 2.1. Her oxygen saturation were noted to be in the mid to upper 90s. Patient was quite anxious which she has at her baseline she tells me. She was given 1 dose of Decadron IV and discharged home.     At home she continued to have cough and congestion then apparently broke out in a very pruritic rash on her abdomen a little bit on her back and on her legs and she was miserable with this. She took a couple Benadryl which she said helped some. The rash was bad this morning and she presented to the emergency department here again today for continued evaluation.     She reports she has continued to cough has been nonproductive she has had a slight sore throat minimal rhinorrhea. She says the rash is much less itchy now than what it was. She has had no nausea or vomiting. She is not passed blood per any orifice. She does have a little bit of shortness of breath when she coughs. She has no pleuritic chest pain at this time.           All other  "systems are reviewed and are negative except as noted above.                   Review of Systems   All other systems reviewed and are negative.      Past Medical History:   Diagnosis Date   • Acid reflux    • Anxiety    • Colon polyps    • Depression    • Gall stones    • Ovarian cyst    • Pancreatitis    • Ulcer    • UTI (urinary tract infection)        Allergies   Allergen Reactions   • Iodinated Diagnostic Agents Anaphylaxis   • Zithromax [Azithromycin] Other (See Comments)     \"Had to go to ER with Chest Pain\"   • Amoxicillin Rash and Other (See Comments)     Rash and yeast infection   • Hydrocodone-Acetaminophen Rash   • Tetanus Toxoids Rash and GI Intolerance       Past Surgical History:   Procedure Laterality Date   • CHOLECYSTECTOMY  2007   • HYSTERECTOMY  2010   • TUBAL ABDOMINAL LIGATION  1994       Family History   Problem Relation Age of Onset   • Migraines Mother    • Heart attack Father    • Diabetes Paternal Aunt    • Diabetes Paternal Uncle    • No Known Problems Sister    • Stroke Brother    • No Known Problems Maternal Grandmother    • No Known Problems Maternal Grandfather    • No Known Problems Paternal Grandmother    • No Known Problems Paternal Grandfather    • Diabetes Other        Social History     Socioeconomic History   • Marital status:    Tobacco Use   • Smoking status: Current Every Day Smoker     Packs/day: 0.25     Years: 36.00     Pack years: 9.00     Types: Cigarettes     Start date: 1985   • Smokeless tobacco: Never Used   Substance and Sexual Activity   • Alcohol use: No   • Drug use: Never   • Sexual activity: Defer           Objective   Physical Exam  Vitals and nursing note reviewed. Exam conducted with a chaperone present.   Constitutional:       Appearance: She is normal weight.      Comments: This is a 56-year-old female who is alert and oriented GCS 15. She coughs quite a bit but has not coughed anything up. She is speaking in complete sentences. Oxygen saturation " 92% to 93% on room air.    HENT:      Head: Normocephalic and atraumatic.      Right Ear: External ear normal.      Nose: Nose normal.      Mouth/Throat:      Mouth: Mucous membranes are moist.      Pharynx: Oropharynx is clear.   Eyes:      Extraocular Movements: Extraocular movements intact.      Conjunctiva/sclera: Conjunctivae normal.      Pupils: Pupils are equal, round, and reactive to light.   Cardiovascular:      Rate and Rhythm: Normal rate and regular rhythm.      Pulses: Normal pulses.      Heart sounds: Normal heart sounds.   Pulmonary:      Effort: Pulmonary effort is normal.      Comments:  Occasional rhonchorous breath sound bilaterally.  Abdominal:      General: Abdomen is flat. Bowel sounds are normal. There is no distension.      Palpations: Abdomen is soft. There is no mass.      Tenderness: There is no abdominal tenderness.   Musculoskeletal:         General: No swelling or tenderness. Normal range of motion.      Comments: No venous cords. No edema. Good pulses.    Skin:     General: Skin is warm.      Capillary Refill: Capillary refill takes less than 2 seconds.      Comments: HerSkin examined with a paramedic chaperone. Back shows no evidence of a confluent rash that I can see. Her abdomen axilla palms have no evidence of a confluent rash. Her upper thighs have occasional red dot of uncertain significance but but certainly no confluent rash and the patient says this is much improved from what she was. She said the worst rationales on her left calf and lower lip posteriorly around the popliteal fossa she may have slight redness there but this is a fairly subtle finding.    Neurological:      General: No focal deficit present.      Mental Status: She is alert and oriented to person, place, and time.      Comments: Face is symmetric voice is soft tongue is midline. Vision, hearing, and speech preserved. She has no focal weakness         Procedures           ED Course      No results found for  this or any previous visit (from the past 24 hour(s)).  Note: In addition to lab results from this visit, the labs listed above may include labs taken at another facility or during a different encounter within the last 24 hours. Please correlate lab times with ED admission and discharge times for further clarification of the services performed during this visit.    No orders to display     Vitals:    01/10/22 1130 01/10/22 1156 01/10/22 1244 01/10/22 1334   BP: 115/82  135/82 126/80   BP Location:    Right arm   Patient Position:    Sitting   Pulse: 92 89  87   Resp:    20   Temp:       TempSrc:       SpO2: 92% 95% 90% 94%   Weight:       Height:         Medications   dexamethasone (DECADRON) injection 6 mg (6 mg Intravenous Given 1/10/22 1331)     ECG/EMG Results (last 24 hours)     ** No results found for the last 24 hours. **        ECG 12 Lead   Final Result   Test Reason : SOA Protocol   Blood Pressure :   */*   mmHG   Vent. Rate : 110 BPM     Atrial Rate : 110 BPM      P-R Int : 118 ms          QRS Dur :  74 ms       QT Int : 304 ms       P-R-T Axes :  98  68  76 degrees      QTc Int : 411 ms      Sinus tachycardia   Cannot rule out Anterior infarct , age undetermined   Abnormal ECG   When compared with ECG of 09-JAN-2022 07:52, (Unconfirmed)   Nonspecific T wave abnormality now evident in Anterior leads   Confirmed by JULES ZEPEDA ([id number]) on 1/11/2022 6:31:22 AM      Referred By:            Confirmed By: JULES ZEPEDA                                                   [facility]  Number of Diagnoses or Management Options  COVID-19  Hypokalemia  Hypoxia  Positive D dimer  Pruritus  Diagnosis management comments:     have reviewed all available studies at the bedside with the patient. She has normal troponin and BNP. Her glucose is slightly elevated at 131 and her potassium is slightly low at 3.4. Her LFTs are unremarkable procalcitonin from yesterday was normal 0.05. Her lactic yesterday was slightly elevated at  2.1. Her white blood cell count today is normal rest of her CBC is unremarkable as well but her D-dimer is elevated 1.61. Urinalysis from yesterday is unremarkable.     Given that patient's persistent hypoxia and positive D-dimer I thought she needed a CTA of the chest and admission to the hospital for IV Decadron and remdesivir. She tells me she absolutely does not want remdesivir. She does not want to be admitted to the hospital she tells me her D-dimer is always elevated and she does not wish a CTA of the chest at this time. Talked about the rationale for doing these test and admission but she would rather go home and just wants treatment for the pruritus. Told her this was not optimal but certainly she is alert and oriented and can make her own decisions. I will have her sign out AGAINST MEDICAL ADVICE but I have emphasized the fact we are always open and she should check her oxygen level at home frequently with pulse oximeter and she should return to the ED for reevaluation if she feels worse in any way for consideration of admission and above outlined plan. She understands the risk involved in this.     I talked her about antibody infusion as well as an outpatient but it 10 days out I do not think she is a candidate at this point. Also talked her about the fact that the antibody against omicron is in quite short supply in the oral medication is not available currently for us.     Patient understands all of this and still does not wish to be admitted to the hospital. I have asked her to follow-up with her PCP. I have given her 1 dose of Decadron here and wrote her for another 6 mg to use tomorrow. And again I have reemphasized she should return for reevaluation if she feels worse and consideration of admission.       Amount and/or Complexity of Data Reviewed  Clinical lab tests: reviewed  Tests in the medicine section of CPT®: reviewed        Final diagnoses:   COVID-19   Hypoxia   Pruritus   Positive D dimer    Hypokalemia       ED Disposition  ED Disposition     ED Disposition Condition Comment    AMSAMANTHA Stable           Natasha Torres, APRN  [address] [facility] [address] [phone]    In 1 day           Medication List      New Prescriptions    hydrOXYzine 25 MG tablet  Commonly known as: ATARAX  Take 1 tablet by mouth 3 (Three) Times a Day As Needed for Itching.        ASK your doctor about these medications    dexamethasone 1 MG tablet  Commonly known as: DECADRON  Take 2 tablets by mouth 3 (Three) Times a Day With Meals for 1 day.  Ask about: Should I take this medication?           Where to Get Your Medications      These medications were sent to [facility] [address] [place] [address] - [address] [place] [address] [phone]  - [phone] [place] [id number]    Phone: [phone]   · dexamethasone 1 MG tablet  · hydrOXYzine 25 MG tablet          Derrick Sánchez MD  01/12/22 2404

## 2022-01-10 NOTE — DISCHARGE INSTRUCTIONS
Infection Prevention in the Home  If you have an infection, may have been exposed to an infection, or are taking care of someone who has an infection, it is important to know how to keep the infection from spreading. Follow your health care provider's instructions and use these guidelines to help stop the spread of infection.  How infections are spread  In order for an infection to spread, the following must be present:  A germ. This may be a virus, bacteria, fungus, or parasite.  A place for the germ to live. This may be:  On or in a person, animal, plant, or food.  In soil or water.  On surfaces, such as a door handle.  A person or animal who can develop a disease if the germ enters the body (host). The host does not have resistance to the germ.  A way for the germ to enter the host. This may occur by:  Direct contact with an infected person or animal. This can happen through shaking hands or hugging. Some germs can also travel through the air and spread to others. This can happen when an infected person coughs or sneezes on or near other people.  Indirect contact. This occurs when the germ enters the host through contact with an infected object. Examples include:  Eating or drinking food or water that has the germ (is contaminated).  Touching a contaminated surface with your hands, and then touching your face, eyes, nose, or mouth.  Supplies needed:  Soap.  Alcohol-based hand .  Standard cleaning products.  Disinfectants, such as bleach.  Reusable cleaning cloths, sponges, or paper towels.  Disposable or reusable utility gloves.  How to prevent infection from spreading  There are several things that you can do to help prevent infection from spreading.  Take these general actions  Everyone should take the following actions to prevent the spread of infection:  Wash your hands often with soap and water for at least 20 seconds. If soap and water are not available, use alcohol-based hand .  Avoid  touching your face, mouth, nose, or eyes.  Cough or sneeze into a tissue, sleeve, or elbow instead of into your hand or into the air.  If you cough or sneeze into a tissue, throw it away immediately and wash your hands.    Keep your bathroom clean  Provide soap.  Change towels and washcloths frequently.  Change toothbrushes often and store them separately in a clean, dry place.  Clean and disinfect all surfaces, including the toilet, floor, tub, shower, and sink.  Do not share personal items, such as razors, toothbrushes, deodorant, vogel, brushes, towels, and washcloths.  Maintain hygiene in the kitchen    Wash your hands before and after preparing food and before you eat.  Clean the inside of your refrigerator each week.  Keep your refrigerator set at 40°F (4°C) or less, and set your freezer at 0°F (-18°C) or less.  Keep work surfaces clean. Disinfect them regularly.  Wash your dishes in hot, soapy water. Air-dry your dishes or use a .  Do not share dishes or eating utensils.    Handle food safely  Store food carefully.  Refrigerate leftovers promptly in covered containers.  Throw out stale or spoiled food.  Thaw foods in the refrigerator or microwave, not at room temperature.  Serve foods at the proper temperature. Do not eat raw meat. Make sure it is cooked to the appropriate temperature. Cook eggs until they are firm.  Wash fruits and vegetables under running water.  Use separate cutting boards, plates, and utensils for raw foods and cooked foods.  Use a clean spoon each time you sample food while cooking.  Do laundry the right way  Wear gloves if laundry is visibly soiled.  Do not shake soiled laundry. Doing that may send germs into the air.  Wash laundry in hot water.  If you cannot wash the laundry right away, place it in a plastic bag and wash it as soon as possible.  Be careful around animals and pets  Wash your hands before and after touching animals.  If you have a pet, ensure that your pet  stays clean. Do not let people with weak immune systems touch bird droppings, fish tank water, or a litter box.  If you have a pet cage or litter box, be sure to clean it every day.  If you are sick, stay away from animals and have someone else care for them if possible.  How to clean and disinfect objects and surfaces  Precautions  Some disinfectants work for certain germs and not others. Read the 's instructions or read online resources to determine if the product you are using will work for the germ you are trying to remove.  If you choose to use bleach, use it safely. Never mix it with other cleaning products, especially those that contain ammonia. This mixture can create a dangerous gas that may be deadly.  Keep proper movement of fresh air in your home (ventilation).  Pour used mop water down the utility sink or toilet. Do not pour this water down the kitchen sink.  Objects and surfaces    If surfaces are visibly soiled, clean them first with soap and water before disinfecting.  Disinfect surfaces that are frequently touched every day. This may include:  Counters.  Tables.  Doorknobs.  Sinks and faucets.  Electronics, such as:  Phones.  Remote controls.  Keyboards.  Computers and tablets.    Cleaning supplies  Some cleaning supplies can breed germs. Take good care of them to prevent germs from spreading. To do this:  Soak toilet brushes, mops, and sponges in bleach and water for 5 minutes after each use, or according to 's instructions.  Wash reusable cleaning cloths and sanitize sponges after each use.  Throw away disposable gloves after one use.  Replace reusable utility gloves if they are cracked or torn or if they start to peel.  Additional actions if you are sick  If you live with other people:    Avoid close contact with those around you. Stay at least 3 ft (1 m) away from others, if possible.  Use a separate bathroom, if possible.  If possible, sleep in a separate bedroom or in a  separate bed to prevent infecting other household members.  Change bedroom linens each week or whenever they are soiled.  Have everyone in the household wash hands often with soap and water. If soap and water are not available, use alcohol-based hand .    In general:  Stay home except to get medical care. Call ahead before visiting your health care provider.  Ask others to get groceries and household supplies and to refill prescriptions for you.  Avoid public areas. Try not to take public transportation.  If you can, wear a mask if you need to go out of the house, or if you are in close contact with someone who is not sick.  Avoid visitors until you have completely recovered, or until you have no signs and symptoms of infection.  Avoid preparing food or providing care for others. If you must prepare food or provide care for others, wear a mask and wash your hands before and after doing these things.  Where to find more information  Centers for Disease Control and Prevention: www.cdc.gov/nonpharmaceutical-interventions/index.html  World Health Organization (WHO): www.who.int/infection-prevention/about/en/  Association for Professionals in Infection Control and Epidemiology: professionals.site.apic.org/settings-of-care/non-healthcare-setting/home/  Summary  It is important to know how to keep the infection from spreading.  Make sure everyone in your household washes their hands often with soap and water.  Disinfect surfaces that are frequently touched every day.  If you are sick, stay home except to get medical care.  This information is not intended to replace advice given to you by your health care provider. Make sure you discuss any questions you have with your health care provider.  Document Revised: 02/22/2021 Document Reviewed: 03/13/2020  Elsevier Patient Education © 2021 [employer]      Check your oxygen level 4 times a day at home if it gets any low or you feel worse you need to come back and let  us reassess you.  Consider getting the COVID-vaccine after your period of isolation is complete.

## 2022-01-10 NOTE — ED PROVIDER NOTES
Subjective   This is a pleasant 56-year-old female not currently employed. She is . She has not been vaccinated against COVID and neither has her . Her primary care is in [place]. She is a smoker but she tells me she is trying to quit. She had not tested for COVID during this illness prior to yesterday.    She was seen yesterday in the emergency department by Pascual Quintana and I have reviewed that note. She was discovered to be COVID-positive at that time. She reports she has not been feeling well for probably 10 days or so when she developed a cough and congestion and she started herself on doxycycline which she had leftover from an old prescription. She continued to feel poorly called her primary care doctor was started on azithromycin which she continues on. She presented yesterday evening to the ED here with continued cough and congestion. Chest x-ray yesterday showed mild discoid type atelectasis. Her COVID was positive. She has slight elevation of lactic acid 2.1. Her oxygen saturation were noted to be in the mid to upper 90s. Patient was quite anxious which she has at her baseline she tells me. She was given 1 dose of Decadron IV and discharged home.    At home she continued to have cough and congestion then apparently broke out in a very pruritic rash on her abdomen a little bit on her back and on her legs and she was miserable with this. She took a couple Benadryl which she said helped some. The rash was bad this morning and she presented to the emergency department here again today for continued evaluation.    She reports she has continued to cough has been nonproductive she has had a slight sore throat minimal rhinorrhea. She says the rash is much less itchy now than what it was. She has had no nausea or vomiting. She is not passed blood per any orifice. She does have a little bit of shortness of breath when she coughs. She has no pleuritic chest pain at this time.        All other  "systems are reviewed and are negative except as noted above.          Review of Systems   All other systems reviewed and are negative.      Past Medical History:   Diagnosis Date   • Acid reflux    • Anxiety    • Colon polyps    • Depression    • Gall stones    • Ovarian cyst    • Pancreatitis    • Ulcer    • UTI (urinary tract infection)        Allergies   Allergen Reactions   • Iodinated Diagnostic Agents Anaphylaxis   • Zithromax [Azithromycin] Other (See Comments)     \"Had to go to ER with Chest Pain\"   • Amoxicillin Rash and Other (See Comments)     Rash and yeast infection   • Hydrocodone-Acetaminophen Rash   • Tetanus Toxoids Rash and GI Intolerance       Past Surgical History:   Procedure Laterality Date   • CHOLECYSTECTOMY  2007   • HYSTERECTOMY  2010   • TUBAL ABDOMINAL LIGATION  1994       Family History   Problem Relation Age of Onset   • Migraines Mother    • Heart attack Father    • Diabetes Paternal Aunt    • Diabetes Paternal Uncle    • No Known Problems Sister    • Stroke Brother    • No Known Problems Maternal Grandmother    • No Known Problems Maternal Grandfather    • No Known Problems Paternal Grandmother    • No Known Problems Paternal Grandfather    • Diabetes Other        Social History     Socioeconomic History   • Marital status:    Tobacco Use   • Smoking status: Current Every Day Smoker     Packs/day: 0.25     Years: 36.00     Pack years: 9.00     Types: Cigarettes     Start date: 1985   • Smokeless tobacco: Never Used   Substance and Sexual Activity   • Alcohol use: No   • Drug use: Never   • Sexual activity: Defer           Objective   Physical Exam  Vitals and nursing note reviewed. Exam conducted with a chaperone present.   Constitutional:       Appearance: She is normal weight.      Comments: This is a 56-year-old female who is alert and oriented GCS 15. She coughs quite a bit but has not coughed anything up. She is speaking in complete sentences. Oxygen saturation 92% to 93% " on room air.   HENT:      Head: Normocephalic and atraumatic.      Right Ear: External ear normal.      Left Ear: External ear normal.      Nose: Nose normal.      Mouth/Throat:      Mouth: Mucous membranes are moist.      Pharynx: Oropharynx is clear.   Eyes:      Extraocular Movements: Extraocular movements intact.      Conjunctiva/sclera: Conjunctivae normal.      Pupils: Pupils are equal, round, and reactive to light.   Cardiovascular:      Rate and Rhythm: Normal rate and regular rhythm.      Pulses: Normal pulses.      Heart sounds: Normal heart sounds.   Pulmonary:      Effort: No respiratory distress.      Comments: Occasional rhonchorous breath sound bilaterally.  Abdominal:      General: Abdomen is flat. Bowel sounds are normal. There is no distension.      Palpations: Abdomen is soft. There is no mass.   Musculoskeletal:         General: No swelling, tenderness or deformity. Normal range of motion.      Cervical back: Normal range of motion and neck supple.      Comments: No venous cords. No edema. Good pulses.   Skin:     Capillary Refill: Capillary refill takes less than 2 seconds.      Comments: HerSkin examined with a paramedic chaperone. Back shows no evidence of a confluent rash that I can see. Her abdomen axilla palms have no evidence of a confluent rash. Her upper thighs have occasional red dot of uncertain significance but but certainly no confluent rash and the patient says this is much improved from what she was. She said the worst rationales on her left calf and lower lip posteriorly around the popliteal fossa she may have slight redness there but this is a fairly subtle finding.   Neurological:      Mental Status: She is alert.      Comments: Face is symmetric voice is soft tongue is midline. Vision, hearing, and speech preserved. She has no focal weakness.         Procedures           ED Course      Recent Results (from the past 24 hour(s))   Comprehensive Metabolic Panel    Collection Time:  01/10/22 10:57 AM    Specimen: Blood   Result Value Ref Range    Glucose 131 (H) 65 - 99 mg/dL    BUN 11 6 - 20 mg/dL    Creatinine 0.78 0.57 - 1.00 mg/dL    Sodium 138 136 - 145 mmol/L    Potassium 3.4 (L) 3.5 - 5.2 mmol/L    Chloride 103 98 - 107 mmol/L    CO2 20.0 (L) 22.0 - 29.0 mmol/L    Calcium 9.3 8.6 - 10.5 mg/dL    Total Protein 7.6 6.0 - 8.5 g/dL    Albumin 4.50 3.50 - 5.20 g/dL    ALT (SGPT) 16 1 - 33 U/L    AST (SGOT) 18 1 - 32 U/L    Alkaline Phosphatase 100 39 - 117 U/L    Total Bilirubin 0.7 0.0 - 1.2 mg/dL    eGFR Non African Amer 77 >60 mL/min/1.73    Globulin 3.1 gm/dL    A/G Ratio 1.5 g/dL    BUN/Creatinine Ratio 14.1 7.0 - 25.0    Anion Gap 15.0 5.0 - 15.0 mmol/L   BNP    Collection Time: 01/10/22 10:57 AM    Specimen: Blood   Result Value Ref Range    proBNP 82.5 0.0 - 900.0 pg/mL   Troponin    Collection Time: 01/10/22 10:57 AM    Specimen: Blood   Result Value Ref Range    Troponin T <0.010 0.000 - 0.030 ng/mL   Green Top (Gel)    Collection Time: 01/10/22 10:57 AM   Result Value Ref Range    Extra Tube Hold for add-ons.    Lavender Top    Collection Time: 01/10/22 10:57 AM   Result Value Ref Range    Extra Tube hold for add-on    Gold Top - SST    Collection Time: 01/10/22 10:57 AM   Result Value Ref Range    Extra Tube Hold for add-ons.    Gray Top    Collection Time: 01/10/22 10:57 AM   Result Value Ref Range    Extra Tube Hold for add-ons.    Light Blue Top    Collection Time: 01/10/22 10:57 AM   Result Value Ref Range    Extra Tube hold for add-on    CBC Auto Differential    Collection Time: 01/10/22 10:57 AM    Specimen: Blood   Result Value Ref Range    WBC 9.64 3.40 - 10.80 10*3/mm3    RBC 4.81 3.77 - 5.28 10*6/mm3    Hemoglobin 15.6 12.0 - 15.9 g/dL    Hematocrit 45.7 34.0 - 46.6 %    MCV 95.0 79.0 - 97.0 fL    MCH 32.4 26.6 - 33.0 pg    MCHC 34.1 31.5 - 35.7 g/dL    RDW 13.4 12.3 - 15.4 %    RDW-SD 47.7 37.0 - 54.0 fl    MPV 9.7 6.0 - 12.0 fL    Platelets 209 140 - 450 10*3/mm3     "Neutrophil % 62.2 42.7 - 76.0 %    Lymphocyte % 27.2 19.6 - 45.3 %    Monocyte % 10.1 5.0 - 12.0 %    Eosinophil % 0.1 (L) 0.3 - 6.2 %    Basophil % 0.2 0.0 - 1.5 %    Immature Grans % 0.2 0.0 - 0.5 %    Neutrophils, Absolute 6.00 1.70 - 7.00 10*3/mm3    Lymphocytes, Absolute 2.62 0.70 - 3.10 10*3/mm3    Monocytes, Absolute 0.97 (H) 0.10 - 0.90 10*3/mm3    Eosinophils, Absolute 0.01 0.00 - 0.40 10*3/mm3    Basophils, Absolute 0.02 0.00 - 0.20 10*3/mm3    Immature Grans, Absolute 0.02 0.00 - 0.05 10*3/mm3    nRBC 0.0 0.0 - 0.2 /100 WBC   D-dimer, Quantitative    Collection Time: 01/10/22 10:57 AM    Specimen: Blood   Result Value Ref Range    D-Dimer, Quantitative 1.61 (H) 0.00 - 0.56 MCGFEU/mL     Note: In addition to lab results from this visit, the labs listed above may include labs taken at another facility or during a different encounter within the last 24 hours. Please correlate lab times with ED admission and discharge times for further clarification of the services performed during this visit.    No orders to display     Vitals:    01/10/22 1544   BP: 142/91   BP Location: Left arm   Patient Position: Sitting   Pulse: 97   Resp: 18   Temp: 97.6 °F (36.4 °C)   TempSrc: Oral   SpO2: 95%   Weight: 69.9 kg (154 lb)   Height: 162.6 cm (64\")     Medications - No data to display  ECG/EMG Results (last 24 hours)     ** No results found for the last 24 hours. **        No orders to display                                                MDM  Number of Diagnoses or Management Options  Diagnosis management comments:       I have reviewed all available studies at the bedside with the patient. She has normal troponin and BNP. Her glucose is slightly elevated at 131 and her potassium is slightly low at 3.4. Her LFTs are unremarkable procalcitonin from yesterday was normal 0.05. Her lactic yesterday was slightly elevated at 2.1. Her white blood cell count today is normal rest of her CBC is unremarkable as well but her " D-dimer is elevated 1.61. Urinalysis from yesterday is unremarkable.    Given that patient's persistent hypoxia and positive D-dimer I thought she needed a CTA of the chest and admission to the hospital for IV Decadron and remdesivir. She tells me she absolutely does not want remdesivir. She does not want to be admitted to the hospital she tells me her D-dimer is always elevated and she does not wish a CTA of the chest at this time. Talked about the rationale for doing these test and admission but she would rather go home and just wants treatment for the pruritus. Told her this was not optimal but certainly she is alert and oriented and can make her own decisions. I will have her sign out AGAINST MEDICAL ADVICE but I have emphasized the fact we are always open and she should check her oxygen level at home frequently with pulse oximeter and she should return to the ED for reevaluation if she feels worse in any way for consideration of admission and above outlined plan. She understands the risk involved in this.    I talked her about antibody infusion as well as an outpatient but it 10 days out I do not think she is a candidate at this point. Also talked her about the fact that the antibody against omicron is in quite short supply in the oral medication is not available currently for us.    Patient understands all of this and still does not wish to be admitted to the hospital. I have asked her to follow-up with her PCP. I have given her 1 dose of Decadron here and wrote her for another 6 mg to use tomorrow. And again I have reemphasized she should return for reevaluation if she feels worse and consideration of admission.      Final diagnoses:   None       ED Disposition  ED Disposition     None          No follow-up provider specified.       Medication List      No changes were made to your prescriptions during this visit.          Derrick Sánchez MD  01/10/22 5502

## 2022-01-11 ENCOUNTER — READMISSION MANAGEMENT (OUTPATIENT)
Dept: CALL CENTER | Facility: HOSPITAL | Age: 57
End: 2022-01-11

## 2022-01-11 LAB
BACTERIA SPEC AEROBE CULT: NORMAL
QT INTERVAL: 304 MS
QT INTERVAL: 360 MS
QTC INTERVAL: 411 MS
QTC INTERVAL: 450 MS

## 2022-01-11 NOTE — DISCHARGE INSTRUCTIONS
Continue taking the steroids as prescribed.  Medrol Dosepak has been forwarded to your personal pharmacy to begin tomorrow.  Benadryl 25 mg every 8 hours as needed for rash or you may substitute with hydroxyzine 25 mg every 8 hours which may have a better effect for anxiety as well.  Follow-up with your primary care provider to monitor your recovery.  Thank you

## 2022-01-11 NOTE — ED PROVIDER NOTES
EMERGENCY DEPARTMENT ENCOUNTER    Pt Name: Lisa Limon  MRN: [id number]  Pt :   1965  Room Number:    Date of encounter:  1/10/2022  PCP: Natasha Torres APRN  ED Provider: SUSAN Paz    Historian: patient      HPI:  Chief Complaint: Rash and anxiety about health        Context: Lisa Limon is a 56 y.o. female who presents to the ED with complaint of rash eruption yesterday.  Patient states that she began having symptoms onset  for COVID-19 and tested positive yesterday.  She was seen in our facility yesterday for rash eruption followed by reevaluation early this morning.  Patient has received steroids and has been taking Benadryl..  Patient is acutely anxious about the implications for this COVID-related rash.    Review of systems is negative for fever or chills.  Negative for chest pain positive for cough.  Negative for shortness of breath or exertional dyspnea.  Positive for poor appetite but negative for vomiting or diarrhea.  Negative for abdominal pain.  No neurosensory complaint of focal weakness.  No profound weakness, dizziness or syncope.      This is the patient's third visit in 2 days.  She is taking Benadryl as prescribed.  She is sure she can take and tolerate food and nutrition.    PAST MEDICAL HISTORY  Past Medical History:   Diagnosis Date   • Acid reflux    • Anxiety    • Colon polyps    • Depression    • Gall stones    • Ovarian cyst    • Pancreatitis    • Ulcer    • UTI (urinary tract infection)          PAST SURGICAL HISTORY  Past Surgical History:   Procedure Laterality Date   • CHOLECYSTECTOMY     • HYSTERECTOMY     • TUBAL ABDOMINAL LIGATION           FAMILY HISTORY  Family History   Problem Relation Age of Onset   • Migraines Mother    • Heart attack Father    • Diabetes Paternal Aunt    • Diabetes Paternal Uncle    • No Known Problems Sister    • Stroke Brother    • No Known Problems Maternal Grandmother    • No Known Problems  Maternal Grandfather    • No Known Problems Paternal Grandmother    • No Known Problems Paternal Grandfather    • Diabetes Other          SOCIAL HISTORY  Social History     Socioeconomic History   • Marital status:    Tobacco Use   • Smoking status: Current Every Day Smoker     Packs/day: 0.25     Years: 36.00     Pack years: 9.00     Types: Cigarettes     Start date: 1985   • Smokeless tobacco: Never Used   Substance and Sexual Activity   • Alcohol use: No   • Drug use: Never   • Sexual activity: Defer         ALLERGIES  Iodinated diagnostic agents, Zithromax [azithromycin], Amoxicillin, Hydrocodone-acetaminophen, and Tetanus toxoids        REVIEW OF SYSTEMS  Review of Systems     All systems reviewed and negative except for those discussed in HPI.       PHYSICAL EXAM    I have reviewed the triage vital signs and nursing notes.    ED Triage Vitals [01/10/22 1544]   Temp Heart Rate Resp BP SpO2   97.6 °F (36.4 °C) 97 18 142/91 95 %      Temp src Heart Rate Source Patient Position BP Location FiO2 (%)   Oral Monitor Sitting Left arm --       Physical Exam  GENERAL:   Appears well.  Her vital signs are normal.  She is a good historian but is very anxious and tearful with history collection.  HENT: Nares patent.  Posterior pharynx is benign.  No exudate.  Neck: No lymphadenopathy.  Supple.  EYES: No scleral icterus.  CV: Regular rhythm, regular rate.  No tachycardia.  No peripheral edema  RESPIRATORY: Normal effort.  No audible wheezes, rales or rhonchi.  No angioedema or stridor.  ABDOMEN: Soft, nontender  MUSCULOSKELETAL: No deformities.   NEURO: Alert, moves all extremities, follows commands.  SKIN: Warm, dry, no rash visualized.  Patient has a maculopapular urticaria-like eruption on the lower abdomen and proximal extremities.  She has few similar lesions on the face.  She has no angioedema or stridor.  There are no pustules or petechiae or purpura.        LAB RESULTS  Recent Results (from the past 24  hour(s))   ECG 12 Lead    Collection Time: 01/10/22 10:46 AM   Result Value Ref Range    QT Interval 304 ms    QTC Interval 411 ms   Comprehensive Metabolic Panel    Collection Time: 01/10/22 10:57 AM    Specimen: Blood   Result Value Ref Range    Glucose 131 (H) 65 - 99 mg/dL    BUN 11 6 - 20 mg/dL    Creatinine 0.78 0.57 - 1.00 mg/dL    Sodium 138 136 - 145 mmol/L    Potassium 3.4 (L) 3.5 - 5.2 mmol/L    Chloride 103 98 - 107 mmol/L    CO2 20.0 (L) 22.0 - 29.0 mmol/L    Calcium 9.3 8.6 - 10.5 mg/dL    Total Protein 7.6 6.0 - 8.5 g/dL    Albumin 4.50 3.50 - 5.20 g/dL    ALT (SGPT) 16 1 - 33 U/L    AST (SGOT) 18 1 - 32 U/L    Alkaline Phosphatase 100 39 - 117 U/L    Total Bilirubin 0.7 0.0 - 1.2 mg/dL    eGFR Non African Amer 77 >60 mL/min/1.73    Globulin 3.1 gm/dL    A/G Ratio 1.5 g/dL    BUN/Creatinine Ratio 14.1 7.0 - 25.0    Anion Gap 15.0 5.0 - 15.0 mmol/L   BNP    Collection Time: 01/10/22 10:57 AM    Specimen: Blood   Result Value Ref Range    proBNP 82.5 0.0 - 900.0 pg/mL   Troponin    Collection Time: 01/10/22 10:57 AM    Specimen: Blood   Result Value Ref Range    Troponin T <0.010 0.000 - 0.030 ng/mL   Green Top (Gel)    Collection Time: 01/10/22 10:57 AM   Result Value Ref Range    Extra Tube Hold for add-ons.    Lavender Top    Collection Time: 01/10/22 10:57 AM   Result Value Ref Range    Extra Tube hold for add-on    Gold Top - SST    Collection Time: 01/10/22 10:57 AM   Result Value Ref Range    Extra Tube Hold for add-ons.    Gray Top    Collection Time: 01/10/22 10:57 AM   Result Value Ref Range    Extra Tube Hold for add-ons.    Light Blue Top    Collection Time: 01/10/22 10:57 AM   Result Value Ref Range    Extra Tube hold for add-on    CBC Auto Differential    Collection Time: 01/10/22 10:57 AM    Specimen: Blood   Result Value Ref Range    WBC 9.64 3.40 - 10.80 10*3/mm3    RBC 4.81 3.77 - 5.28 10*6/mm3    Hemoglobin 15.6 12.0 - 15.9 g/dL    Hematocrit 45.7 34.0 - 46.6 %    MCV 95.0 79.0 - 97.0  fL    MCH 32.4 26.6 - 33.0 pg    MCHC 34.1 31.5 - 35.7 g/dL    RDW 13.4 12.3 - 15.4 %    RDW-SD 47.7 37.0 - 54.0 fl    MPV 9.7 6.0 - 12.0 fL    Platelets 209 140 - 450 10*3/mm3    Neutrophil % 62.2 42.7 - 76.0 %    Lymphocyte % 27.2 19.6 - 45.3 %    Monocyte % 10.1 5.0 - 12.0 %    Eosinophil % 0.1 (L) 0.3 - 6.2 %    Basophil % 0.2 0.0 - 1.5 %    Immature Grans % 0.2 0.0 - 0.5 %    Neutrophils, Absolute 6.00 1.70 - 7.00 10*3/mm3    Lymphocytes, Absolute 2.62 0.70 - 3.10 10*3/mm3    Monocytes, Absolute 0.97 (H) 0.10 - 0.90 10*3/mm3    Eosinophils, Absolute 0.01 0.00 - 0.40 10*3/mm3    Basophils, Absolute 0.02 0.00 - 0.20 10*3/mm3    Immature Grans, Absolute 0.02 0.00 - 0.05 10*3/mm3    nRBC 0.0 0.0 - 0.2 /100 WBC   D-dimer, Quantitative    Collection Time: 01/10/22 10:57 AM    Specimen: Blood   Result Value Ref Range    D-Dimer, Quantitative 1.61 (H) 0.00 - 0.56 MCGFEU/mL       If labs were ordered, I independently reviewed the results.        RADIOLOGY  No Radiology Exams Resulted Within Past 24 Hours      PROCEDURES    Procedures    No orders to display       MEDICATIONS GIVEN IN ER    Medications   sodium chloride 0.9 % bolus 1,000 mL (0 mL Intravenous Stopped 1/10/22 2336)   hydrOXYzine (ATARAX) tablet 50 mg (50 mg Oral Given 1/10/22 2208)         ED Course as of 01/11/22 0634   Mon Mikey 10, 2022   2306 Patient has been hydrated.  She is less anxious after administration of hydroxyzine.  It was noted that she was given Ativan on a previous occasion and this caused her oxygen saturations to be lower.  Meanwhile, throughout her ED course today she has had normal vital signs.  Her Wells score and PERC score are low risk.  I have reviewed her work-up earlier today and the labs.  She is feeling much reassured.  She will continue taking her steroid course for the rash.  I will give her prescription for Vistaril for home use.  We discussed parameters for concern that would warrant return to the emergency department.   "Lisa understands and concurs with this outpatient plan of care and close follow-up [MS]   2326 Patient is tearful and scared of her rash; I have tried repeatedly to reassure her re: her plan of care.  Her vitals signs have been normal through her ED course.  She has had no hypotension or tachycardia.  She has been observed in the ED lobby or exam room for almost 8 hours.  A family member has called me to advocate for admission because of Ms. Limon's anxiety.   \"she'll just get worked up and come back'   [MS]      ED Course User Index  [MS] Francisca Thompson, APROKSANA             AS OF 06:34 EST VITALS:    BP - 111/70  HR - 94  TEMP - 97.8 °F (36.6 °C) (Oral)  O2 SATS - 95%                  DIAGNOSIS  Final diagnoses:   Rash associated with COVID-19   Anxiety about health         DISPOSITION    DISCHARGE    Patient discharged in stable condition.    Reviewed implications of results, diagnosis, meds, responsibility to follow up, warning signs and symptoms of possible worsening, potential complications and reasons to return to ER.    Patient/Family voiced understanding of above instructions.    Discussed plan for discharge, as there is no emergent indication for admission.  Pt/family is agreeable and understands need for follow up and possible repeat testing.  Pt/family is aware that discharge does not mean that nothing is wrong but that it indicates no emergency is currently present that requires admission and they must continue care with follow-up as given below or with a physician of their choice.     FOLLOW-UP  Natasha Torres, SUSAN  [address] [facility] [address] [phone]    Schedule an appointment as soon as possible for a visit in 2 days  If symptoms worsen         Medication List      New Prescriptions    hydrOXYzine pamoate 50 MG capsule  Commonly known as: VISTARIL  Take 1 capsule by mouth 3 (Three) Times a Day As Needed for Itching or Anxiety.           Where to Get Your Medications    "   These medications were sent to [facility] [place], [place] - [address] [place] [address] - [phone]  - [phone]   [address] [facility] [address]    Phone: [phone]   · hydrOXYzine pamoate 50 MG capsule                Francisca Thompson, APRN  01/11/22 0634

## 2022-01-11 NOTE — OUTREACH NOTE
COVID-19 Week 1 Survey      Responses   [facility] patient discharged from? [place]   Does the patient have one of the following disease processes/diagnoses(primary or secondary)? COVID-19   COVID-19 underlying condition? None   Call Number Call 1   Week 1 Call successful? Yes   Call start time 0904   Call end time 0907   General alerts for this patient ED discharge - call x 3 only   Discharge diagnosis rash,  Covid 19   Meds reviewed with patient/caregiver? Yes   Does the patient have all medications ordered at discharge? Yes   Is the patient taking all medications as directed (includes completed medication regime)? No   What is preventing the patient from taking all medications as directed? --  [Not picked up yet]   Nursing Interventions Nurse provided patient education   Does the patient have a primary care provider?  Yes   Does the patient have an appointment with their PCP or specialist within 7 days of discharge? No   What is preventing the patient from scheduling follow up appointments within 7 days of discharge? Haven't had time   Nursing Interventions Advised patient to make appointment   Has the patient kept scheduled appointments due by today? N/A   What DME was ordered? Sent home w/ pulse ox   Has all DME been delivered? Yes   Psychosocial issues? No   Did the patient receive a copy of their discharge instructions? Yes   Did the patient receive a copy of COVID-19 specific instructions? Yes   Nursing interventions Reviewed instructions with patient   What is the patient's perception of their health status since discharge? Same   Does the patient have any of the following symptoms? Loss of taste/smell   Nursing Interventions Nurse provided patient education   Pulse Ox monitoring Intermittent   Pulse Ox device source Hospital   O2 Sat comments 95% on RA    Is the patient/caregiver able to teach back steps to recovery at home? Rest and rebuild strength, gradually increase activity,  Eat a  well-balance diet   If the patient is a current smoker, are they able to teach back resources for cessation? Smoking cessation medications  [Trying to quit]   Is the patient/caregiver able to teach back the hierarchy of who to call/visit for symptoms/problems? PCP, Specialist, Home health nurse, Urgent Care, ED, 911 Yes   COVID-19 call completed? Yes          Teetee Nicole RN

## 2022-01-12 ENCOUNTER — APPOINTMENT (OUTPATIENT)
Dept: GENERAL RADIOLOGY | Facility: HOSPITAL | Age: 57
End: 2022-01-12

## 2022-01-12 ENCOUNTER — APPOINTMENT (OUTPATIENT)
Dept: CT IMAGING | Facility: HOSPITAL | Age: 57
End: 2022-01-12

## 2022-01-12 ENCOUNTER — HOSPITAL ENCOUNTER (OUTPATIENT)
Facility: HOSPITAL | Age: 57
Setting detail: OBSERVATION
Discharge: HOME | End: 2022-01-14
Attending: EMERGENCY MEDICINE | Admitting: HOSPITALIST

## 2022-01-12 ENCOUNTER — READMISSION MANAGEMENT (OUTPATIENT)
Dept: CALL CENTER | Facility: HOSPITAL | Age: 57
End: 2022-01-12

## 2022-01-12 DIAGNOSIS — J12.82 PNEUMONIA DUE TO COVID-19 VIRUS: ICD-10-CM

## 2022-01-12 DIAGNOSIS — R53.83 MALAISE AND FATIGUE: ICD-10-CM

## 2022-01-12 DIAGNOSIS — U07.1 PNEUMONIA DUE TO COVID-19 VIRUS: ICD-10-CM

## 2022-01-12 DIAGNOSIS — R79.89 POSITIVE D DIMER: ICD-10-CM

## 2022-01-12 DIAGNOSIS — F41.9 ANXIETY AND DEPRESSION: ICD-10-CM

## 2022-01-12 DIAGNOSIS — R19.7 DIARRHEA, UNSPECIFIED TYPE: ICD-10-CM

## 2022-01-12 DIAGNOSIS — Z72.0 TOBACCO ABUSE: ICD-10-CM

## 2022-01-12 DIAGNOSIS — R07.89 ATYPICAL CHEST PAIN: Primary | ICD-10-CM

## 2022-01-12 DIAGNOSIS — Z87.19 HISTORY OF GASTROESOPHAGEAL REFLUX (GERD): ICD-10-CM

## 2022-01-12 DIAGNOSIS — R06.02 SHORTNESS OF BREATH: ICD-10-CM

## 2022-01-12 DIAGNOSIS — R53.81 MALAISE AND FATIGUE: ICD-10-CM

## 2022-01-12 DIAGNOSIS — F32.A ANXIETY AND DEPRESSION: ICD-10-CM

## 2022-01-12 PROBLEM — K21.9 GERD WITHOUT ESOPHAGITIS: Status: ACTIVE | Noted: 2022-01-12

## 2022-01-12 PROBLEM — R07.9 CHEST PAIN: Status: ACTIVE | Noted: 2022-01-12

## 2022-01-12 LAB
ALBUMIN SERPL-MCNC: 4.5 G/DL (ref 3.5–5.2)
ALBUMIN/GLOB SERPL: 1.5 G/DL
ALP SERPL-CCNC: 114 U/L (ref 39–117)
ALT SERPL W P-5'-P-CCNC: 18 U/L (ref 1–33)
ANION GAP SERPL CALCULATED.3IONS-SCNC: 13 MMOL/L (ref 5–15)
AST SERPL-CCNC: 17 U/L (ref 1–32)
BASOPHILS # BLD AUTO: 0.02 10*3/MM3 (ref 0–0.2)
BASOPHILS NFR BLD AUTO: 0.2 % (ref 0–1.5)
BILIRUB SERPL-MCNC: 0.7 MG/DL (ref 0–1.2)
BUN SERPL-MCNC: 11 MG/DL (ref 6–20)
BUN/CREAT SERPL: 14.3 (ref 7–25)
CALCIUM SPEC-SCNC: 9.5 MG/DL (ref 8.6–10.5)
CHLORIDE SERPL-SCNC: 103 MMOL/L (ref 98–107)
CO2 SERPL-SCNC: 24 MMOL/L (ref 22–29)
CREAT SERPL-MCNC: 0.77 MG/DL (ref 0.57–1)
CRP SERPL-MCNC: 0.36 MG/DL (ref 0–0.5)
D-LACTATE SERPL-SCNC: 1.3 MMOL/L (ref 0.5–2)
DEPRECATED RDW RBC AUTO: 46.6 FL (ref 37–54)
EOSINOPHIL # BLD AUTO: 0.02 10*3/MM3 (ref 0–0.4)
EOSINOPHIL NFR BLD AUTO: 0.2 % (ref 0.3–6.2)
ERYTHROCYTE [DISTWIDTH] IN BLOOD BY AUTOMATED COUNT: 13.2 % (ref 12.3–15.4)
GFR SERPL CREATININE-BSD FRML MDRD: 78 ML/MIN/1.73
GLOBULIN UR ELPH-MCNC: 3.1 GM/DL
GLUCOSE SERPL-MCNC: 97 MG/DL (ref 65–99)
HCT VFR BLD AUTO: 45.9 % (ref 34–46.6)
HGB BLD-MCNC: 15.8 G/DL (ref 12–15.9)
HOLD SPECIMEN: NORMAL
IMM GRANULOCYTES # BLD AUTO: 0.08 10*3/MM3 (ref 0–0.05)
IMM GRANULOCYTES NFR BLD AUTO: 0.8 % (ref 0–0.5)
LDH SERPL-CCNC: 216 U/L (ref 135–214)
LIPASE SERPL-CCNC: 38 U/L (ref 13–60)
LYMPHOCYTES # BLD AUTO: 3.36 10*3/MM3 (ref 0.7–3.1)
LYMPHOCYTES NFR BLD AUTO: 34.7 % (ref 19.6–45.3)
MCH RBC QN AUTO: 32.8 PG (ref 26.6–33)
MCHC RBC AUTO-ENTMCNC: 34.4 G/DL (ref 31.5–35.7)
MCV RBC AUTO: 95.2 FL (ref 79–97)
MONOCYTES # BLD AUTO: 0.77 10*3/MM3 (ref 0.1–0.9)
MONOCYTES NFR BLD AUTO: 8 % (ref 5–12)
NEUTROPHILS NFR BLD AUTO: 5.42 10*3/MM3 (ref 1.7–7)
NEUTROPHILS NFR BLD AUTO: 56.1 % (ref 42.7–76)
NRBC BLD AUTO-RTO: 0 /100 WBC (ref 0–0.2)
NT-PROBNP SERPL-MCNC: 93.2 PG/ML (ref 0–900)
PLATELET # BLD AUTO: 272 10*3/MM3 (ref 140–450)
PMV BLD AUTO: 9.3 FL (ref 6–12)
POTASSIUM SERPL-SCNC: 3.7 MMOL/L (ref 3.5–5.2)
PROCALCITONIN SERPL-MCNC: 0.05 NG/ML (ref 0–0.25)
PROT SERPL-MCNC: 7.6 G/DL (ref 6–8.5)
RBC # BLD AUTO: 4.82 10*6/MM3 (ref 3.77–5.28)
SODIUM SERPL-SCNC: 140 MMOL/L (ref 136–145)
TROPONIN T SERPL-MCNC: <0.01 NG/ML (ref 0–0.03)
TROPONIN T SERPL-MCNC: <0.01 NG/ML (ref 0–0.03)
WBC NRBC COR # BLD: 9.67 10*3/MM3 (ref 3.4–10.8)
WHOLE BLOOD HOLD SPECIMEN: NORMAL
WHOLE BLOOD HOLD SPECIMEN: NORMAL

## 2022-01-12 PROCEDURE — 96372 THER/PROPH/DIAG INJ SC/IM: CPT

## 2022-01-12 PROCEDURE — 25010000002 DIPHENHYDRAMINE PER 50 MG: Performed by: PHYSICIAN ASSISTANT

## 2022-01-12 PROCEDURE — 25010000002 ENOXAPARIN PER 10 MG: Performed by: PHYSICIAN ASSISTANT

## 2022-01-12 PROCEDURE — 99284 EMERGENCY DEPT VISIT MOD MDM: CPT

## 2022-01-12 PROCEDURE — 71045 X-RAY EXAM CHEST 1 VIEW: CPT

## 2022-01-12 PROCEDURE — 85025 COMPLETE CBC W/AUTO DIFF WBC: CPT

## 2022-01-12 PROCEDURE — 25010000002 DEXAMETHASONE PER 1 MG: Performed by: PHYSICIAN ASSISTANT

## 2022-01-12 PROCEDURE — 25010000002 METOCLOPRAMIDE PER 10 MG: Performed by: PHYSICIAN ASSISTANT

## 2022-01-12 PROCEDURE — 80053 COMPREHEN METABOLIC PANEL: CPT

## 2022-01-12 PROCEDURE — 84145 PROCALCITONIN (PCT): CPT | Performed by: PHYSICIAN ASSISTANT

## 2022-01-12 PROCEDURE — 96375 TX/PRO/DX INJ NEW DRUG ADDON: CPT

## 2022-01-12 PROCEDURE — 96374 THER/PROPH/DIAG INJ IV PUSH: CPT

## 2022-01-12 PROCEDURE — 84484 ASSAY OF TROPONIN QUANT: CPT

## 2022-01-12 PROCEDURE — 84484 ASSAY OF TROPONIN QUANT: CPT | Performed by: EMERGENCY MEDICINE

## 2022-01-12 PROCEDURE — 86140 C-REACTIVE PROTEIN: CPT | Performed by: PHYSICIAN ASSISTANT

## 2022-01-12 PROCEDURE — 71250 CT THORAX DX C-: CPT

## 2022-01-12 PROCEDURE — 83690 ASSAY OF LIPASE: CPT

## 2022-01-12 PROCEDURE — 99220 PR INITIAL OBSERVATION CARE/DAY 70 MINUTES: CPT | Performed by: INTERNAL MEDICINE

## 2022-01-12 PROCEDURE — 93005 ELECTROCARDIOGRAM TRACING: CPT | Performed by: EMERGENCY MEDICINE

## 2022-01-12 PROCEDURE — 83615 LACTATE (LD) (LDH) ENZYME: CPT | Performed by: PHYSICIAN ASSISTANT

## 2022-01-12 PROCEDURE — 93005 ELECTROCARDIOGRAM TRACING: CPT

## 2022-01-12 PROCEDURE — 83880 ASSAY OF NATRIURETIC PEPTIDE: CPT

## 2022-01-12 PROCEDURE — 83605 ASSAY OF LACTIC ACID: CPT | Performed by: PHYSICIAN ASSISTANT

## 2022-01-12 RX ORDER — DEXAMETHASONE SODIUM PHOSPHATE 4 MG/ML
8 INJECTION, SOLUTION INTRA-ARTICULAR; INTRALESIONAL; INTRAMUSCULAR; INTRAVENOUS; SOFT TISSUE ONCE
Status: COMPLETED | OUTPATIENT
Start: 2022-01-12 | End: 2022-01-12

## 2022-01-12 RX ORDER — ACETAMINOPHEN 500 MG
1000 TABLET ORAL ONCE
Status: COMPLETED | OUTPATIENT
Start: 2022-01-12 | End: 2022-01-12

## 2022-01-12 RX ORDER — SODIUM CHLORIDE 0.9 % (FLUSH) 0.9 %
10 SYRINGE (ML) INJECTION AS NEEDED
Status: DISCONTINUED | OUTPATIENT
Start: 2022-01-12 | End: 2022-01-14 | Stop reason: HOSPADM

## 2022-01-12 RX ORDER — BENZONATATE 100 MG/1
100 CAPSULE ORAL 3 TIMES DAILY PRN
Status: DISCONTINUED | OUTPATIENT
Start: 2022-01-12 | End: 2022-01-14 | Stop reason: HOSPADM

## 2022-01-12 RX ORDER — DIPHENHYDRAMINE HYDROCHLORIDE 50 MG/ML
25 INJECTION INTRAMUSCULAR; INTRAVENOUS ONCE
Status: COMPLETED | OUTPATIENT
Start: 2022-01-12 | End: 2022-01-12

## 2022-01-12 RX ORDER — SODIUM CHLORIDE 0.9 % (FLUSH) 0.9 %
10 SYRINGE (ML) INJECTION EVERY 12 HOURS SCHEDULED
Status: DISCONTINUED | OUTPATIENT
Start: 2022-01-12 | End: 2022-01-14 | Stop reason: HOSPADM

## 2022-01-12 RX ORDER — METOCLOPRAMIDE HYDROCHLORIDE 5 MG/ML
10 INJECTION INTRAMUSCULAR; INTRAVENOUS ONCE
Status: COMPLETED | OUTPATIENT
Start: 2022-01-12 | End: 2022-01-12

## 2022-01-12 RX ORDER — CHOLECALCIFEROL (VITAMIN D3) 125 MCG
5 CAPSULE ORAL NIGHTLY PRN
Status: DISCONTINUED | OUTPATIENT
Start: 2022-01-12 | End: 2022-01-14 | Stop reason: HOSPADM

## 2022-01-12 RX ORDER — ALBUTEROL SULFATE 90 UG/1
2 AEROSOL, METERED RESPIRATORY (INHALATION)
Status: DISCONTINUED | OUTPATIENT
Start: 2022-01-12 | End: 2022-01-14 | Stop reason: HOSPADM

## 2022-01-12 RX ORDER — ONDANSETRON 2 MG/ML
4 INJECTION INTRAMUSCULAR; INTRAVENOUS EVERY 6 HOURS PRN
Status: DISCONTINUED | OUTPATIENT
Start: 2022-01-12 | End: 2022-01-14 | Stop reason: HOSPADM

## 2022-01-12 RX ORDER — ACETAMINOPHEN 325 MG/1
650 TABLET ORAL EVERY 4 HOURS PRN
Status: DISCONTINUED | OUTPATIENT
Start: 2022-01-12 | End: 2022-01-14 | Stop reason: HOSPADM

## 2022-01-12 RX ORDER — HYDROXYZINE HYDROCHLORIDE 25 MG/1
25 TABLET, FILM COATED ORAL 3 TIMES DAILY PRN
Status: DISCONTINUED | OUTPATIENT
Start: 2022-01-12 | End: 2022-01-14 | Stop reason: HOSPADM

## 2022-01-12 RX ORDER — ASPIRIN 81 MG/1
324 TABLET, CHEWABLE ORAL ONCE
Status: COMPLETED | OUTPATIENT
Start: 2022-01-12 | End: 2022-01-12

## 2022-01-12 RX ADMIN — DEXAMETHASONE SODIUM PHOSPHATE 8 MG: 4 INJECTION, SOLUTION INTRAMUSCULAR; INTRAVENOUS at 19:30

## 2022-01-12 RX ADMIN — ACETAMINOPHEN 1000 MG: 500 TABLET ORAL at 20:39

## 2022-01-12 RX ADMIN — ENOXAPARIN SODIUM 70 MG: 80 INJECTION SUBCUTANEOUS at 23:31

## 2022-01-12 RX ADMIN — DIPHENHYDRAMINE HYDROCHLORIDE 25 MG: 50 INJECTION INTRAMUSCULAR; INTRAVENOUS at 21:56

## 2022-01-12 RX ADMIN — METOCLOPRAMIDE 10 MG: 5 INJECTION, SOLUTION INTRAMUSCULAR; INTRAVENOUS at 21:56

## 2022-01-12 RX ADMIN — ASPIRIN 324 MG: 81 TABLET, CHEWABLE ORAL at 19:22

## 2022-01-12 RX ADMIN — SODIUM CHLORIDE 1000 ML: 9 INJECTION, SOLUTION INTRAVENOUS at 19:30

## 2022-01-12 NOTE — OUTREACH NOTE
"COVID-19 Week 1 Survey      Responses   [facility] patient discharged from? [place]   Does the patient have one of the following disease processes/diagnoses(primary or secondary)? COVID-19   COVID-19 underlying condition? None   Call Number Call 2   Week 1 Call successful? Yes   Call start time 0921   Call end time 0928   Discharge diagnosis rash,  Covid 19   Is the patient taking all medications as directed (includes completed medication regime)? Yes   What is the patient's perception of their health status since discharge? New symptoms unrelated to diagnosis  [really, really anxious - I just don't feel good, I don't feel right- head \"just feels weird\" pt reports]   Does the patient have any of the following symptoms? Loss of taste/smell   Pulse Ox monitoring Intermittent   Pulse Ox device source Hospital   O2 Sat comments 94% RA    O2 Sat: education provided Sat levels,  Monitoring frequency,  When to seek care   COVID-19 call completed? Yes   Wrap up additional comments Pt feeling and anxious and reports it is hard to explain but her head feels weird-encouraged pt to call her PCP           Gini Serna RN  "

## 2022-01-13 ENCOUNTER — APPOINTMENT (OUTPATIENT)
Dept: NUCLEAR MEDICINE | Facility: HOSPITAL | Age: 57
End: 2022-01-13

## 2022-01-13 ENCOUNTER — READMISSION MANAGEMENT (OUTPATIENT)
Dept: CALL CENTER | Facility: HOSPITAL | Age: 57
End: 2022-01-13

## 2022-01-13 LAB
ANION GAP SERPL CALCULATED.3IONS-SCNC: 13 MMOL/L (ref 5–15)
BUN SERPL-MCNC: 12 MG/DL (ref 6–20)
BUN/CREAT SERPL: 19.4 (ref 7–25)
CALCIUM SPEC-SCNC: 8.7 MG/DL (ref 8.6–10.5)
CHLORIDE SERPL-SCNC: 107 MMOL/L (ref 98–107)
CO2 SERPL-SCNC: 20 MMOL/L (ref 22–29)
CREAT SERPL-MCNC: 0.62 MG/DL (ref 0.57–1)
DEPRECATED RDW RBC AUTO: 46.4 FL (ref 37–54)
ERYTHROCYTE [DISTWIDTH] IN BLOOD BY AUTOMATED COUNT: 13.3 % (ref 12.3–15.4)
GFR SERPL CREATININE-BSD FRML MDRD: 100 ML/MIN/1.73
GLUCOSE BLDC GLUCOMTR-MCNC: 115 MG/DL (ref 70–130)
GLUCOSE SERPL-MCNC: 177 MG/DL (ref 65–99)
HCT VFR BLD AUTO: 40.3 % (ref 34–46.6)
HGB BLD-MCNC: 13.7 G/DL (ref 12–15.9)
MCH RBC QN AUTO: 32.2 PG (ref 26.6–33)
MCHC RBC AUTO-ENTMCNC: 34 G/DL (ref 31.5–35.7)
MCV RBC AUTO: 94.6 FL (ref 79–97)
PLATELET # BLD AUTO: 261 10*3/MM3 (ref 140–450)
PMV BLD AUTO: 9.2 FL (ref 6–12)
POTASSIUM SERPL-SCNC: 4 MMOL/L (ref 3.5–5.2)
RBC # BLD AUTO: 4.26 10*6/MM3 (ref 3.77–5.28)
SODIUM SERPL-SCNC: 140 MMOL/L (ref 136–145)
WBC NRBC COR # BLD: 7.16 10*3/MM3 (ref 3.4–10.8)

## 2022-01-13 PROCEDURE — 0 TECHNETIUM ALBUMIN AGGREGATED: Performed by: HOSPITALIST

## 2022-01-13 PROCEDURE — 80048 BASIC METABOLIC PNL TOTAL CA: CPT | Performed by: PHYSICIAN ASSISTANT

## 2022-01-13 PROCEDURE — G0378 HOSPITAL OBSERVATION PER HR: HCPCS

## 2022-01-13 PROCEDURE — 96372 THER/PROPH/DIAG INJ SC/IM: CPT

## 2022-01-13 PROCEDURE — 82962 GLUCOSE BLOOD TEST: CPT

## 2022-01-13 PROCEDURE — A9540 TC99M MAA: HCPCS | Performed by: HOSPITALIST

## 2022-01-13 PROCEDURE — 78582 LUNG VENTILAT&PERFUS IMAGING: CPT

## 2022-01-13 PROCEDURE — 99226 PR SBSQ OBSERVATION CARE/DAY 35 MINUTES: CPT | Performed by: HOSPITALIST

## 2022-01-13 PROCEDURE — 63710000001 DEXAMETHASONE PER 0.25 MG: Performed by: HOSPITALIST

## 2022-01-13 PROCEDURE — 85027 COMPLETE CBC AUTOMATED: CPT | Performed by: PHYSICIAN ASSISTANT

## 2022-01-13 PROCEDURE — 25010000002 ENOXAPARIN PER 10 MG: Performed by: PHYSICIAN ASSISTANT

## 2022-01-13 RX ADMIN — ENOXAPARIN SODIUM 70 MG: 80 INJECTION SUBCUTANEOUS at 22:00

## 2022-01-13 RX ADMIN — HYDROXYZINE HYDROCHLORIDE 25 MG: 25 TABLET, FILM COATED ORAL at 18:52

## 2022-01-13 RX ADMIN — KIT FOR THE PREPARATION OF TECHNETIUM TC 99M ALBUMIN AGGREGATED 1 DOSE: 2.5 INJECTION, POWDER, FOR SOLUTION INTRAVENOUS at 12:15

## 2022-01-13 RX ADMIN — SODIUM CHLORIDE, PRESERVATIVE FREE 10 ML: 5 INJECTION INTRAVENOUS at 22:00

## 2022-01-13 RX ADMIN — ENOXAPARIN SODIUM 70 MG: 80 INJECTION SUBCUTANEOUS at 11:46

## 2022-01-13 RX ADMIN — DEXAMETHASONE 6 MG: 2 TABLET ORAL at 15:02

## 2022-01-13 RX ADMIN — SODIUM CHLORIDE, PRESERVATIVE FREE 10 ML: 5 INJECTION INTRAVENOUS at 08:55

## 2022-01-13 NOTE — CASE MANAGEMENT/SOCIAL WORK
Discharge Planning Assessment  [facility]     Patient Name: Lisa Limon  MRN: [id number]  Today's Date: 1/13/2022    Admit Date: 1/12/2022     Discharge Needs Assessment     Row Name 01/13/22 1428       Living Environment    Lives With spouse    Current Living Arrangements home/apartment/condo    Primary Care Provided by self    Provides Primary Care For no one    Family Caregiver if Needed spouse    Quality of Family Relationships unable to assess    Able to Return to Prior Arrangements yes       Resource/Environmental Concerns    Resource/Environmental Concerns none       Transition Planning    Patient/Family Anticipates Transition to home with family    Patient/Family Anticipated Services at Transition     Transportation Anticipated family or friend will provide       Discharge Needs Assessment    Readmission Within the Last 30 Days no previous admission in last 30 days    Equipment Currently Used at Home cpap    Concerns to be Addressed discharge planning    Anticipated Changes Related to Illness none               Discharge Plan     Row Name 01/13/22 1429       Plan    Plan Home    Patient/Family in Agreement with Plan yes    Plan Comments Spoke with patient's  by phone to initiate discharge planning.  Patient in isolation.  She lives with him in [facility].  She is independent with ADL's. She has a CPAP at home and is not current with home health.  Her PCP is Kimberly Torres.  She does not have an advanced directive.  Ms. Limon has RX coverage and has her scripts filled at [facility].  Her plan is to return hoem with her  at discharge.   denies any needs at this time.  CM will continue to follow.  Zion Manrique RN [phone]    Final Discharge Disposition Code 01 - home or self-care              Continued Care and Services - Admitted Since 1/12/2022    Coordination has not been started for this encounter.       Expected Discharge Date and Time     Expected Discharge  Date Expected Discharge Time    Mikey 15, 2022          Demographic Summary     Row Name 01/13/22 1427       General Information    Admission Type observation    Arrived From emergency department    Referral Source admission list    Reason for Consult discharge planning    Preferred Language English     Used During This Interaction no               Functional Status     Row Name 01/13/22 1428       Functional Status    Usual Activity Tolerance good    Current Activity Tolerance good       Functional Status, IADL    Medications independent    Meal Preparation independent    Housekeeping independent    Laundry independent    Shopping independent               Psychosocial    No documentation.                Abuse/Neglect    No documentation.                Legal    No documentation.                Substance Abuse    No documentation.                Patient Forms    No documentation.                   Lisa Manrique RN

## 2022-01-13 NOTE — PLAN OF CARE
"Goal Outcome Evaluation:      Pt admitted around 0300 from ED positive for COVID. Currently on RA with sats >95%. NSR, negative troponin. Pt complained of \"itchy rash that disappears sometimes\" on legs and arms. No rash noted upon assessment. Afebrile, VSS, encouraging pulmonary toileting and activity as tolerated. Will continue to monitor pt progress.         "

## 2022-01-13 NOTE — ED PROVIDER NOTES
Subjective   This is a 56-year-old female that presents the ER with chest pain since this morning.  Patient says chest pain is substernal and described as pressure with tightness with intermittent sharp pains without radiation.  She reports some shortness of breath.  Patient says that she has not been feeling well since 12/30/2021.  Patient tested positive for COVID-19 on 1/9/2021.  This is her fourth ER visit to our facility since 1/9/2021.  She had 2 visits in our ER on 1/10/2021 and on the first visit, she was advised to have CT angiogram of the chest with contrast due to positive D-dimer.  Patient reported that her D-dimer was chronically positive and she said that she could not have CT angiogram of the chest due to anaphylactic reaction to IV contrast.  Patient also had a pruritic rash associated with COVID-19, which she was quite concerned about during the ER course.  She was prescribed oral Decadron and hydroxyzine for itching.  She advised the ER physician that she needed to go out into the parking lot and talk with her  about plan of care and she ultimately left AGAINST MEDICAL ADVICE.  Patient does report tobacco abuse and smokes less than 1 pack of cigarettes per day.  She denies any personal history of asthma or COPD.  Past medical history is significant for GERD, peptic ulcer disease, pancreatitis, anxiety/depression, and colon polyps.  Patient presents today with increased chest pressure.  She denies any fever.  She reports loss of taste and smell as well as malaise/fatigue.  She denies any nausea/vomiting.  She does report some diarrhea with 2-3 loose stools today.      History provided by:  Patient  Chest Pain  Pain location:  Substernal area  Pain quality: pressure, sharp and tightness    Pain radiates to:  Does not radiate  Duration: this morning.  Timing:  Constant  Chronicity:  New  Context: breathing    Context comment:  Patient reports diagnosis of COVID-19 on 1/9/2021.  She reports  substernal chest pain without radiation as well as shortness of breath.  She reports tobacco abuse.  No fever.  Cough is productive with green sputum.  Relieved by:  Nothing  Worsened by:  Coughing and deep breathing  Ineffective treatments: Recent Rx for hydroxyzine for rash related to COVID-19 secondary to itching, as well as recent short course of Decadron.  Associated symptoms: anxiety, cough (Productive with thick green sputum), fatigue, palpitations, shortness of breath and weakness (Generalized weakness)    Associated symptoms: no abdominal pain, no back pain, no claudication, no diaphoresis, no dizziness, no fever, no headache, no heartburn, no lower extremity edema, no nausea, no numbness, no orthopnea, no syncope and no vomiting    Risk factors: smoking    Risk factors: no coronary artery disease, no high cholesterol and no hypertension        Review of Systems   Constitutional: Positive for activity change, appetite change and fatigue. Negative for chills, diaphoresis and fever.   HENT: Positive for postnasal drip. Negative for congestion, rhinorrhea, sinus pressure, sinus pain, sneezing and sore throat.         Positive loss of taste and smell   Respiratory: Positive for cough (Productive with thick green sputum) and shortness of breath. Negative for wheezing.         Patient smokes less than 1 pack of cigarettes per day.  No known history of asthma or COPD.   Cardiovascular: Positive for chest pain, palpitations and leg swelling (Trace pedal edema to both lower extremities.). Negative for orthopnea, claudication and syncope.   Gastrointestinal: Positive for diarrhea (2-3 loose stools today.). Negative for abdominal pain, constipation, heartburn, nausea and vomiting.   Genitourinary: Negative.  Negative for flank pain, frequency and urgency.   Musculoskeletal: Negative.  Negative for back pain.   Neurological: Positive for weakness (Generalized weakness). Negative for dizziness, numbness and headaches.  "  All other systems reviewed and are negative.      Past Medical History:   Diagnosis Date   • Acid reflux    • Anxiety    • Colon polyps    • Depression    • Gall stones    • Ovarian cyst    • Pancreatitis    • Ulcer    • UTI (urinary tract infection)        Allergies   Allergen Reactions   • Iodinated Diagnostic Agents Anaphylaxis   • Zithromax [Azithromycin] Other (See Comments)     \"Had to go to ER with Chest Pain\"   • Amoxicillin Rash and Other (See Comments)     Rash and yeast infection   • Hydrocodone-Acetaminophen Rash   • Tetanus Toxoids Rash and GI Intolerance       Past Surgical History:   Procedure Laterality Date   • CHOLECYSTECTOMY  2007   • HYSTERECTOMY  2010   • TUBAL ABDOMINAL LIGATION  1994       Family History   Problem Relation Age of Onset   • Migraines Mother    • Heart attack Father    • Diabetes Paternal Aunt    • Diabetes Paternal Uncle    • No Known Problems Sister    • Stroke Brother    • No Known Problems Maternal Grandmother    • No Known Problems Maternal Grandfather    • No Known Problems Paternal Grandmother    • No Known Problems Paternal Grandfather    • Diabetes Other        Social History     Socioeconomic History   • Marital status:    Tobacco Use   • Smoking status: Current Every Day Smoker     Packs/day: 0.25     Years: 36.00     Pack years: 9.00     Types: Cigarettes     Start date: 1985   • Smokeless tobacco: Never Used   Substance and Sexual Activity   • Alcohol use: No   • Drug use: Never   • Sexual activity: Defer           Objective   Physical Exam  Vitals and nursing note reviewed.   Constitutional:       Appearance: Normal appearance.   HENT:      Head: Normocephalic and atraumatic.      Right Ear: Tympanic membrane normal.      Left Ear: Tympanic membrane normal.      Nose: Nose normal. No congestion or rhinorrhea.      Right Sinus: No maxillary sinus tenderness or frontal sinus tenderness.      Left Sinus: No maxillary sinus tenderness or frontal sinus " tenderness.      Comments: No nasal congestion or rhinorrhea.  No frontal or maxillary sinus tenderness.     Mouth/Throat:      Mouth: Mucous membranes are dry.      Pharynx: Oropharynx is clear. No oropharyngeal exudate or posterior oropharyngeal erythema.      Comments: Oral mucous membranes appear slightly dry.  Posterior pharynx is not erythematous.  Eyes:      Extraocular Movements: Extraocular movements intact.      Conjunctiva/sclera: Conjunctivae normal.      Pupils: Pupils are equal, round, and reactive to light.   Cardiovascular:      Rate and Rhythm: Normal rate and regular rhythm.  No extrasystoles are present.     Pulses: Normal pulses.           Posterior tibial pulses are 2+ on the right side and 2+ on the left side.      Heart sounds: Normal heart sounds.      Comments: Regular rate and rhythm.  No tachycardia or ectopy.  Trace nonpitting edema to bilateral ankles and feet.  No erythema or warmth.  Pulmonary:      Effort: Pulmonary effort is normal. No tachypnea or accessory muscle usage.      Breath sounds: Normal breath sounds. No decreased air movement or transmitted upper airway sounds. No decreased breath sounds, wheezing or rhonchi.      Comments: No cough noted on exam.  No tachypnea or retractions.  Lungs are clear to auscultation bilaterally.  No wheezes or rhonchi or decreased breath sounds concerning for consolidation.  Abdominal:      General: Bowel sounds are normal. There is no distension.      Palpations: Abdomen is soft.      Tenderness: There is no abdominal tenderness. There is no right CVA tenderness, left CVA tenderness, guarding or rebound.      Comments: Abdomen soft and nontender.  No flank or CVA tenderness.   Musculoskeletal:         General: Normal range of motion.      Cervical back: Normal range of motion and neck supple.      Right lower leg: Edema present.      Left lower leg: Edema present.   Skin:     General: Skin is warm and dry.   Neurological:      General: No  focal deficit present.      Mental Status: She is alert.         Procedures           ED Course  ED Course as of 01/12/22 2208 Wed Jan 12, 2022 1944 EKGs x2 show normal sinus rhythm.  No evidence of acute ST-T wave changes consistent with ischemia.  CBC and chemistries were within normal limits.  Initial troponin is less than 0.010.  Lipase is 38 and BNP is 93.  Chest x-ray reveals no acute cardiopulmonary process.  O2 sat is anywhere from 94 to 96% on room air.  Temp is 97.8.  Heart rate is in the 80s.  Patient is resting comfortably.  She was given IV fluid bolus secondary to increased loose stools and patient was also given IV Decadron 8 mg. [FC]   2138 Repeat 2-hour troponin is less than 0.010.  All inflammatory markers are essentially normal except for LDH of 216 which is just slightly up.  CT of the chest without contrast reveals minimal groundglass infiltrate in the medial perihilar region of the right upper lobe and right middle lobe which could suggest early developing pneumonia, COVID-19 reporting criteria.  Indeterminate imaging features can be seen with COVID-19 pneumonia.  O2 sats have been stable throughout the ER course.  I will discussed the case with Dr. Meeks, ER attending physician. [FC]   2139 Discussed the case with Dr. Meeks, ER attending physician.  Patient's D-dimer was elevated 2 days ago on 1/10/2021 at 1.61.  Since she has anaphylactic reaction to IV contrast, we think it is reasonable to do cardiac rule out per MI protocol as well as VQ scan.  I will page hospitalist, Dr. Figueredo, to discuss the case. [FC]   2153 After reviewing epic, patient had a normal stress test on 2/28/2019 by Dr. Pace.  EF was 70% and impression revealed low risk study. [FC]   2205 Discussed the case with Dr. Figueredo, hospitalist.  She is agreeable to admission on telemetry.  We will initiate Lovenox 1 mg/kg subcutaneous x1 dose here in the ER.  I will update the patient on all results and need for  observational admission. [FC]      ED Course User Index  [FC] Staci Hines PA-C           Recent Results (from the past 24 hour(s))   Gold Top - SST    Collection Time: 01/12/22  2:55 PM   Result Value Ref Range    Extra Tube Hold for add-ons.    Light Blue Top    Collection Time: 01/12/22  2:55 PM   Result Value Ref Range    Extra Tube hold for add-on    Troponin    Collection Time: 01/12/22  2:56 PM    Specimen: Blood   Result Value Ref Range    Troponin T <0.010 0.000 - 0.030 ng/mL   Comprehensive Metabolic Panel    Collection Time: 01/12/22  2:56 PM    Specimen: Blood   Result Value Ref Range    Glucose 97 65 - 99 mg/dL    BUN 11 6 - 20 mg/dL    Creatinine 0.77 0.57 - 1.00 mg/dL    Sodium 140 136 - 145 mmol/L    Potassium 3.7 3.5 - 5.2 mmol/L    Chloride 103 98 - 107 mmol/L    CO2 24.0 22.0 - 29.0 mmol/L    Calcium 9.5 8.6 - 10.5 mg/dL    Total Protein 7.6 6.0 - 8.5 g/dL    Albumin 4.50 3.50 - 5.20 g/dL    ALT (SGPT) 18 1 - 33 U/L    AST (SGOT) 17 1 - 32 U/L    Alkaline Phosphatase 114 39 - 117 U/L    Total Bilirubin 0.7 0.0 - 1.2 mg/dL    eGFR Non African Amer 78 >60 mL/min/1.73    Globulin 3.1 gm/dL    A/G Ratio 1.5 g/dL    BUN/Creatinine Ratio 14.3 7.0 - 25.0    Anion Gap 13.0 5.0 - 15.0 mmol/L   Lipase    Collection Time: 01/12/22  2:56 PM    Specimen: Blood   Result Value Ref Range    Lipase 38 13 - 60 U/L   BNP    Collection Time: 01/12/22  2:56 PM    Specimen: Blood   Result Value Ref Range    proBNP 93.2 0.0 - 900.0 pg/mL   Green Top (Gel)    Collection Time: 01/12/22  2:56 PM   Result Value Ref Range    Extra Tube Hold for add-ons.    Lavender Top    Collection Time: 01/12/22  2:56 PM   Result Value Ref Range    Extra Tube hold for add-on    Gray Top    Collection Time: 01/12/22  2:56 PM   Result Value Ref Range    Extra Tube Hold for add-ons.    CBC Auto Differential    Collection Time: 01/12/22  2:56 PM    Specimen: Blood   Result Value Ref Range    WBC 9.67 3.40 - 10.80 10*3/mm3    RBC 4.82 3.77 -  5.28 10*6/mm3    Hemoglobin 15.8 12.0 - 15.9 g/dL    Hematocrit 45.9 34.0 - 46.6 %    MCV 95.2 79.0 - 97.0 fL    MCH 32.8 26.6 - 33.0 pg    MCHC 34.4 31.5 - 35.7 g/dL    RDW 13.2 12.3 - 15.4 %    RDW-SD 46.6 37.0 - 54.0 fl    MPV 9.3 6.0 - 12.0 fL    Platelets 272 140 - 450 10*3/mm3    Neutrophil % 56.1 42.7 - 76.0 %    Lymphocyte % 34.7 19.6 - 45.3 %    Monocyte % 8.0 5.0 - 12.0 %    Eosinophil % 0.2 (L) 0.3 - 6.2 %    Basophil % 0.2 0.0 - 1.5 %    Immature Grans % 0.8 (H) 0.0 - 0.5 %    Neutrophils, Absolute 5.42 1.70 - 7.00 10*3/mm3    Lymphocytes, Absolute 3.36 (H) 0.70 - 3.10 10*3/mm3    Monocytes, Absolute 0.77 0.10 - 0.90 10*3/mm3    Eosinophils, Absolute 0.02 0.00 - 0.40 10*3/mm3    Basophils, Absolute 0.02 0.00 - 0.20 10*3/mm3    Immature Grans, Absolute 0.08 (H) 0.00 - 0.05 10*3/mm3    nRBC 0.0 0.0 - 0.2 /100 WBC   Procalcitonin    Collection Time: 01/12/22  2:56 PM    Specimen: Blood   Result Value Ref Range    Procalcitonin 0.05 0.00 - 0.25 ng/mL   Lactate Dehydrogenase    Collection Time: 01/12/22  2:56 PM    Specimen: Blood   Result Value Ref Range     (H) 135 - 214 U/L   Lactic Acid, Plasma    Collection Time: 01/12/22  2:56 PM    Specimen: Blood   Result Value Ref Range    Lactate 1.3 0.5 - 2.0 mmol/L   C-reactive Protein    Collection Time: 01/12/22  2:56 PM    Specimen: Blood   Result Value Ref Range    C-Reactive Protein 0.36 0.00 - 0.50 mg/dL   Troponin    Collection Time: 01/12/22  8:47 PM    Specimen: Blood   Result Value Ref Range    Troponin T <0.010 0.000 - 0.030 ng/mL     Note: In addition to lab results from this visit, the labs listed above may include labs taken at another facility or during a different encounter within the last 24 hours. Please correlate lab times with ED admission and discharge times for further clarification of the services performed during this visit.    CT Chest Without Contrast Diagnostic   Final Result   Minimal groundglass infiltrate in the medial  perihilar region of the right upper lobe and right middle lobe which could suggest early developing pneumonia. COVID-19 reporting criteria: Indeterminate. Imaging features can be seen with COVID-19 pneumonia,   although are nonspecific and can can occur with a variety of infectious and noninfectious processes.      Signer Name: Saw Garner MD    Signed: 1/12/2022 8:06 PM    Workstation Name: [facility]     [facility]      XR Chest 1 View   Preliminary Result   No acute cardiopulmonary disease.       D:  01/12/2022   E:  01/12/2022                Vitals:    01/12/22 1930 01/12/22 2000 01/12/22 2030 01/12/22 2130   BP: 131/86 130/86 128/80 125/82   BP Location:       Patient Position:       Pulse: 82 73 68 73   Resp:       Temp:       TempSrc:       SpO2: 95% 93% 96% 94%   Weight:       Height:         Medications   sodium chloride 0.9 % flush 10 mL (has no administration in time range)   sodium chloride 0.9 % flush 10 mL (has no administration in time range)   enoxaparin (LOVENOX) syringe 70 mg (has no administration in time range)   aspirin chewable tablet 324 mg (324 mg Oral Given 1/12/22 1922)   dexamethasone (DECADRON) injection 8 mg (8 mg Intravenous Given 1/12/22 1930)   sodium chloride 0.9 % bolus 1,000 mL (1,000 mL Intravenous New Bag 1/12/22 1930)   acetaminophen (TYLENOL) tablet 1,000 mg (1,000 mg Oral Given 1/12/22 2039)   metoclopramide (REGLAN) injection 10 mg (10 mg Intravenous Given 1/12/22 2156)   diphenhydrAMINE (BENADRYL) injection 25 mg (25 mg Intravenous Given 1/12/22 2156)     ECG/EMG Results (last 24 hours)     Procedure Component Value Units Date/Time    ECG 12 Lead [[id number]] Collected: 01/12/22 1434     Updated: 01/12/22 1435    ECG 12 Lead [[id number]] Collected: 01/12/22 1916     Updated: 01/12/22 1914        ECG 12 Lead         ECG 12 Lead                                                 HEART Score (for prediction of 6-week risk of major adverse cardiac event)  reviewed and/or performed as part of the patient evaluation and treatment planning process.  The result associated with this review/performance is: 2       MDM    Final diagnoses:   Atypical chest pain   Shortness of breath   Pneumonia due to COVID-19 virus   Tobacco abuse   Positive D dimer   Diarrhea, unspecified type   Malaise and fatigue   Anxiety and depression   History of gastroesophageal reflux (GERD)       ED Disposition  ED Disposition     ED Disposition Condition Comment    Decision to Admit            No follow-up provider specified.       Medication List      ASK your doctor about these medications    dexamethasone 1 MG tablet  Commonly known as: DECADRON  Take 2 tablets by mouth 3 (Three) Times a Day With Meals for 1 day.  Ask about: Should I take this medication?             Staci Hines PA-C  01/12/22 6701

## 2022-01-13 NOTE — PLAN OF CARE
Problem: Adult Inpatient Plan of Care  Goal: Plan of Care Review  Outcome: Ongoing, Progressing  Flowsheets (Taken 1/13/2022 1421)  Progress: no change  Plan of Care Reviewed With: patient  Outcome Summary:   VSS on room air   no complaints at this time, no c/o chest pain   VQ scan results pending   will continue to monitor  Goal: Patient-Specific Goal (Individualized)  Outcome: Ongoing, Progressing  Goal: Absence of Hospital-Acquired Illness or Injury  Outcome: Ongoing, Progressing  Intervention: Identify and Manage Fall Risk  Recent Flowsheet Documentation  Taken 1/13/2022 1400 by Milly Palmer RN  Safety Promotion/Fall Prevention:   activity supervised   assistive device/personal items within reach   clutter free environment maintained   fall prevention program maintained   nonskid shoes/slippers when out of bed   room organization consistent   safety round/check completed   toileting scheduled  Taken 1/13/2022 1200 by Milly Palmer RN  Safety Promotion/Fall Prevention:   activity supervised   assistive device/personal items within reach   clutter free environment maintained   fall prevention program maintained   nonskid shoes/slippers when out of bed   room organization consistent   safety round/check completed   toileting scheduled  Taken 1/13/2022 1000 by Milly Palmer RN  Safety Promotion/Fall Prevention:   activity supervised   assistive device/personal items within reach   clutter free environment maintained   fall prevention program maintained   nonskid shoes/slippers when out of bed   room organization consistent   safety round/check completed   toileting scheduled  Taken 1/13/2022 0800 by Milly Palmer RN  Safety Promotion/Fall Prevention:   activity supervised   assistive device/personal items within reach   clutter free environment maintained   fall prevention program maintained   nonskid shoes/slippers when out of bed   room organization consistent   safety round/check  completed   toileting scheduled  Intervention: Prevent Skin Injury  Recent Flowsheet Documentation  Taken 1/13/2022 1400 by Milly Palmer RN  Body Position: position changed independently  Taken 1/13/2022 1200 by Milly Palmer RN  Body Position: position changed independently  Taken 1/13/2022 1000 by Milly Palmer RN  Body Position: position changed independently  Taken 1/13/2022 0800 by Milly Palmer RN  Body Position: position changed independently  Intervention: Prevent and Manage VTE (venous thromboembolism) Risk  Recent Flowsheet Documentation  Taken 1/13/2022 0800 by Milly Palmer RN  VTE Prevention/Management:   bilateral   dorsiflexion/plantar flexion performed   bleeding risk factor(s) identified  Intervention: Prevent Infection  Recent Flowsheet Documentation  Taken 1/13/2022 1400 by Milly Palmer RN  Infection Prevention:   rest/sleep promoted   single patient room provided  Taken 1/13/2022 1200 by Milly Palmer RN  Infection Prevention:   rest/sleep promoted   single patient room provided  Taken 1/13/2022 1000 by Milly Palmer RN  Infection Prevention:   single patient room provided   rest/sleep promoted  Taken 1/13/2022 0800 by Milly Palmer RN  Infection Prevention:   rest/sleep promoted   single patient room provided  Goal: Optimal Comfort and Wellbeing  Outcome: Ongoing, Progressing  Intervention: Provide Person-Centered Care  Recent Flowsheet Documentation  Taken 1/13/2022 0800 by Milly Palmer RN  Trust Relationship/Rapport:   care explained   choices provided   emotional support provided   questions answered   empathic listening provided   questions encouraged   thoughts/feelings acknowledged   reassurance provided  Goal: Readiness for Transition of Care  Outcome: Ongoing, Progressing  Intervention: Mutually Develop Transition Plan  Recent Flowsheet Documentation  Taken 1/13/2022 1022 by Milly Palmer RN  Transportation Anticipated: family or  friend will provide  Patient/Family Anticipated Services at Transition: none  Patient/Family Anticipates Transition to: home with family  Taken 1/13/2022 1019 by Milly Palmer, RN  Equipment Currently Used at Home: cpap     Problem: Activity Intolerance (Pulmonary Impairment)  Goal: Improved Activity Tolerance  Outcome: Ongoing, Progressing     Problem: Airway Clearance Ineffective (Pulmonary Impairment)  Goal: Effective Airway Clearance  Outcome: Ongoing, Progressing     Problem: Gas Exchange Impaired (Pulmonary Impairment)  Goal: Optimal Gas Exchange  Outcome: Ongoing, Progressing   Goal Outcome Evaluation:  Plan of Care Reviewed With: patient        Progress: no change  Outcome Summary: VSS on room air; no complaints at this time, no c/o chest pain; VQ scan results pending; will continue to monitor

## 2022-01-13 NOTE — PROGRESS NOTES
[facility] Medicine Services  PROGRESS NOTE    Patient Name: Lisa Limon  : 1965  MRN: [id number]    Date of Admission: 2022  Primary Care Physician: Natasha Torres APRN    Subjective   Subjective     CC:  F/U chest pain    HPI:  Seen this morning. Denies further chest pain since admission. O2 sats dropping to 91-92% during my encounter. She feels like she may have a sinus infection.     ROS:  Gen-no fevers, no chills  CV-no chest pain, no palpitations  Resp-no cough, no dyspnea  GI-no N/V/D, no abd pain    All other systems reviewed and negative except any additional pertinent positives and negatives as discussed in HPI.      Objective   Objective     Vital Signs:   Temp:  [98.1 °F (36.7 °C)-98.5 °F (36.9 °C)] 98.1 °F (36.7 °C)  Heart Rate:  [63-97] 64  Resp:  [16-18] 18  BP: (106-161)/(62-94) 111/62     Physical Exam:  With patient's consent, physical exam was conducted via visual telemedicine encounter due to patient's current isolation requirements in the interest of PPE conservation.    Constitutional: No acute distress, awake, alert, nontoxic, normal body habitus  HENT: NCAT, MMM, no conjunctival injection  Respiratory: Good effort, nonlabored respirations on room air  Cardiovascular:  tele with NSR  Musculoskeletal: No edema, normal muscle tone and mass for age  Psychiatric: Appropriate affect, good insight and judgement, cooperative  Neurologic: Oriented x 3, movements symmetric BUE and BLE, speech clear and fluent  Skin: No visible rashes, no jaundice seen on exposed skin through window        Results Reviewed:  LAB RESULTS:      Lab 22  0414 22  1456 01/10/22  1057 22  0800   WBC 7.16 9.67 9.64 6.30   HEMOGLOBIN 13.7 15.8 15.6 15.4   HEMATOCRIT 40.3 45.9 45.7 45.3   PLATELETS 261 272 209 154   NEUTROS ABS  --  5.42 6.00 4.36   IMMATURE GRANS (ABS)  --  0.08* 0.02 0.01   LYMPHS ABS  --  3.36* 2.62 1.34   MONOS ABS  --  0.77 0.97* 0.57   EOS  ABS  --  0.02 0.01 0.01   MCV 94.6 95.2 95.0 94.4   CRP  --  0.36  --   --    PROCALCITONIN  --  0.05  --  0.05   LACTATE  --  1.3  --  2.1*   LDH  --  216*  --   --    D DIMER QUANT  --   --  1.61*  --          Lab 01/13/22  0414 01/12/22  1456 01/10/22  1057 01/09/22  0800   SODIUM 140 140 138 138   POTASSIUM 4.0 3.7 3.4* 3.4*   CHLORIDE 107 103 103 102   CO2 20.0* 24.0 20.0* 23.0   ANION GAP 13.0 13.0 15.0 13.0   BUN 12 11 11 7   CREATININE 0.62 0.77 0.78 0.85   GLUCOSE 177* 97 131* 136*   CALCIUM 8.7 9.5 9.3 9.3         Lab 01/12/22  1456 01/10/22  1057 01/09/22  0800   TOTAL PROTEIN 7.6 7.6 7.8   ALBUMIN 4.50 4.50 4.70   GLOBULIN 3.1 3.1 3.1   ALT (SGPT) 18 16 18   AST (SGOT) 17 18 19   BILIRUBIN 0.7 0.7 0.8   ALK PHOS 114 100 102   LIPASE 38  --   --          Lab 01/12/22 2047 01/12/22  1456 01/10/22  1057 01/09/22  0800   PROBNP  --  93.2 82.5  --    TROPONIN T <0.010 <0.010 <0.010 <0.010                 Brief Urine Lab Results  (Last result in the past 365 days)      Color   Clarity   Blood   Leuk Est   Nitrite   Protein   CREAT   Urine HCG        01/09/22 0827 Yellow   Clear   Negative   Negative   Negative   Negative                 Microbiology Results Abnormal     None          CT Chest Without Contrast Diagnostic    Result Date: 1/12/2022  CT Chest WO INDICATION: Chest pain and shortness of air. Covid positive. TECHNIQUE: CT of the chest without IV contrast. Coronal and sagittal reformatted images. Radiation dose reduction techniques included automated exposure control or exposure modulation based on body size. Count of known CT and cardiac nuc med studies performed in previous 12 months: 0. COMPARISON: CT chest 3/21/2020 FINDINGS: Thoracic aorta normal in course and caliber. Heart size is normal. No pericardial effusion. No pleural effusion. No pneumothorax. Subsegmental lingular atelectasis. Minimal groundglass infiltrate in the medial perihilar region of the right upper lobe and right middle lobe.  Visualized upper abdomen is unremarkable. Cholecystectomy. No acute osseous abnormality.     Impression: Minimal groundglass infiltrate in the medial perihilar region of the right upper lobe and right middle lobe which could suggest early developing pneumonia. COVID-19 reporting criteria: Indeterminate. Imaging features can be seen with COVID-19 pneumonia, although are nonspecific and can can occur with a variety of infectious and noninfectious processes. Signer Name: Saw Garner MD  Signed: 1/12/2022 8:06 PM  Workstation Name: [email or web address]  [facility]    XR Chest 1 View    Result Date: 1/12/2022  EXAMINATION: XR CHEST 1 VW- 01/12/2022  INDICATION: Chest Pain triage protocol  COMPARISON: 01/09/2022  FINDINGS: Portable chest reveals cardiac and mediastinal silhouettes within normal limits. The lung fields are grossly clear. No focal parenchymal opacification present. No pleural effusion or pneumothorax. The bony structures are unremarkable. Pulmonary vascularity is within normal limits.        Impression: No acute cardiopulmonary disease.  D:  01/12/2022 E:  01/12/2022         Results for orders placed during the hospital encounter of 02/28/19    Adult Transthoracic Echo Complete W/ Cont if Necessary Per Protocol    Interpretation Summary  · Left ventricular systolic function is normal. Estimated EF = 60%.  · The cardiac valves are anatomically and functionally normal.  · Estimated right ventricular systolic pressure from tricuspid regurgitation is normal (<35 mmHg).      I have reviewed the medications:  Scheduled Meds:albuterol sulfate HFA, 2 puff, Inhalation, 4x Daily - RT  dexamethasone, 6 mg, Oral, Daily With Breakfast  enoxaparin, 1 mg/kg, Subcutaneous, Q12H  sodium chloride, 10 mL, Intravenous, Q12H      Continuous Infusions:   PRN Meds:.•  acetaminophen  •  benzonatate  •  hydrOXYzine  •  melatonin  •  ondansetron  •  sodium chloride  •  [COMPLETED] Insert peripheral IV **AND**  sodium chloride  •  sodium chloride    Assessment/Plan   Assessment & Plan     Active Hospital Problems    Diagnosis  POA   • GERD without esophagitis [K21.9]  Yes   • Anxiety and depression [F41.9, F32.A]  Yes   • Tobacco abuse [Z72.0]  Yes   • Chest pain [R07.9]  Yes   • Pneumonia due to COVID-19 virus [U07.1, J12.82]  Yes   • Obstructive sleep apnea, adult [G47.33]  Yes      Resolved Hospital Problems   No resolved problems to display.        Brief Hospital Course to date:  Lisa Limon is a 56 y.o. female with hx of GERD, CHANTE, anxiety/depression, and ongoing tobacco abuse who presents due to chest pain. Has been having COVID-19 symptoms since 12/30/21, tested positive on 1/9/22. Has been evaluated in the ER 4 times for dyspnea and rash. Was saturating >94% on admission. EKG and troponins unremarkable. Labs showed K 3.4 and elevated D-dimer 1.61. CRP, lactate, procal all normal. CT chest without contrast showed minimal ground glass infiltrate in the medial perihilar region of the RUL and RML. Admitted for further management.    *All problems are new to me today.    Chest pain, atypical  Rule out PE  --EKG and troponins negative. LHC in 2019 was normal.  --PE needs to be ruled out given COVID-19 infection and elevated D-dimer. Unable to do CTA due to contrast allergy, so V/Q scan has been ordered.  --Cover with therapeutic Lovenox for now.    COVID-19  --CT chest with minimal infiltrate.  --No evidence of secondary bacterial infection--explained to patient as she was inquiring about antibiotics. No indication for antibiotic therapy.   --O2 sats intermittently dipping down to 91-92% on room air. Will start Decadron 6 mg daily.  --Out of window for Remdesivir, and based on prior ED visit notes, she had refused this medication anyway.  --Her labs including CRP are otherwise normal. Monitor.   --Supportive care.    Hypokalemia  --Replaced per protocol with improvement.    Tobacco abuse  --Nicotine patch  offered.  --Counseled on cessation.    DVT prophylaxis:  Medical DVT prophylaxis orders are present.       AM-PAC 6 Clicks Score (PT): 24 (01/13/22 0800)    Disposition: I expect the patient to be discharged home ~1-2 days.    CODE STATUS:   Code Status and Medical Interventions:   Ordered at: 01/12/22 0611     Level Of Support Discussed With:    Patient     Code Status (Patient has no pulse and is not breathing):    CPR (Attempt to Resuscitate)     Medical Interventions (Patient has pulse or is breathing):    Full Support       Shelley Stuart MD  01/13/22

## 2022-01-13 NOTE — H&P
[facility] Medicine Services  HISTORY AND PHYSICAL    Patient Name: Lisa Limon  : 1965  MRN: [id number]  Primary Care Physician: Natasha Torres APRN  Date of admission: 2022    Subjective   Subjective     Chief Complaint:  Chest pain    HPI:  Lisa Limon is a 56 y.o. female with a past medical history significant for anxiety/depression, GERD, CHANTE, and ongoing tobacco abuse. She presents today with complaints of persistent COVID-19 symptoms and and retrosternal chest pain. Pain onset this morning upon awakening. Characterized as a sharp, stabbing sensation without radiation. Worse with deep inspiration. There is associated cough without congestion, myalgias, loss of taste and smell, fatigue, subjective fever, nausea without vomiting and loose stool. Patient is not vaccinated and reports onset of symptoms 21. She tested positive for COVID-19 on 22. Patient has since been evaluated in ED 4 times for dyspnea and pruritic rash related to COVID-19, and weakness. At one point the patient left AMA.  Currently there are no complaints of syncope or lower extremity pain or swelling. No abdominal pain or N/v/D. No history of CAD, CHF, or COPD. Will admit to inpatient for further evaluti      COVID Details:    Symptoms:    [] NONE [] Fever []  Cough [x] Shortness of breath [x] Change in taste/smell       Review of Systems   Constitutional: Positive for chills, fatigue and fever.   HENT: Negative for congestion and trouble swallowing.    Eyes: Negative for photophobia and visual disturbance.   Respiratory: Positive for cough and shortness of breath.    Cardiovascular: Positive for chest pain. Negative for leg swelling.   Gastrointestinal: Negative for abdominal pain, diarrhea, nausea and vomiting.   Endocrine: Negative for cold intolerance and heat intolerance.   Genitourinary: Negative for dysuria and flank pain.   Musculoskeletal: Positive for myalgias. Negative  "for back pain and gait problem.   Skin: Negative for pallor and rash.   Allergic/Immunologic: Negative for immunocompromised state.   Neurological: Negative for dizziness, weakness and headaches.   Hematological: Negative for adenopathy.   Psychiatric/Behavioral: Negative for agitation and confusion.        All other systems reviewed and are negative.     Personal History     Past Medical History:   Diagnosis Date   • Acid reflux    • Anxiety    • Colon polyps    • Depression    • Gall stones    • Ovarian cyst    • Pancreatitis    • Ulcer    • UTI (urinary tract infection)        Past Surgical History:   Procedure Laterality Date   • CHOLECYSTECTOMY  2007   • HYSTERECTOMY  2010   • TUBAL ABDOMINAL LIGATION  1994       Family History: family history includes Diabetes in her paternal aunt, paternal uncle, and another family member; Heart attack in her father; Migraines in her mother; No Known Problems in her maternal grandfather, maternal grandmother, paternal grandfather, paternal grandmother, and sister; Stroke in her brother. Otherwise pertinent FHx was reviewed and unremarkable.     Social History:  reports that she has been smoking cigarettes. She started smoking about 37 years ago. She has a 9.00 pack-year smoking history. She has never used smokeless tobacco. She reports that she does not drink alcohol and does not use drugs.  Social History     Social History Narrative    Caffeine: 1 Dr. Pepper daily    Caffeine 2 dr melton's daily       Medications:  hydrOXYzine and hydrOXYzine pamoate    Allergies   Allergen Reactions   • Iodinated Diagnostic Agents Anaphylaxis   • Zithromax [Azithromycin] Other (See Comments)     \"Had to go to ER with Chest Pain\"   • Amoxicillin Rash and Other (See Comments)     Rash and yeast infection   • Hydrocodone-Acetaminophen Rash   • Tetanus Toxoids Rash and GI Intolerance       Objective   Objective     Vital Signs:   Temp:  [97.8 °F (36.6 °C)] 97.8 °F (36.6 °C)  Heart Rate:  " [68-89] 82  Resp:  [16] 16  BP: (117-161)/(80-94) 129/83    Physical Exam   Constitutional: Awake, alert  Eyes: PERRLA, sclerae anicteric, no conjunctival injection  HENT: NCAT, mucous membranes moist  Neck: Supple, no thyromegaly, no lymphadenopathy, trachea midline  Respiratory: Clear to auscultation bilaterally, nonlabored respirations   Cardiovascular: RRR, no murmurs, rubs, or gallops, palpable pedal pulses bilaterally  Gastrointestinal: Positive bowel sounds, soft, nontender, nondistended  Musculoskeletal: No bilateral ankle edema, no clubbing or cyanosis to extremities  Psychiatric: Appropriate affect, cooperative  Neurologic: Oriented x 3, strength symmetric in all extremities, Cranial Nerves grossly intact to confrontation, speech clear  Skin: No rashes      Results Reviewed:  I have personally reviewed most recent indicated data and agree with findings including:  [x]  Laboratory  [x]  Radiology  []  EKG/Telemetry  []  Pathology  []  Cardiac/Vascular Studies  []  Old records  []  Other:  Most pertinent findings include: vitals stable. Troponin negative X2. EKG normal EKG. D-dimer 1.61. Ct chest consistent with COVID-19 pneumonia.      LAB RESULTS:      Lab 01/12/22  1456 01/10/22  1057 01/09/22  0800   WBC 9.67 9.64 6.30   HEMOGLOBIN 15.8 15.6 15.4   HEMATOCRIT 45.9 45.7 45.3   PLATELETS 272 209 154   NEUTROS ABS 5.42 6.00 4.36   IMMATURE GRANS (ABS) 0.08* 0.02 0.01   LYMPHS ABS 3.36* 2.62 1.34   MONOS ABS 0.77 0.97* 0.57   EOS ABS 0.02 0.01 0.01   MCV 95.2 95.0 94.4   CRP 0.36  --   --    PROCALCITONIN 0.05  --  0.05   LACTATE 1.3  --  2.1*   *  --   --    D DIMER QUANT  --  1.61*  --          Lab 01/12/22  1456 01/10/22  1057 01/09/22  0800   SODIUM 140 138 138   POTASSIUM 3.7 3.4* 3.4*   CHLORIDE 103 103 102   CO2 24.0 20.0* 23.0   ANION GAP 13.0 15.0 13.0   BUN 11 11 7   CREATININE 0.77 0.78 0.85   GLUCOSE 97 131* 136*   CALCIUM 9.5 9.3 9.3         Lab 01/12/22  1456 01/10/22  1052  01/09/22  0800   TOTAL PROTEIN 7.6 7.6 7.8   ALBUMIN 4.50 4.50 4.70   GLOBULIN 3.1 3.1 3.1   ALT (SGPT) 18 16 18   AST (SGOT) 17 18 19   BILIRUBIN 0.7 0.7 0.8   ALK PHOS 114 100 102   LIPASE 38  --   --          Lab 01/12/22  2047 01/12/22  1456 01/10/22  1057 01/09/22  0800   PROBNP  --  93.2 82.5  --    TROPONIN T <0.010 <0.010 <0.010 <0.010                 Brief Urine Lab Results  (Last result in the past 365 days)      Color   Clarity   Blood   Leuk Est   Nitrite   Protein   CREAT   Urine HCG        01/09/22 0827 Yellow   Clear   Negative   Negative   Negative   Negative               Microbiology Results (last 10 days)     Procedure Component Value - Date/Time    Rapid Strep A Screen - Swab, Throat [[id number]]  (Normal) Collected: 01/09/22 0827    Lab Status: Final result Specimen: Swab from Throat Updated: 01/09/22 0846     Strep A Ag Negative    Narrative:      Test performed by Direct Antigen Testing.    Beta Strep Culture, Throat - Swab, Throat [[id number]]  (Normal) Collected: 01/09/22 0827    Lab Status: Final result Specimen: Swab from Throat Updated: 01/11/22 0732     Throat Culture, Beta Strep No Beta Hemolytic Streptococcus Isolated    Narrative:      Group A Strep incidence is low in adults. Positive culture for Beta hemolytic Streptococcus species can reflect colonization and not true infection. Please correlate clinically.    COVID PRE-OP / PRE-PROCEDURE SCREENING ORDER (NO ISOLATION) - Swab, Nasopharynx [[id number]]  (Abnormal) Collected: 01/09/22 0749    Lab Status: Final result Specimen: Swab from Nasopharynx Updated: 01/09/22 0830    Narrative:      The following orders were created for panel order COVID PRE-OP / PRE-PROCEDURE SCREENING ORDER (NO ISOLATION) - Swab, Nasopharynx.  Procedure                               Abnormality         Status                     ---------                               -----------         ------                     COVID-19 and FLU A/B PCR...[[id number]]   Abnormal            Final result                 Please view results for these tests on the individual orders.    COVID-19 and FLU A/B PCR - Swab, Nasopharynx [[id number]]  (Abnormal) Collected: 01/09/22 0749    Lab Status: Final result Specimen: Swab from Nasopharynx Updated: 01/09/22 0830     COVID19 Detected     Influenza A PCR Not Detected     Influenza B PCR Not Detected    Narrative:      Fact sheet for providers: https://www.fda.gov/media/357061/download    Fact sheet for patients: https://www.fda.gov/media/325175/download    Test performed by PCR.  Influenza A and Influenza B negative results should be considered presumptive in samples that have a positive SARS-CoV-2 result.    Competitive inhibition studies showed that SARS-CoV-2 virus, when present at concentrations above 3.6E+04 copies/mL, can inhibit the detection and amplification of influenza A and influenza B virus RNA if present at or below 1.8E+02 copies/mL or 4.9E+02 copies/mL, respectively, and may lead to false negative influenza virus results. If co-infection with influenza A or influenza B virus is suspected in samples with a positive SARS-CoV-2 result, the sample should be re-tested with another FDA cleared, approved, or authorized influenza test, if influenza virus detection would change clinical management.          CT Chest Without Contrast Diagnostic    Result Date: 1/12/2022  CT Chest WO INDICATION: Chest pain and shortness of air. Covid positive. TECHNIQUE: CT of the chest without IV contrast. Coronal and sagittal reformatted images. Radiation dose reduction techniques included automated exposure control or exposure modulation based on body size. Count of known CT and cardiac nuc med studies performed in previous 12 months: 0. COMPARISON: CT chest 3/21/2020 FINDINGS: Thoracic aorta normal in course and caliber. Heart size is normal. No pericardial effusion. No pleural effusion. No pneumothorax. Subsegmental lingular atelectasis. Minimal  groundglass infiltrate in the medial perihilar region of the right upper lobe and right middle lobe. Visualized upper abdomen is unremarkable. Cholecystectomy. No acute osseous abnormality.     Impression: Minimal groundglass infiltrate in the medial perihilar region of the right upper lobe and right middle lobe which could suggest early developing pneumonia. COVID-19 reporting criteria: Indeterminate. Imaging features can be seen with COVID-19 pneumonia, although are nonspecific and can can occur with a variety of infectious and noninfectious processes. Signer Name: Saw Garner MD  Signed: 1/12/2022 8:06 PM  Workstation Name: [email or web address]  [facility]    XR Chest 1 View    Result Date: 1/12/2022  EXAMINATION: XR CHEST 1 VW- 01/12/2022  INDICATION: Chest Pain triage protocol  COMPARISON: 01/09/2022  FINDINGS: Portable chest reveals cardiac and mediastinal silhouettes within normal limits. The lung fields are grossly clear. No focal parenchymal opacification present. No pleural effusion or pneumothorax. The bony structures are unremarkable. Pulmonary vascularity is within normal limits.        Impression: No acute cardiopulmonary disease.  D:  01/12/2022 E:  01/12/2022         Results for orders placed during the hospital encounter of 02/28/19    Adult Transthoracic Echo Complete W/ Cont if Necessary Per Protocol    Interpretation Summary  · Left ventricular systolic function is normal. Estimated EF = 60%.  · The cardiac valves are anatomically and functionally normal.  · Estimated right ventricular systolic pressure from tricuspid regurgitation is normal (<35 mmHg).      Assessment/Plan   Assessment & Plan       Chest pain    Pneumonia due to COVID-19 virus    Obstructive sleep apnea, adult    GERD without esophagitis    Anxiety and depression    Tobacco abuse      1. Chest pain:  **no chest pain currently  - troponin negative X3. EKG normal sinus.  - LHC in 2019 WNL  - D-dimer elevated.  Severe allergy to contrast  - started on therapeutic lovenox  - obtain V/Q scan In am  - consider echocardiogram given pt's 4th visit to ER    2. COVID-19 Pneumonia:  **minimally symptomatic with mild RAMOS/cough which may be baseline given tob abuse  - oxygen favorable at 94%  - outside window for remdesivir  - consider decadron as needed  - pulmonary toilet and supportive care    3. Tobacco abuse:  - nicotine patch as needed  - smokes 1/2 PPD  - plan to discuss cessation    4. Hypokalemia:  - check magnesium  - replace needed per protocol    DVT prophylaxis:  lovenox    CODE STATUS:  Full code  Level Of Support Discussed With: Patient  Code Status (Patient has no pulse and is not breathing): CPR (Attempt to Resuscitate)  Medical Interventions (Patient has pulse or is breathing): Full Support      This note has been completed as part of a split-shared workflow.     Electronically signed by Monica Crain PA-C, 01/13/22, 12:03 AM EST.        Attending   Admission Attestation       I have seen and examined the patient, performing an independent face-to-face diagnostic evaluation with plan of care reviewed and developed with the advanced practice clinician (APC).      Brief Summary Statement:   Lisa Limon is a 56 y.o. female who has been to our ER 4 times now since 1/9 after developing symptoms at the end of December with chest pain and shortness of breath.  During one evaluation, admission was recommended for v/q scan w/ elevated DDimer and inability to perform CCTA secondary to contrast allergy.  Pt presents now w/ persistent complaints of  Chest pain and shortness of breath.  She was diagnosed with COVID on 1/9/2021.  Pt states she has not had her covid vaccines.  Pt only expresses interest in antibiotics on my evaluation.  Reportedly she has had loss of taste and smell as well as fatigue, diarrhea.    Remainder of detailed HPI is as noted by APC and has been reviewed and/or edited by me for  completeness.    Attending Physical Exam:   separate exam performed by me w/ no changes to the above.    Brief Assessment/Plan :  See detailed assessment and plan developed with APC which I have reviewed and/or edited for completeness.        Admission Status: I believe that this patient meets  OBSERVATION status, however if further evaluation or treatment plans warrant, status may change.  Based upon current information, I predict patient's care encounter to be less than or equal to 2 midnights.  Electronically signed by Nely Figueredo MD, 01/13/22, 1:33 AM IGNACIO.        Nely Figueredo MD  01/13/22

## 2022-01-13 NOTE — OUTREACH NOTE
COVID-19 Week 1 Survey      Responses   [facility] patient discharged from? [facility]   Does the patient have one of the following disease processes/diagnoses(primary or secondary)? COVID-19   COVID-19 underlying condition? None   Call Number Call 3   Week 1 Call successful? No   Revoke Readmitted   Discharge diagnosis rash,  Covid 19          Alida Palacio RN

## 2022-01-14 ENCOUNTER — READMISSION MANAGEMENT (OUTPATIENT)
Dept: CALL CENTER | Facility: HOSPITAL | Age: 57
End: 2022-01-14

## 2022-01-14 VITALS
DIASTOLIC BLOOD PRESSURE: 82 MMHG | WEIGHT: 154 LBS | HEART RATE: 73 BPM | SYSTOLIC BLOOD PRESSURE: 126 MMHG | HEIGHT: 64 IN | TEMPERATURE: 98 F | OXYGEN SATURATION: 98 % | RESPIRATION RATE: 16 BRPM | BODY MASS INDEX: 26.29 KG/M2

## 2022-01-14 PROBLEM — R07.9 CHEST PAIN: Status: RESOLVED | Noted: 2022-01-12 | Resolved: 2022-01-14

## 2022-01-14 LAB
ALBUMIN SERPL-MCNC: 3.9 G/DL (ref 3.5–5.2)
ALBUMIN/GLOB SERPL: 1.6 G/DL
ALP SERPL-CCNC: 93 U/L (ref 39–117)
ALT SERPL W P-5'-P-CCNC: 14 U/L (ref 1–33)
ANION GAP SERPL CALCULATED.3IONS-SCNC: 13 MMOL/L (ref 5–15)
AST SERPL-CCNC: 12 U/L (ref 1–32)
BILIRUB SERPL-MCNC: 0.5 MG/DL (ref 0–1.2)
BUN SERPL-MCNC: 12 MG/DL (ref 6–20)
BUN/CREAT SERPL: 21.1 (ref 7–25)
CALCIUM SPEC-SCNC: 9.1 MG/DL (ref 8.6–10.5)
CHLORIDE SERPL-SCNC: 107 MMOL/L (ref 98–107)
CO2 SERPL-SCNC: 21 MMOL/L (ref 22–29)
CREAT SERPL-MCNC: 0.57 MG/DL (ref 0.57–1)
CRP SERPL-MCNC: <0.3 MG/DL (ref 0–0.5)
DEPRECATED RDW RBC AUTO: 46.6 FL (ref 37–54)
ERYTHROCYTE [DISTWIDTH] IN BLOOD BY AUTOMATED COUNT: 13.1 % (ref 12.3–15.4)
GFR SERPL CREATININE-BSD FRML MDRD: 111 ML/MIN/1.73
GLOBULIN UR ELPH-MCNC: 2.5 GM/DL
GLUCOSE SERPL-MCNC: 109 MG/DL (ref 65–99)
HCT VFR BLD AUTO: 40.1 % (ref 34–46.6)
HGB BLD-MCNC: 13.4 G/DL (ref 12–15.9)
MCH RBC QN AUTO: 32.6 PG (ref 26.6–33)
MCHC RBC AUTO-ENTMCNC: 33.4 G/DL (ref 31.5–35.7)
MCV RBC AUTO: 97.6 FL (ref 79–97)
PLATELET # BLD AUTO: 261 10*3/MM3 (ref 140–450)
PMV BLD AUTO: 9.6 FL (ref 6–12)
POTASSIUM SERPL-SCNC: 4 MMOL/L (ref 3.5–5.2)
PROT SERPL-MCNC: 6.4 G/DL (ref 6–8.5)
RBC # BLD AUTO: 4.11 10*6/MM3 (ref 3.77–5.28)
SODIUM SERPL-SCNC: 141 MMOL/L (ref 136–145)
WBC NRBC COR # BLD: 9.89 10*3/MM3 (ref 3.4–10.8)

## 2022-01-14 PROCEDURE — 80053 COMPREHEN METABOLIC PANEL: CPT | Performed by: HOSPITALIST

## 2022-01-14 PROCEDURE — 86140 C-REACTIVE PROTEIN: CPT | Performed by: HOSPITALIST

## 2022-01-14 PROCEDURE — 85027 COMPLETE CBC AUTOMATED: CPT | Performed by: HOSPITALIST

## 2022-01-14 PROCEDURE — G0378 HOSPITAL OBSERVATION PER HR: HCPCS

## 2022-01-14 PROCEDURE — 63710000001 DEXAMETHASONE PER 0.25 MG: Performed by: HOSPITALIST

## 2022-01-14 PROCEDURE — 99217 PR OBSERVATION CARE DISCHARGE MANAGEMENT: CPT | Performed by: HOSPITALIST

## 2022-01-14 RX ORDER — DEXAMETHASONE 6 MG/1
6 TABLET ORAL
Qty: 7 TABLET | Refills: 0 | Status: SHIPPED | OUTPATIENT
Start: 2022-01-15 | End: 2022-01-14

## 2022-01-14 RX ORDER — DEXAMETHASONE 6 MG/1
6 TABLET ORAL
Qty: 4 TABLET | Refills: 0 | Status: SHIPPED | OUTPATIENT
Start: 2022-01-15 | End: 2022-01-19

## 2022-01-14 RX ORDER — LORAZEPAM 0.5 MG/1
0.5 TABLET ORAL ONCE
Status: COMPLETED | OUTPATIENT
Start: 2022-01-14 | End: 2022-01-14

## 2022-01-14 RX ORDER — LORAZEPAM 0.5 MG/1
TABLET ORAL
COMMUNITY
Start: 2021-10-23 | End: 2022-01-14 | Stop reason: HOSPADM

## 2022-01-14 RX ORDER — HYDROXYZINE PAMOATE 25 MG/1
25 CAPSULE ORAL 3 TIMES DAILY PRN
Qty: 30 CAPSULE | Refills: 0 | Status: SHIPPED | OUTPATIENT
Start: 2022-01-14 | End: 2022-01-27

## 2022-01-14 RX ORDER — BENZONATATE 100 MG/1
100 CAPSULE ORAL 3 TIMES DAILY PRN
Qty: 15 CAPSULE | Refills: 0 | Status: SHIPPED | OUTPATIENT
Start: 2022-01-14 | End: 2022-01-27

## 2022-01-14 RX ORDER — ALPRAZOLAM 0.25 MG/1
0.25 TABLET ORAL 2 TIMES DAILY PRN
Status: DISCONTINUED | OUTPATIENT
Start: 2022-01-14 | End: 2022-01-14

## 2022-01-14 RX ORDER — LORAZEPAM 0.5 MG/1
0.5 TABLET ORAL EVERY 12 HOURS PRN
Qty: 6 TABLET | Refills: 0 | Status: SHIPPED | OUTPATIENT
Start: 2022-01-14 | End: 2022-01-18

## 2022-01-14 RX ADMIN — HYDROXYZINE HYDROCHLORIDE 25 MG: 25 TABLET, FILM COATED ORAL at 04:42

## 2022-01-14 RX ADMIN — LORAZEPAM 0.5 MG: 0.5 TABLET ORAL at 05:30

## 2022-01-14 RX ADMIN — LORAZEPAM 0.5 MG: 0.5 TABLET ORAL at 12:21

## 2022-01-14 RX ADMIN — SODIUM CHLORIDE, PRESERVATIVE FREE 10 ML: 5 INJECTION INTRAVENOUS at 08:11

## 2022-01-14 RX ADMIN — DEXAMETHASONE 6 MG: 2 TABLET ORAL at 08:10

## 2022-01-14 NOTE — PLAN OF CARE
Goal Outcome Evaluation:  Plan of Care Reviewed With: patient           Outcome Summary: vss. sats drop slightly with activity, remains on 2L, no chest pain reported. anxiety relieved by hydroxyzine.     Addendum-increased anxiety this am- got 0.5mg ativan po x1.

## 2022-01-14 NOTE — CASE MANAGEMENT/SOCIAL WORK
Continued Stay Note  [facility]     Patient Name: Lisa Limon  MRN: [id number]  Today's Date: 1/14/2022    Admit Date: 1/12/2022     Discharge Plan     Row Name 01/14/22 1346       Plan    Plan Comments Pulse ox provided to the pt.    Final Discharge Disposition Code 01 - home or self-care               Discharge Codes    No documentation.               Expected Discharge Date and Time     Expected Discharge Date Expected Discharge Time    Jan 14, 2022             Chloe Gan RN

## 2022-01-14 NOTE — PLAN OF CARE
Goal Outcome Evaluation:  Plan of Care Reviewed With: patient           Outcome Summary: Pt VSS on RA. No SOA. No c/o pain. Ativan given due to pt being anxious. Pt being d/c today. IV removed per protocol.

## 2022-01-15 ENCOUNTER — READMISSION MANAGEMENT (OUTPATIENT)
Dept: CALL CENTER | Facility: HOSPITAL | Age: 57
End: 2022-01-15

## 2022-01-15 NOTE — OUTREACH NOTE
COVID-19 Week 1 Survey      Responses   [facility] patient discharged from? [place]   Does the patient have one of the following disease processes/diagnoses(primary or secondary)? COVID-19   COVID-19 underlying condition? None   Call Number Call 1   Week 1 Call successful? Yes   Call start time 1133   Call end time 1142   Discharge diagnosis atypical chest pain, COVID-19 PNA, hypokalemia   Meds reviewed with patient/caregiver? Yes   Is the patient having any side effects they believe may be caused by any medication additions or changes? No   Does the patient have all medications ordered at discharge? Yes   Is the patient taking all medications as directed (includes completed medication regime)? Yes   Does the patient have a primary care provider?  Yes   Comments regarding PCP PATIENT ADVISED TO CALL HER PCP OFFICE MONDAY TO SCHEDULE A FOLLOW UP APPOINTMENT AND SHE STATES SHE WILL DO THAT   Does the patient have an appointment with their PCP or specialist within 7 days of discharge? No   What is preventing the patient from scheduling follow up appointments within 7 days of discharge? Haven't had time   Nursing Interventions Educated patient on importance of making appointment,  Advised patient to make appointment   Has the patient kept scheduled appointments due by today? N/A   Has home health visited the patient within 72 hours of discharge? N/A   What DME was ordered? Sent home w/ pulse ox   Has all DME been delivered? Yes   Psychosocial issues? Yes   Psychosocial comments PATIENT SEEMED VERY ANXIOUS DURING THIS CALL AND MAY HAVE BEEN CRYING. THIS NURSE ENCOURGAED PATIENT TO TAKE ONE OF HER ATIVAN SHE HAS BEEN PRESCRIBED AND REST   Did the patient receive a copy of their discharge instructions? Yes   Did the patient receive a copy of COVID-19 specific instructions? Yes   Nursing interventions Reviewed instructions with patient   What is the patient's perception of their health status since discharge? New  symptoms unrelated to diagnosis  [PATIENT STATES HER HEART RATE . THIS NURSE ASKED PATIENT IF THIS IS A RESTING HEART RATE OR HAS SHE BEEN UP MOVING AROUND. PATIENT VOICES SHE HAS BEEN UP MOVING AROUND. PATIENT ALSO SOUNDED VERY ANXIOUS ON THE PHONE. ]   Nursing Interventions --  [PATIENT ADVISED TO TAKE ONE OF HER ATIVAN FOR ANXIETY, SIT DOWN AND REST AND CALL HER CARDIOLOGIST OFFICE NUMBER TO HAVE THE DOCTOR ON CALL PAGED IF HER HEART RATE CONTINUES TO BE HIGH WHILE RESTING. PATIENT ADVISED THAT MANY FACTORS COULD CAUSE THIS. ]   Pulse Ox monitoring Intermittent   Pulse Ox device source Hospital   O2 Sat comments PATIENT STATES HER O2 SAT IS 97%   O2 Sat: education provided Sat levels,  Monitoring frequency,  When to seek care  [PATIENT EDUCATED REGARDING HER HIGH HEART RATE AND INFORMED THAT IT COULD BE CAUSED BY HER COVID, THE DEXAMETHASONE TABLETS SHE IS TAKING AND HER ANXIETY. PATIENT EDUCATED REGARDING INTERVENTIONS AND TO CALL HER CARDIOLOGIST DOCTOR ON CALL IF IT CONTINUE]   Is the patient/caregiver able to teach back steps to recovery at home? Set small, achievable goals for return to baseline health,  Rest and rebuild strength, gradually increase activity,  Practice good oral hygiene,  Eat a well-balance diet,  Make a list of questions for provider's appointment   If the patient is a current smoker, are they able to teach back resources for cessation? Smoking cessation support groups   Is the patient/caregiver able to teach back the hierarchy of who to call/visit for symptoms/problems? PCP, Specialist, Home health nurse, Urgent Care, ED, 911 Yes   COVID-19 call completed? Yes          Marilyn Riggins LPN

## 2022-01-15 NOTE — OUTREACH NOTE
Prep Survey      Responses   [facility] [place]   Is LACE score < 7 ? No   Emergency Room discharge w/ pulse ox? No   Eligibility [facility]   Date of Admission 01/12/22   Date of Discharge 01/14/22   Discharge Disposition Home or Self Care   Discharge diagnosis atypical chest pain, COVID-19 PNA, hypokalemia   Does the patient have one of the following disease processes/diagnoses(primary or secondary)? COVID-19   Does the patient have Home health ordered? No   Is there a DME ordered? No   Prep survey completed? Yes          Carina Lowe RN

## 2022-01-16 ENCOUNTER — READMISSION MANAGEMENT (OUTPATIENT)
Dept: CALL CENTER | Facility: HOSPITAL | Age: 57
End: 2022-01-16

## 2022-01-16 NOTE — OUTREACH NOTE
COVID-19 Week 1 Survey      Responses   [facility] patient discharged from? [place]   Does the patient have one of the following disease processes/diagnoses(primary or secondary)? COVID-19   COVID-19 underlying condition? None   Call Number Call 2   Week 1 Call successful? No   Discharge diagnosis atypical chest pain, COVID-19 PNA, hypokalemia          Yanique Hadley RN

## 2022-01-17 ENCOUNTER — READMISSION MANAGEMENT (OUTPATIENT)
Dept: CALL CENTER | Facility: HOSPITAL | Age: 57
End: 2022-01-17

## 2022-01-17 NOTE — OUTREACH NOTE
COVID-19 Week 1 Survey      Responses   [facility] patient discharged from? [place]   Does the patient have one of the following disease processes/diagnoses(primary or secondary)? COVID-19   COVID-19 underlying condition? None   Call Number Call 3   Week 1 Call successful? No   Discharge diagnosis atypical chest pain, COVID-19 PNA, hypokalemia          Marilin Gant RN

## 2022-01-18 ENCOUNTER — TELEMEDICINE (OUTPATIENT)
Dept: CARDIOLOGY | Facility: HOSPITAL | Age: 57
End: 2022-01-18

## 2022-01-18 ENCOUNTER — READMISSION MANAGEMENT (OUTPATIENT)
Dept: CALL CENTER | Facility: HOSPITAL | Age: 57
End: 2022-01-18

## 2022-01-18 ENCOUNTER — HOSPITAL ENCOUNTER (OUTPATIENT)
Dept: CARDIOLOGY | Facility: HOSPITAL | Age: 57
Discharge: HOME | End: 2022-01-18

## 2022-01-18 VITALS
WEIGHT: 154 LBS | BODY MASS INDEX: 26.29 KG/M2 | DIASTOLIC BLOOD PRESSURE: 88 MMHG | HEIGHT: 64 IN | OXYGEN SATURATION: 97 % | SYSTOLIC BLOOD PRESSURE: 109 MMHG | HEART RATE: 91 BPM

## 2022-01-18 DIAGNOSIS — U07.1 COVID-19: ICD-10-CM

## 2022-01-18 DIAGNOSIS — F41.9 ANXIETY: ICD-10-CM

## 2022-01-18 DIAGNOSIS — R07.9 CHEST PAIN, UNSPECIFIED TYPE: Primary | ICD-10-CM

## 2022-01-18 DIAGNOSIS — R00.2 PALPITATIONS: ICD-10-CM

## 2022-01-18 PROCEDURE — 93270 REMOTE 30 DAY ECG REV/REPORT: CPT

## 2022-01-18 PROCEDURE — 99214 OFFICE O/P EST MOD 30 MIN: CPT | Performed by: NURSE PRACTITIONER

## 2022-01-18 NOTE — OUTREACH NOTE
COVID-19 Week 1 Survey      Responses   [facility] patient discharged from? [place]   Does the patient have one of the following disease processes/diagnoses(primary or secondary)? COVID-19   COVID-19 underlying condition? None   Call Number Call 4   Week 1 Call successful? No   Discharge diagnosis Pneumonia due to COVID-19 virus          Evelin Alaniz RN

## 2022-01-18 NOTE — PROGRESS NOTES
"[facility]  Heart and Valve Center      Mode of Visit: Video  Location of patient: home  You have chosen to receive care through a telehealth visit.  Does the patient consent to use a video/audio connection for your medical care today? Yes  The visit included audio and video interaction. No technical issues occurred during this visit.     Chief Complaint  Follow-up and Chest Pain    History of Present Illness    Lisa Limon is a 56 y.o. female with GERD, CHANTE, tobacco abuse who presents today as a telemedicine hospital referral for chest pain. Patient tested positive for COVID 1/9, symptoms started 12/30. She had been in the ED 4 times for dyspnea and rash. She presented back to the hospital for chest pain 1/12, EKGs and troponins negative. CT chest with minimal infiltrate. She reports that the dexamethasone made her very anxious and jittery. She reports that she has had elevated heart rates with minimal activity. She reports her HRs have been up to 120-142 just moving around her house.  Last dose of dexamethasone was Saturday. She is taking 1/4-1/2 of an ativan, which helps with some of her symptoms. She wears the pulse ox 24/7. She has some intermittent chest pains, which she has had in the past, occur at rest and with activity. Mostly only when her heart rate is high. She denies any shortness of breath      Objective     Vital Signs:   Vitals:    01/18/22 1321   BP: 109/88   Pulse: 91   SpO2: 97%   Weight: 69.9 kg (154 lb)   Height: 162.6 cm (64\")     Body mass index is 26.43 kg/m².    Virtual Visit Physical Exam  Physical Exam  Constitutional:       Appearance: Normal appearance.   HENT:      Head: Normocephalic.   Pulmonary:      Effort: Pulmonary effort is normal. No respiratory distress.   Neurological:      Mental Status: She is alert and oriented to person, place, and time.   Psychiatric:         Mood and Affect: Mood normal.         Behavior: Behavior normal.         Thought Content: " Thought content normal.              Result Review  Data Reviewed:{ Labs  Result Review  Imaging  Med Tab  Media :23}   Stress Test With Myocardial Perfusion One Day (02/28/2019 08:25)  Adult Transthoracic Echo Complete W/ Cont if Necessary Per Protocol (02/28/2019 09:56)  Mobile Cardiac Outpatient Telemetry (03/31/2020 09:35)  C-reactive Protein (01/14/2022 04:07)  CBC (No Diff) (01/14/2022 04:07)  Comprehensive Metabolic Panel (01/14/2022 04:07)    Recent hospitalization notes             Assessment and Plan {CC Problem List  Visit Diagnosis  ROS  Review (Popup)  Health Maintenance  Quality  BestPractice  Medications  SmartSets  SnapShot Encounters  Media :23}   1. Chest pain, unspecified type  Her symptoms are mostly atypical. Normal ischemic work up in 2019. Likely secondary to COVID 19 and anxiety. Advised her to call me if they persist and will repeat stress test    2. Palpitations  Likely secondary to recent steroids and COVID 19. If they persist, will repeat monitor. Normal heart monitor in 2020. Offered propranolol to see if this would also help her anxiety, but she defers further medication at this time    3. COVID-19  She does have some long haul symptoms but overall recovering well from a respiratory standpoint. Encouraged to continue to stay hydrated and balance rest and activity. If symptoms persist, will consider further cardiac evaluation once out of quarantine and fully recovered    4. Anxiety  Advised to follow up with PCP. She has a history of sensitivity to medications. Does not feel hydroxyzine helps     Will re-evaluate symptoms in 4 weeks    Follow Up {Instructions Charge Capture  Follow-up Communications :23}   Return in about 4 weeks (around 2/15/2022) for Video Visit, WM.    Patient was given instructions and counseling regarding her condition or for health maintenance advice. Please see specific information pulled into the AVS if appropriate.  Advised to call the Heart  and Valve Center with any questions, concerns, or worsening symptoms.    Dictated Utilizing Dragon Dictation

## 2022-01-19 ENCOUNTER — TELEPHONE (OUTPATIENT)
Dept: CARDIOLOGY | Facility: HOSPITAL | Age: 57
End: 2022-01-19

## 2022-01-19 NOTE — TELEPHONE ENCOUNTER
Pt called with concerns of her heart rate uzma in the 140's. Spoke to Tommy DAVIS and he spoke with pt.

## 2022-01-19 NOTE — PROGRESS NOTES
Spoke to patient on phone regarding elevated heart rates. Recent provider note from yesterday and previous cardiac testing reviewed. Patient reports heart rate 140s when sweeping the floor today and when washing her hair. Blood pressure at home approximately 90/65. She denies syncope. She is currently drinking approximately 2L of water per day.     Instructed patient to increase water intake to 3-4L of water daily.  Discussed reasonable to initiate propanolol as discussed at yesterday's visit, she deferred at this time. Will discuss possibility repeat monitor with SUSAN Murcia tomorrow.  Instructed patient if symptoms worsen or she develops syncope/worsening chest pain/dyspnea to seek medical care.

## 2022-01-20 ENCOUNTER — HOSPITAL ENCOUNTER (EMERGENCY)
Facility: HOSPITAL | Age: 57
Discharge: HOME | End: 2022-01-20
Attending: EMERGENCY MEDICINE | Admitting: EMERGENCY MEDICINE

## 2022-01-20 ENCOUNTER — APPOINTMENT (OUTPATIENT)
Dept: GENERAL RADIOLOGY | Facility: HOSPITAL | Age: 57
End: 2022-01-20

## 2022-01-20 ENCOUNTER — APPOINTMENT (OUTPATIENT)
Dept: CT IMAGING | Facility: HOSPITAL | Age: 57
End: 2022-01-20

## 2022-01-20 ENCOUNTER — DOCUMENTATION (OUTPATIENT)
Dept: CARDIOLOGY | Facility: HOSPITAL | Age: 57
End: 2022-01-20

## 2022-01-20 VITALS
DIASTOLIC BLOOD PRESSURE: 89 MMHG | SYSTOLIC BLOOD PRESSURE: 141 MMHG | WEIGHT: 154 LBS | HEIGHT: 64 IN | HEART RATE: 87 BPM | OXYGEN SATURATION: 95 % | RESPIRATION RATE: 19 BRPM | BODY MASS INDEX: 26.29 KG/M2 | TEMPERATURE: 98 F

## 2022-01-20 DIAGNOSIS — R05.8 POST-VIRAL COUGH SYNDROME: ICD-10-CM

## 2022-01-20 DIAGNOSIS — J01.90 ACUTE BACTERIAL SINUSITIS: ICD-10-CM

## 2022-01-20 DIAGNOSIS — B96.89 ACUTE BACTERIAL SINUSITIS: ICD-10-CM

## 2022-01-20 DIAGNOSIS — R00.2 PALPITATIONS: Primary | ICD-10-CM

## 2022-01-20 DIAGNOSIS — F51.04 PSYCHOPHYSIOLOGICAL INSOMNIA: ICD-10-CM

## 2022-01-20 DIAGNOSIS — R07.89 CHEST WALL PAIN: ICD-10-CM

## 2022-01-20 DIAGNOSIS — R07.9 CHEST PAIN, UNSPECIFIED TYPE: Primary | ICD-10-CM

## 2022-01-20 LAB
ALBUMIN SERPL-MCNC: 4.4 G/DL (ref 3.5–5.2)
ALBUMIN/GLOB SERPL: 1.5 G/DL
ALP SERPL-CCNC: 98 U/L (ref 39–117)
ALT SERPL W P-5'-P-CCNC: 17 U/L (ref 1–33)
ANION GAP SERPL CALCULATED.3IONS-SCNC: 13 MMOL/L (ref 5–15)
AST SERPL-CCNC: 16 U/L (ref 1–32)
BASOPHILS # BLD AUTO: 0.03 10*3/MM3 (ref 0–0.2)
BASOPHILS NFR BLD AUTO: 0.2 % (ref 0–1.5)
BILIRUB SERPL-MCNC: 0.5 MG/DL (ref 0–1.2)
BUN SERPL-MCNC: 9 MG/DL (ref 6–20)
BUN/CREAT SERPL: 11.4 (ref 7–25)
CALCIUM SPEC-SCNC: 9.2 MG/DL (ref 8.6–10.5)
CHLORIDE SERPL-SCNC: 104 MMOL/L (ref 98–107)
CO2 SERPL-SCNC: 22 MMOL/L (ref 22–29)
CREAT SERPL-MCNC: 0.79 MG/DL (ref 0.57–1)
DEPRECATED RDW RBC AUTO: 47.8 FL (ref 37–54)
EOSINOPHIL # BLD AUTO: 0.07 10*3/MM3 (ref 0–0.4)
EOSINOPHIL NFR BLD AUTO: 0.6 % (ref 0.3–6.2)
ERYTHROCYTE [DISTWIDTH] IN BLOOD BY AUTOMATED COUNT: 13.7 % (ref 12.3–15.4)
GFR SERPL CREATININE-BSD FRML MDRD: 76 ML/MIN/1.73
GLOBULIN UR ELPH-MCNC: 2.9 GM/DL
GLUCOSE SERPL-MCNC: 108 MG/DL (ref 65–99)
HCT VFR BLD AUTO: 43.4 % (ref 34–46.6)
HGB BLD-MCNC: 14.8 G/DL (ref 12–15.9)
HOLD SPECIMEN: NORMAL
IMM GRANULOCYTES # BLD AUTO: 0.05 10*3/MM3 (ref 0–0.05)
IMM GRANULOCYTES NFR BLD AUTO: 0.4 % (ref 0–0.5)
LIPASE SERPL-CCNC: 36 U/L (ref 13–60)
LYMPHOCYTES # BLD AUTO: 2.89 10*3/MM3 (ref 0.7–3.1)
LYMPHOCYTES NFR BLD AUTO: 23.6 % (ref 19.6–45.3)
MCH RBC QN AUTO: 32.7 PG (ref 26.6–33)
MCHC RBC AUTO-ENTMCNC: 34.1 G/DL (ref 31.5–35.7)
MCV RBC AUTO: 95.8 FL (ref 79–97)
MONOCYTES # BLD AUTO: 0.8 10*3/MM3 (ref 0.1–0.9)
MONOCYTES NFR BLD AUTO: 6.5 % (ref 5–12)
NEUTROPHILS NFR BLD AUTO: 68.7 % (ref 42.7–76)
NEUTROPHILS NFR BLD AUTO: 8.43 10*3/MM3 (ref 1.7–7)
NRBC BLD AUTO-RTO: 0 /100 WBC (ref 0–0.2)
NT-PROBNP SERPL-MCNC: 43.9 PG/ML (ref 0–900)
PLATELET # BLD AUTO: 262 10*3/MM3 (ref 140–450)
PMV BLD AUTO: 9.1 FL (ref 6–12)
POTASSIUM SERPL-SCNC: 3.7 MMOL/L (ref 3.5–5.2)
PROT SERPL-MCNC: 7.3 G/DL (ref 6–8.5)
QT INTERVAL: 360 MS
QT INTERVAL: 384 MS
QTC INTERVAL: 428 MS
QTC INTERVAL: 430 MS
RBC # BLD AUTO: 4.53 10*6/MM3 (ref 3.77–5.28)
SODIUM SERPL-SCNC: 139 MMOL/L (ref 136–145)
TROPONIN T SERPL-MCNC: <0.01 NG/ML (ref 0–0.03)
TROPONIN T SERPL-MCNC: <0.01 NG/ML (ref 0–0.03)
WBC NRBC COR # BLD: 12.27 10*3/MM3 (ref 3.4–10.8)
WHOLE BLOOD HOLD SPECIMEN: NORMAL
WHOLE BLOOD HOLD SPECIMEN: NORMAL

## 2022-01-20 PROCEDURE — 25010000002 LORAZEPAM PER 2 MG: Performed by: EMERGENCY MEDICINE

## 2022-01-20 PROCEDURE — 71045 X-RAY EXAM CHEST 1 VIEW: CPT

## 2022-01-20 PROCEDURE — 93005 ELECTROCARDIOGRAM TRACING: CPT | Performed by: EMERGENCY MEDICINE

## 2022-01-20 PROCEDURE — 96374 THER/PROPH/DIAG INJ IV PUSH: CPT

## 2022-01-20 PROCEDURE — 85025 COMPLETE CBC W/AUTO DIFF WBC: CPT | Performed by: EMERGENCY MEDICINE

## 2022-01-20 PROCEDURE — 83880 ASSAY OF NATRIURETIC PEPTIDE: CPT | Performed by: EMERGENCY MEDICINE

## 2022-01-20 PROCEDURE — 83690 ASSAY OF LIPASE: CPT | Performed by: EMERGENCY MEDICINE

## 2022-01-20 PROCEDURE — 84484 ASSAY OF TROPONIN QUANT: CPT | Performed by: EMERGENCY MEDICINE

## 2022-01-20 PROCEDURE — 70450 CT HEAD/BRAIN W/O DYE: CPT

## 2022-01-20 PROCEDURE — 99284 EMERGENCY DEPT VISIT MOD MDM: CPT

## 2022-01-20 PROCEDURE — 80053 COMPREHEN METABOLIC PANEL: CPT | Performed by: EMERGENCY MEDICINE

## 2022-01-20 RX ORDER — SODIUM CHLORIDE 0.9 % (FLUSH) 0.9 %
10 SYRINGE (ML) INJECTION AS NEEDED
Status: DISCONTINUED | OUTPATIENT
Start: 2022-01-20 | End: 2022-01-20 | Stop reason: HOSPADM

## 2022-01-20 RX ORDER — LORAZEPAM 0.5 MG/1
1 TABLET ORAL EVERY 12 HOURS PRN
Qty: 6 TABLET | Refills: 0 | Status: SHIPPED | OUTPATIENT
Start: 2022-01-20 | End: 2022-01-23

## 2022-01-20 RX ORDER — DOXYCYCLINE HYCLATE 100 MG/1
100 TABLET, DELAYED RELEASE ORAL 2 TIMES DAILY
Qty: 14 TABLET | Refills: 0 | Status: SHIPPED | OUTPATIENT
Start: 2022-01-20 | End: 2022-01-27

## 2022-01-20 RX ORDER — ASPIRIN 81 MG/1
324 TABLET, CHEWABLE ORAL ONCE
Status: COMPLETED | OUTPATIENT
Start: 2022-01-20 | End: 2022-01-20

## 2022-01-20 RX ORDER — LORAZEPAM 2 MG/ML
1 INJECTION INTRAMUSCULAR ONCE
Status: COMPLETED | OUTPATIENT
Start: 2022-01-20 | End: 2022-01-20

## 2022-01-20 RX ORDER — MECLIZINE HYDROCHLORIDE 25 MG/1
25 TABLET ORAL 3 TIMES DAILY PRN
Qty: 25 TABLET | Refills: 0 | Status: SHIPPED | OUTPATIENT
Start: 2022-01-20 | End: 2022-01-27

## 2022-01-20 RX ADMIN — ASPIRIN 324 MG: 81 TABLET, CHEWABLE ORAL at 18:48

## 2022-01-20 RX ADMIN — SODIUM CHLORIDE 1000 ML: 9 INJECTION, SOLUTION INTRAVENOUS at 18:46

## 2022-01-20 RX ADMIN — LORAZEPAM 1 MG: 2 INJECTION INTRAMUSCULAR at 18:47

## 2022-01-20 NOTE — TELEPHONE ENCOUNTER
I agree with what Tommy told her. I think it is because she is recovering from COVID. Options would be to give it a little more time, but if symptoms are worrisome, we could repeat another 30 day monitor so that I could monitor her HRs if she would like. Other option, would be to go ahead and start a beta blocker as needed, which we have discussed in the past.

## 2022-01-20 NOTE — PROGRESS NOTES
[facility] Heart Monitor Documentation    Lisa Hernandez Braxton  1965  [id number]  01/20/22    SUSAN Murcia    [] ZIO XT Patch  Model [id number] Prescribed for N/A Days    · Serial Number: (N + 9 Digits) N   · Apply-By Date on Box:   · USPS Tracking Number:   · USPS Tracking        [] [employer] BodyGuardian MINI PLUS Mobile Cardiac Telemetry  Model BGMINIPLUS Prescribed for 30 Days    · Serial Number: (BGM + 7 Digits) BGMPreventice to send  · Shipped-By Date on Box:   · UPS Tracking Number: [id number]  · UPS Tracking      [] [employer] Body[employer]ian MINI Holter Monitor  Model BGMINIEL Prescribed for N/A Days    · Serial Number: (7 Digits)   · Shipped-By Date on Box:   · UPS Tracking Number: [id number]  · UPS Tracking        This monitor was applied to the patient's chest and checked for proper functioning.  Ms. Lisa Limon was instructed in the proper use of this monitor.  She was given the opportunity to ask questions and left the office with the device 's instruction manual.    Carie Elizabeth MA, 13:36 EST, 01/20/22                  [facility] 8.[facility]

## 2022-01-21 ENCOUNTER — READMISSION MANAGEMENT (OUTPATIENT)
Dept: CALL CENTER | Facility: HOSPITAL | Age: 57
End: 2022-01-21

## 2022-01-21 NOTE — OUTREACH NOTE
COVID-19 Week 2 Survey      Responses   [facility] patient discharged from? [place]   Does the patient have one of the following disease processes/diagnoses(primary or secondary)? COVID-19   COVID-19 underlying condition? None   Call Number Call 1   COVID-19 Week 2: Call 1 attempt successful? Yes   Call start time [phone]   Call end time 0856   Discharge diagnosis Pneumonia due to COVID-19 virus   Medication alerts for this patient Dexamethasone was discontinued on 01/20/2022 per ED.   Meds reviewed with patient/caregiver? Yes   Is the patient having any side effects they believe may be caused by any medication additions or changes? No   Does the patient have all medications ordered at discharge? No   Nursing Interventions Nurse provided patient education   Prescription comments Spouse is to  meds today.   Medication comments Patient was seen again in ER 01/20/2022 and prescribed Doxycycline, Lorazepam, Meclizine   Does the patient have a primary care provider?  Yes   Comments regarding PCP Telehealth appt with PCP today   Does the patient have an appointment with their PCP or specialist within 7 days of discharge? Yes   Has the patient kept scheduled appointments due by today? Yes   Comments PCP called patient while on phone   Has home health visited the patient within 72 hours of discharge? N/A   Did the patient receive a copy of their discharge instructions? Yes   Did the patient receive a copy of COVID-19 specific instructions? Yes   Nursing interventions Reviewed instructions with patient   What is the patient's perception of their health status since discharge? Same   Does the patient have any of the following symptoms? Cough,  Shortness of breath,  Loss of taste/smell  [Patient states with exertion ( up to BR, kitchen, etc) HR increases to 140s. Patient also states this is 3rd week of feeling horrible]   Nursing Interventions Nurse provided patient education,  Advised patient to call provider   [Patient's provider called patient while on phone. Instructed to inform provider of HR and increased fatigue]   Pulse Ox monitoring Intermittent   Pulse Ox device source Hospital   O2 Sat comments 96-98% on RA,  drops to 93% with exertion   O2 Sat: education provided Sat levels,  Monitoring frequency,  When to seek care   Is the patient/caregiver able to teach back steps to recovery at home? Set small, achievable goals for return to baseline health,  Rest and rebuild strength, gradually increase activity,  Eat a well-balance diet  [Increase fluid intake]   Is the patient/caregiver able to teach back the hierarchy of who to call/visit for symptoms/problems? PCP, Specialist, Home health nurse, Urgent Care, ED, 911 Yes   COVID-19 call completed? Yes   Wrap up additional comments Patient provider called patient while on phone . Patient to discuss continued symptoms lasting > 21 days and especially elevated HR.          Jolene Grande RN

## 2022-01-21 NOTE — ED PROVIDER NOTES
EMERGENCY DEPARTMENT ENCOUNTER    Pt Name: Lisa Limon  MRN: [id number]  Pt :   1965  Room Number:    Date of encounter:  2022  PCP: Natasha Torres APRN  ED Provider: Juan Goldman MD    Historian: Patient      HPI:  Chief Complaint: Cough and discomfort in the chest        Context: Lisa Limon is a 56 y.o. female who presents to the ED c/o continued cough and congestion with soreness in the chest wall with recent COVID-19 illness. She has had the illness for 21 days, and is continued with a productive cough. Was seen and evaluated previously including hospital observation, here with some continued concerns of discomfort in the chest minute namely with coughing, with production of sputum. Also has some headache and dizziness. She has some nasal discharge which has been green.    PAST MEDICAL HISTORY  Past Medical History:   Diagnosis Date   • Acid reflux    • Anxiety    • Colon polyps    • Depression    • Gall stones    • Ovarian cyst    • Pancreatitis    • Ulcer    • UTI (urinary tract infection)          PAST SURGICAL HISTORY  Past Surgical History:   Procedure Laterality Date   • CHOLECYSTECTOMY     • HYSTERECTOMY     • TUBAL ABDOMINAL LIGATION           FAMILY HISTORY  Family History   Problem Relation Age of Onset   • Migraines Mother    • Heart attack Father    • Diabetes Paternal Aunt    • Diabetes Paternal Uncle    • No Known Problems Sister    • Stroke Brother    • No Known Problems Maternal Grandmother    • No Known Problems Maternal Grandfather    • No Known Problems Paternal Grandmother    • No Known Problems Paternal Grandfather    • Diabetes Other          SOCIAL HISTORY  Social History     Socioeconomic History   • Marital status:    Tobacco Use   • Smoking status: Current Every Day Smoker     Packs/day: 0.25     Years: 36.00     Pack years: 9.00     Types: Cigarettes     Start date:    • Smokeless tobacco: Never Used   Substance and  Sexual Activity   • Alcohol use: No   • Drug use: Never   • Sexual activity: Defer         ALLERGIES  Iodinated diagnostic agents, Zithromax [azithromycin], Amoxicillin, Hydrocodone-acetaminophen, and Tetanus toxoids        REVIEW OF SYSTEMS  Review of Systems     General, no fevers  HEENT, positive for headache positive for nasal discharge, ear fullness and congestion  CVS: Chest pain with a cough, also soreness to the right side of the chest, which is tender to palpation    All systems reviewed and negative except for those discussed in HPI.       PHYSICAL EXAM    I have reviewed the triage vital signs and nursing notes.    ED Triage Vitals [01/20/22 1805]   Temp Heart Rate Resp BP SpO2   98 °F (36.7 °C) 97 19 150/91 98 %      Temp src Heart Rate Source Patient Position BP Location FiO2 (%)   Oral Monitor Sitting Right arm --       Physical Exam  GENERAL:   Appears alert, anxious  HENT: Nares patent. Nasal congestion  EYES: No scleral icterus. Conjunctive are clear  CV: Regular rhythm, regular rate. No murmurs  RESPIRATORY: Normal effort.  No audible wheezes, rales or rhonchi. Cough noted, chest wall tenderness on the right side.  ABDOMEN: Soft, nontender nondistended  MUSCULOSKELETAL: No deformities. No edema of the lower extremities  NEURO: Alert, moves all extremities, follows commands.  SKIN: Warm, dry, no rash visualized.        LAB RESULTS      If labs were ordered, I independently reviewed the results.  CBC and serial troponins are negative.      RADIOLOGY  CT Head Without Contrast    Result Date: 1/20/2022  CT Head WO HISTORY: Severe headache tonight with vertigo TECHNIQUE: Axial unenhanced head CT. Radiation dose reduction techniques included automated exposure control or exposure modulation based on body size. Count of known CT and cardiac nuc med studies performed in previous 12 months: 1. Time of scan: 7:27 PM COMPARISON: None. FINDINGS: No intracranial hemorrhage, mass, or infarct. No hydrocephalus or  extra-axial fluid collection. Brain parenchymal density is normal. There is soft tissue density partially filling the left maxillary sinus. There is extensive soft tissue density throughout the ethmoid sinuses and sphenoid sinus and frontal sinus is clear.     Left maxillary, bilateral ethmoid and sphenoid sinusitis Otherwise normal. Signer Name: Karl Merrill MD  Signed: 1/20/2022 8:01 PM  Workstation Name: [facility]  [facility]    XR Chest 1 View    Result Date: 1/20/2022  CR Chest 1 Vw INDICATION: Central chest pain radiating to the right side for 2 days COMPARISON:  1/12/2022 FINDINGS: Portable AP view(s) of the chest.  The heart and mediastinal contours are normal. The lungs are clear. No pneumothorax or pleural effusion.     No acute cardiopulmonary findings. Signer Name: Karl Merrill MD  Signed: 1/20/2022 7:30 PM  [facility]station [facility]: [facility]  [facility]      I ordered and reviewed the above noted radiographic studies.      I viewed images of chest x-ray which showed no acute disease per my independent interpretation.    See radiologist's dictation for official interpretation.        PROCEDURES    Procedures    ECG 12 Lead         ECG 12 Lead   Final Result   Test Reason : chest pain   Blood Pressure :   */*   mmHG   Vent. Rate :  86 BPM     Atrial Rate :  86 BPM      P-R Int : 116 ms          QRS Dur :  74 ms       QT Int : 360 ms       P-R-T Axes :  60  36  55 degrees      QTc Int : 430 ms      Normal sinus rhythm   Nonspecific ST abnormality   No ischemic change   When compared with ECG of 12-JAN-2022 19:16, (Unconfirmed)   No significant change was found   Confirmed by AMY MOCTEZUMA ([id number]) on 1/20/2022 7:48:29 PM      Referred By: SHINE           Confirmed By: AMY MOCTEZUMA        Repeat EKG is unchanged.  MEDICATIONS GIVEN IN ER    Medications   sodium chloride 0.9 % flush 10 mL (has no administration in time range)   aspirin chewable tablet 324 mg  (324 mg Oral Given 1/20/22 1848)   sodium chloride 0.9 % bolus 1,000 mL (0 mL Intravenous Stopped 1/20/22 2048)   LORazepam (ATIVAN) injection 1 mg (1 mg Intravenous Given 1/20/22 1847)         PROGRESS, DATA ANALYSIS, CONSULTS, AND MEDICAL DECISION MAKING    All labs have been independently reviewed by me.  All radiology studies have been reviewed by me and the radiologist dictating the report.   EKG's have been independently viewed and interpreted by me.      Differential diagnoses: Pneumonia, sinusitis, headache with dizziness and vertigo was present. Patient describes severe headache so imaging was done, after further clinical evaluation, and interpretation of the normal CT of the head, there is no findings of subarachnoid hemorrhage, stroke, or other acute neurologic condition.    I did review the prior presentation including elevated D-dimer and VQ scan's. Find the patient's presentation and clinical labs at that time consistent with COVID viral illness, her clinical presentation today is also consistent with this, there is no findings that would indicate any thromboembolic disease. At this time repeat nuclear imaging, is not found to be helpful, the patient does have a severe anaphylactic reaction in the past with IV contrast, and the risk of this outweigh any benefits, as the patient does not show any signs here of pulmonary embolism or need for further evaluation or admission. I have discussed a plan of observation fluids and continue monitoring of her symptoms and symptomatic treatment, I will also treat acute sinus disease based on her nasal discharge and headache. She is happy and agrees with this plan, and will agree to return if any indication of other processes going on or any worsening                 AS OF 21:04 EST VITALS:    BP - 141/89  HR - 87  TEMP - 98 °F (36.7 °C) (Oral)  O2 SATS - 95%                  DIAGNOSIS  Final diagnoses:   Chest pain, unspecified type   Chest wall pain   Post-viral  cough syndrome   Acute bacterial sinusitis   Psychophysiological insomnia         DISPOSITION  Home    DISCHARGE    Patient discharged in stable condition.    Reviewed implications of results, diagnosis, meds, responsibility to follow up, warning signs and symptoms of possible worsening, potential complications and reasons to return to ER.    Patient/Family voiced understanding of above instructions.    Discussed plan for discharge, as there is no emergent indication for admission.  Pt/family is agreeable and understands need for follow up and possible repeat testing.  Pt/family is aware that discharge does not mean that nothing is wrong but that it indicates no emergency is currently present that requires admission and they must continue care with follow-up as given below or with a physician of their choice.     FOLLOW-UP               Juan Goldman MD  01/20/22 4736

## 2022-01-24 ENCOUNTER — READMISSION MANAGEMENT (OUTPATIENT)
Dept: CALL CENTER | Facility: HOSPITAL | Age: 57
End: 2022-01-24

## 2022-01-24 NOTE — OUTREACH NOTE
COVID-19 Week 2 Survey      Responses   [facility] patient discharged from? [place]   Does the patient have one of the following disease processes/diagnoses(primary or secondary)? COVID-19   COVID-19 underlying condition? None   Call Number Call 2   COVID-19 Week 2: Call 1 attempt successful? Yes   Call end time 1250   Discharge diagnosis Pneumonia due to COVID-19 virus   Meds reviewed with patient/caregiver? Yes   Is the patient having any side effects they believe may be caused by any medication additions or changes? No   Does the patient have all medications ordered at discharge? Yes   Is the patient taking all medications as directed (includes completed medication regime)? Yes   Does the patient have a primary care provider?  Yes   Does the patient have an appointment with their PCP or specialist within 7 days of discharge? Yes   Has the patient kept scheduled appointments due by today? Yes   Has all DME been delivered? Yes   Psychosocial issues? No   Did the patient receive a copy of their discharge instructions? Yes   Did the patient receive a copy of COVID-19 specific instructions? Yes   Nursing interventions Reviewed instructions with patient   What is the patient's perception of their health status since discharge? Improving   Does the patient have any of the following symptoms? Cough,  Loss of taste/smell   Nursing Interventions Nurse provided patient education   Pulse Ox monitoring Intermittent   Pulse Ox device source Hospital   O2 Sat comments Average has been around 94% but has been anywhere from 99-90%   O2 Sat: education provided Sat levels,  When to seek care   Is the patient/caregiver able to teach back steps to recovery at home? Set small, achievable goals for return to baseline health,  Rest and rebuild strength, gradually increase activity,  Make a list of questions for provider's appointment,  Eat a well-balance diet   Is the patient/caregiver able to teach back the hierarchy of who to  "call/visit for symptoms/problems? PCP, Specialist, Home health nurse, Urgent Care, ED, 911 Yes   COVID-19 call completed? Yes   Wrap up additional comments States she is doing \"ok\".  Has cleaned highly used surfaces, washed bedding and changed toothbrush.            Alissa Sharma LPN  "

## 2022-01-25 LAB
QT INTERVAL: 394 MS
QT INTERVAL: 396 MS
QTC INTERVAL: 448 MS
QTC INTERVAL: 465 MS

## 2022-01-26 ENCOUNTER — NURSE TRIAGE (OUTPATIENT)
Dept: CALL CENTER | Facility: HOSPITAL | Age: 57
End: 2022-01-26

## 2022-01-26 NOTE — TELEPHONE ENCOUNTER
"Feeling lightheaded, 3-4 days, BP is low some days today 84/64, , 90/72 now 101/76, on doxycycline , is having reaction everytime takes it, itching moderately to severe, on legs and buttocks, not taking  bp meds of any kind ,HR runs  heart monitor is to be put on, received it thru mail, , she is afraid will not put it on correctly, will see PCP tomorrow at 14:15,   but Cardiology will not let me come into office till 21 days past COVID test and I tested positive on 01/09/2022, BP not low everyday , If BP is low,usually eats pickles or olives and later BP will come back up. She states is feeling lightheaded at times, but at this time, when BP is low fells like might pass out, told her if this happens needs to be seen in Er, otherwise keep appt. For tomorrow, hold Doxycycline till see  Tomorrow. She asked for soft transfer to office to be put on  Waiting list for cancellations for today.     Reason for Disposition  • [1] Fall in systolic BP > 20 mm Hg from normal AND [2] dizzy, lightheaded, or weak    Additional Information  • Negative: Started suddenly after an allergic medicine, an allergic food, or bee sting  • Negative: Shock suspected (e.g., cold/pale/clammy skin, too weak to stand, low BP, rapid pulse)  • Negative: Difficult to awaken or acting confused (e.g., disoriented, slurred speech)  • Negative: Fainted  • Negative: [1] Systolic BP < 90 AND [2] dizzy, lightheaded, or weak  • Negative: Chest pain  • Negative: Bleeding (e.g., vomiting blood, rectal bleeding or tarry stools, severe vaginal bleeding)(Exception: fainted from sight of small amount of blood; small cut or abrasion)  • Negative: Extra heart beats or heart is beating fast  (i.e., \"palpitations\")  • Negative: Sounds like a life-threatening emergency to the triager  • Negative: [1] Systolic BP < 80 AND [2] NOT dizzy, lightheaded or weak  • Negative: Abdominal pain  • Negative: Fever > 100.4 F (38.0 C)  • Negative: Major surgery in the past " "month  • Negative: [1] Drinking very little AND [2] dehydration suspected (e.g., no urine > 12 hours, very dry mouth, very lightheaded)  • Negative: Patient sounds very sick or weak to the triager  • Negative: [1] Systolic BP < 90 AND [2] NOT dizzy, lightheaded or weak  • Negative: [1] Systolic BP  AND [2] taking blood pressure medications AND [3] dizzy, lightheaded or weak    Answer Assessment - Initial Assessment Questions  1. BLOOD PRESSURE: \"What is the blood pressure?\" \"Did you take at least two measurements 5 minutes apart?\"      84/64-90/72  2. ONSET: \"When did you take your blood pressure?\"    Now 111/83   3. HOW: \"How did you obtain the blood pressure?\" (e.g., visiting nurse, automatic home BP monitor)    Automatic cuff  4. HISTORY: \"Do you have a history of low blood pressure?\" \"What is your blood pressure normally?\"     no  5. MEDICATIONS: \"Are you taking any medications for blood pressure?\" If Yes, ask: \"Have they been changed recently?\"      no  6. PULSE RATE: \"Do you know what your pulse rate is?\"      100  7. OTHER SYMPTOMS: \"Have you been sick recently?\" \"Have you had a recent injury?\"    Have had COVID in past weeks  8. PREGNANCY: \"Is there any chance you are pregnant?\" \"When was your last menstrual period?\"      no    Protocols used: BLOOD PRESSURE - LOW-ADULT-AH      "

## 2022-01-27 ENCOUNTER — OFFICE VISIT (OUTPATIENT)
Dept: INTERNAL MEDICINE | Facility: CLINIC | Age: 57
End: 2022-01-27

## 2022-01-27 VITALS
WEIGHT: 148.8 LBS | TEMPERATURE: 97.7 F | RESPIRATION RATE: 28 BRPM | OXYGEN SATURATION: 99 % | SYSTOLIC BLOOD PRESSURE: 124 MMHG | BODY MASS INDEX: 25.4 KG/M2 | HEART RATE: 90 BPM | HEIGHT: 64 IN | DIASTOLIC BLOOD PRESSURE: 76 MMHG

## 2022-01-27 DIAGNOSIS — U07.1 COVID: Primary | ICD-10-CM

## 2022-01-27 DIAGNOSIS — J01.90 ACUTE SINUSITIS, RECURRENCE NOT SPECIFIED, UNSPECIFIED LOCATION: ICD-10-CM

## 2022-01-27 DIAGNOSIS — G47.33 OBSTRUCTIVE SLEEP APNEA: ICD-10-CM

## 2022-01-27 DIAGNOSIS — F41.9 ANXIETY: ICD-10-CM

## 2022-01-27 PROCEDURE — 99214 OFFICE O/P EST MOD 30 MIN: CPT | Performed by: NURSE PRACTITIONER

## 2022-01-27 RX ORDER — NYSTATIN AND TRIAMCINOLONE ACETONIDE 100000; 1 [USP'U]/G; MG/G
1 OINTMENT TOPICAL 2 TIMES DAILY
Qty: 15 G | Refills: 1 | OUTPATIENT
Start: 2022-01-27 | End: 2022-06-28

## 2022-01-27 RX ORDER — FLUCONAZOLE 150 MG/1
TABLET ORAL
Qty: 3 TABLET | Refills: 0 | OUTPATIENT
Start: 2022-01-27 | End: 2022-06-28

## 2022-01-27 RX ORDER — AZITHROMYCIN 250 MG/1
TABLET, FILM COATED ORAL
Qty: 6 TABLET | Refills: 0 | OUTPATIENT
Start: 2022-01-27 | End: 2022-06-28

## 2022-01-27 NOTE — PROGRESS NOTES
New Patient Office Visit      Patient Name: Lisa Hernandez  : 1965   MRN: [id number]     Chief Complaint:    Chief Complaint   Patient presents with   • Rash     New patient. had covid the beginning of the month    • Rapid Heart Rate   • Hypotension   • Dizziness       History of Present Illness: Lisa Hernandez is a 56 y.o. female presents to clinic today to establish care.   Past Medical History:   Diagnosis Date   • Acid reflux    • Anxiety    • Colon polyps    • Depression    • Gall stones    • Ovarian cyst    • Pancreatitis    • Ulcer    • UTI (urinary tract infection)        Presents for bilateral maxillary pressure and headache.  Patient was diagnosed with COVID  2022.  Her symptoms included sore throat, fatigue, body aches, fever, loss of taste, diarrhea, shortness of breath, rash and chest pain.  She had been evaluated in the ED several times for this as well as chest pain.  EKGs and troponins were negative and CT with minimal infiltrate.  She was given dexamethasone and did not tolerate it well due to anxiety and feeling jittery.  She has been evaluated by cardiology for her chest pain and high heart rate.  Days she feels somewhat better.  However she is concerned about the intermittent rash that she described as itchy patches on thighs and buttocks.  The rash is completely gone today.     Smoking  9 pack year history; smoking 5-6 cigarettes daily    CHANTE:  Compliant with CPAP.     Anxiety:  History of anxiety/depression but has not tolerated Paxil or Zoloft in the past.  Paxil caused breast milk and zoloft caused tremors.   She is reluctant to start medications due to her history of reactions.     Subjective     Review of System: Review of Systems   Constitutional: Negative for chills, diaphoresis, fatigue and fever.   HENT: Negative for congestion, ear pain, mouth sores, postnasal drip, sinus pressure, sinus pain, sneezing and sore throat.    Eyes: Negative for visual disturbance.    Respiratory: Positive for cough. Negative for chest tightness, shortness of breath and wheezing.    Cardiovascular: Negative for chest pain.   Gastrointestinal: Negative for abdominal pain, diarrhea, nausea and vomiting.   Musculoskeletal: Negative for arthralgias and myalgias.   Skin: Positive for rash (intermittent ). Negative for color change.   Neurological: Negative for dizziness, weakness and headaches.   Hematological: Negative for adenopathy.   Psychiatric/Behavioral: Negative for dysphoric mood. The patient is not nervous/anxious.         Past Medical History:   Past Medical History:   Diagnosis Date   • Acid reflux    • Anxiety    • Colon polyps    • Depression    • Gall stones    • Ovarian cyst    • Pancreatitis    • Ulcer    • UTI (urinary tract infection)        Past Surgical History:   Past Surgical History:   Procedure Laterality Date   • CHOLECYSTECTOMY  2007   • HYSTERECTOMY  2010   • TUBAL ABDOMINAL LIGATION  1994       Family History:   Family History   Problem Relation Age of Onset   • Migraines Mother    • Heart attack Father    • Diabetes Paternal Aunt    • Diabetes Paternal Uncle    • No Known Problems Sister    • Stroke Brother    • No Known Problems Maternal Grandmother    • No Known Problems Maternal Grandfather    • No Known Problems Paternal Grandmother    • No Known Problems Paternal Grandfather    • Diabetes Other        Social History:   Social History     Socioeconomic History   • Marital status:    Tobacco Use   • Smoking status: Current Every Day Smoker     Packs/day: 0.25     Years: 36.00     Pack years: 9.00     Types: Cigarettes     Start date: 1985   • Smokeless tobacco: Never Used   Substance and Sexual Activity   • Alcohol use: No   • Drug use: Never   • Sexual activity: Defer       Medications:     Current Outpatient Medications:   •  azithromycin (Zithromax Z-Delvin) 250 MG tablet, Take 2 tablets by mouth on day 1, then 1 tablet daily on days 2-5, Disp: 6 tablet, Rfl:  "0  •  fluconazole (Diflucan) 150 MG tablet, Take one tablet every 72 hours, Disp: 3 tablet, Rfl: 0  •  nystatin-triamcinolone (MYCOLOG) 578763-2.1 UNIT/GM-% ointment, Apply 1 application topically to the appropriate area as directed 2 (Two) Times a Day., Disp: 15 g, Rfl: 1    Allergies:   Allergies   Allergen Reactions   • Iodinated Diagnostic Agents Anaphylaxis   • Amoxicillin Rash and Other (See Comments)     Rash and yeast infection   • Hydrocodone-Acetaminophen Rash   • Tetanus Toxoids Rash and GI Intolerance       Objective     Physical Exam:   Vital Signs:   Vitals:    01/27/22 1425   BP: 124/76   BP Location: Right arm   Patient Position: Sitting   Cuff Size: Adult   Pulse: 90   Resp: 28   Temp: 97.7 °F (36.5 °C)   TempSrc: Infrared   SpO2: 99%   Weight: 67.5 kg (148 lb 12.8 oz)   Height: 162.6 cm (64\")   PainSc: 0-No pain     Body mass index is 25.54 kg/m². Patient's Body mass index is 25.54 kg/m². indicating that she is overweight (BMI 25-29.9). Obesity-related health conditions include the following: obstructive sleep apnea. Obesity is unchanged. BMI is is above average; BMI management plan is completed. We discussed portion control and increasing exercise..      Physical Exam  Constitutional:       General: She is not in acute distress.     Appearance: She is not ill-appearing.   HENT:      Head: Normocephalic.   Cardiovascular:      Rate and Rhythm: Normal rate and regular rhythm.      Heart sounds: Normal heart sounds. No murmur heard.      Pulmonary:      Breath sounds: Normal breath sounds.   Abdominal:      General: Bowel sounds are normal.      Tenderness: There is no abdominal tenderness.   Musculoskeletal:         General: No swelling or tenderness. Normal range of motion.   Lymphadenopathy:      Cervical: No cervical adenopathy.   Skin:     General: Skin is warm and dry.   Neurological:      General: No focal deficit present.      Mental Status: She is oriented to person, place, and time. "   Psychiatric:         Mood and Affect: Mood normal.         Assessment / Plan      Assessment/Plan:   Diagnoses and all orders for this visit:    1. COVID (Primary)    2. Acute sinusitis, recurrence not specified, unspecified location    3. Obstructive sleep apnea    4. Anxiety  Will continue to monitor    Other orders  -     azithromycin (Zithromax Z-Delvin) 250 MG tablet; Take 2 tablets by mouth on day 1, then 1 tablet daily on days 2-5  Dispense: 6 tablet; Refill: 0  -     fluconazole (Diflucan) 150 MG tablet; Take one tablet every 72 hours  Dispense: 3 tablet; Refill: 0  -     nystatin-triamcinolone (MYCOLOG) 452625-8.1 UNIT/GM-% ointment; Apply 1 application topically to the appropriate area as directed 2 (Two) Times a Day.  Dispense: 15 g; Refill: 1       Offered reassurance for symptoms.       Follow Up:   Return in about 4 weeks (around 2/24/2022) for Annual.  Sooner if no improvement or worsening.   SUSAN Beauchamp  [facility]

## 2022-01-31 ENCOUNTER — TELEPHONE (OUTPATIENT)
Dept: INTERNAL MEDICINE | Facility: CLINIC | Age: 57
End: 2022-01-31

## 2022-01-31 ENCOUNTER — READMISSION MANAGEMENT (OUTPATIENT)
Dept: CALL CENTER | Facility: HOSPITAL | Age: 57
End: 2022-01-31

## 2022-01-31 NOTE — OUTREACH NOTE
COVID-19 Week 3 Survey      Responses   [facility] patient discharged from? [place]   Does the patient have one of the following disease processes/diagnoses(primary or secondary)? COVID-19   COVID-19 underlying condition? None   Call Number Call 1   COVID-19 Week 3: Call 1 attempt successful? No   Discharge diagnosis Pneumonia due to COVID-19 virus          Kavon Gallagher RN

## 2022-01-31 NOTE — TELEPHONE ENCOUNTER
Caller: Lisa Hernandez    Relationship: Self    Best call back number: [phone]    What medication are you requesting: SOMETHING FOR ORAL THRUSH    What are your current symptoms: ORAL THRUSH- WHITE COATING ON THE TONGUE, SORE THROAT     How long have you been experiencing symptoms: 2 DAYS     Have you had these symptoms before:    [x] Yes  [] No    Have you been treated for these symptoms before:   [x] Yes  [] No    If a prescription is needed, what is your preferred pharmacy and phone number: [address] [phone] [place] [phone]      Additional notes: PATIENT WAS PLACED ON ANTIBIOTICS LAST WEEK AND NOW SHE HAS ORAL THRUSH

## 2022-02-01 ENCOUNTER — TELEPHONE (OUTPATIENT)
Dept: INTERNAL MEDICINE | Facility: CLINIC | Age: 57
End: 2022-02-01

## 2022-02-04 NOTE — TELEPHONE ENCOUNTER
Left voice mail message for Pt to CB for message. Office number given.    Hub okay to relay message  I sent in nystatin. Follow-up if no improvement in symptoms

## 2022-02-28 NOTE — ED PROVIDER NOTES
Patient  Is scheduled for this Thursday March 3rd at 10AM.   Subjective   Lisa Hernandez is a 54 year old female complaining of chest pain. The patient reports that she began having left chest pain this morning while getting ready for work. This pain radiates to her left neck and ear as well as to her arm. She denies any worsening of her pain with movement or deep breaths. 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 The patient notes that she is currently feeling fatigued. She has a medical history of sleep apnea. The patient reports having both a normal myocardial perfusion study and normal echocardiogram in January 2019. She also has a surgical history of hysterectomy but denies a history of cardiac catheterization. She denies taking any hormones regularly, but did take two Aspirin this morning. Her cardiologist is Dr. Elver Pace. Her primary care physician is Dr. Wojciech Encarnacion. The patient smokes less than one pack per day. Her  reports that the patient has recently been experiencing consistent job-related stress and struggles with feelings of anxiety. There are no other acute complaints at this time.      History provided by:  Patient  Chest Pain   Pain location:  L chest  Pain quality: pressure    Pain radiates to:  Neck and L arm (Left ear.)  Pain severity:  Moderate  Onset quality:  Sudden  Chronicity:  New  Associated symptoms: fatigue    Risk factors: smoking    Risk factors: no birth control and not obese        Review of Systems   Constitutional: Positive for fatigue.   Cardiovascular: Positive for chest pain.   Musculoskeletal: Positive for neck pain (Left neck pain up to her ear.).   All other systems reviewed and are negative.      Past Medical History:   Diagnosis Date   • Acid reflux    • Colon polyps    • Depression    • Gall stones    • Ovarian cyst    • Pancreatitis    • Ulcer    • UTI (urinary tract infection)        Allergies   Allergen Reactions   • Other  Anaphylaxis     IV Contrast   • Amoxicillin Rash and Other (See Comments)     Rash and yeast infection   • Hydrocodone-Acetaminophen Rash   • Tetanus Toxoids Rash and GI Intolerance       Past Surgical History:   Procedure Laterality Date   • CHOLECYSTECTOMY  2007   • HYSTERECTOMY  2010   • TUBAL ABDOMINAL LIGATION  1994       Family History   Problem Relation Age of Onset   • Migraines Mother    • Heart attack Father    • Diabetes Paternal Aunt    • Diabetes Paternal Uncle        Social History     Socioeconomic History   • Marital status:      Spouse name: Not on file   • Number of children: Not on file   • Years of education: Not on file   • Highest education level: Not on file   Tobacco Use   • Smoking status: Current Every Day Smoker   • Smokeless tobacco: Never Used   Substance and Sexual Activity   • Alcohol use: No   • Drug use: Yes     Types: Hydromorphone   • Sexual activity: Defer         Objective   Physical Exam   Constitutional: She is oriented to person, place, and time. She appears well-developed and well-nourished.   HENT:   Head: Normocephalic and atraumatic.   Nose: Nose normal.   Eyes: Conjunctivae are normal. No scleral icterus.   Neck: Normal range of motion. Neck supple.   Cardiovascular: Normal rate and regular rhythm.   No murmur heard.  Pulmonary/Chest: Effort normal and breath sounds normal. No respiratory distress.   Abdominal: Soft.   Musculoskeletal: Normal range of motion.   Neurological: She is alert and oriented to person, place, and time.   Skin: Skin is warm and dry.   Psychiatric: She has a normal mood and affect. Her behavior is normal.   Nursing note and vitals reviewed.      Procedures         ED Course  ED Course as of Dec 12 1802   Thu Dec 12, 2019   8096 I had a very long sitdown conversation with the patient and her  at the bedside.  I reviewed HER-2 negative troponins as well as HER-2 negative EKGs.  I will give her referral to our chest pain clinic.  I did  discuss getting a CTA of her chest for further evaluation for aneurysm and pulmonary embolism.  At this point they declined.  I do think that is reasonable considering she is not hypoxic or tachycardic and she is not having any pleuritic chest pain.  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  I will give her several days off work as well has follow-up with the chest pain clinic.  All well aware the signs symptoms worse condition.    [JM]      ED Course User Index  [JM] Kevyn Palacio APRN     Recent Results (from the past 24 hour(s))   Troponin    Collection Time: 12/12/19  1:07 PM   Result Value Ref Range    Troponin T <0.010 0.000 - 0.030 ng/mL   Comprehensive Metabolic Panel    Collection Time: 12/12/19  1:07 PM   Result Value Ref Range    Glucose 140 (H) 65 - 99 mg/dL    BUN 12 6 - 20 mg/dL    Creatinine 0.79 0.57 - 1.00 mg/dL    Sodium 141 136 - 145 mmol/L    Potassium 3.7 3.5 - 5.2 mmol/L    Chloride 105 98 - 107 mmol/L    CO2 23.0 22.0 - 29.0 mmol/L    Calcium 9.6 8.6 - 10.5 mg/dL    Total Protein 7.6 6.0 - 8.5 g/dL    Albumin 4.50 3.50 - 5.20 g/dL    ALT (SGPT) 15 1 - 33 U/L    AST (SGOT) 18 1 - 32 U/L    Alkaline Phosphatase 115 39 - 117 U/L    Total Bilirubin 0.3 0.2 - 1.2 mg/dL    eGFR Non African Amer 76 >60 mL/min/1.73    Globulin 3.1 gm/dL    A/G Ratio 1.5 g/dL    BUN/Creatinine Ratio 15.2 7.0 - 25.0    Anion Gap 13.0 5.0 - 15.0 mmol/L   Lipase    Collection Time: 12/12/19  1:07 PM   Result Value Ref Range    Lipase 49 13 - 60 U/L   BNP    Collection Time: 12/12/19  1:07 PM   Result Value Ref Range    proBNP 86.6 5.0 - 900.0 pg/mL   Light Blue Top    Collection Time: 12/12/19  1:07 PM   Result Value Ref Range    Extra Tube hold for add-on    Green Top (Gel)    Collection Time: 12/12/19  1:07 PM   Result Value Ref Range    Extra Tube Hold for add-ons.    Lavender Top    Collection Time:  12/12/19  1:07 PM   Result Value Ref Range    Extra Tube hold for add-on    Gold Top - SST    Collection Time: 12/12/19  1:07 PM   Result Value Ref Range    Extra Tube Hold for add-ons.    CBC Auto Differential    Collection Time: 12/12/19  1:07 PM   Result Value Ref Range    WBC 8.73 3.40 - 10.80 10*3/mm3    RBC 4.60 3.77 - 5.28 10*6/mm3    Hemoglobin 14.8 12.0 - 15.9 g/dL    Hematocrit 45.9 34.0 - 46.6 %    MCV 99.8 (H) 79.0 - 97.0 fL    MCH 32.2 26.6 - 33.0 pg    MCHC 32.2 31.5 - 35.7 g/dL    RDW 13.2 12.3 - 15.4 %    RDW-SD 48.4 37.0 - 54.0 fl    MPV 9.6 6.0 - 12.0 fL    Platelets 228 140 - 450 10*3/mm3    Neutrophil % 69.1 42.7 - 76.0 %    Lymphocyte % 25.0 19.6 - 45.3 %    Monocyte % 4.5 (L) 5.0 - 12.0 %    Eosinophil % 0.7 0.3 - 6.2 %    Basophil % 0.6 0.0 - 1.5 %    Immature Grans % 0.1 0.0 - 0.5 %    Neutrophils, Absolute 6.04 1.70 - 7.00 10*3/mm3    Lymphocytes, Absolute 2.18 0.70 - 3.10 10*3/mm3    Monocytes, Absolute 0.39 0.10 - 0.90 10*3/mm3    Eosinophils, Absolute 0.06 0.00 - 0.40 10*3/mm3    Basophils, Absolute 0.05 0.00 - 0.20 10*3/mm3    Immature Grans, Absolute 0.01 0.00 - 0.05 10*3/mm3    nRBC 0.0 0.0 - 0.2 /100 WBC   Troponin    Collection Time: 12/12/19  3:39 PM   Result Value Ref Range    Troponin T <0.010 0.000 - 0.030 ng/mL     Note: In addition to lab results from this visit, the labs listed above may include labs taken at another facility or during a different encounter within the last 24 hours. Please correlate lab times with ED admission and discharge times for further clarification of the services performed during this visit.    XR Chest 2 View   Preliminary Result   No evidence of active airspace disease.       DICTATED:   12/12/2019   EDITED/ls :   12/12/2019             Vitals:    12/12/19 1715 12/12/19 1730 12/12/19 1745 12/12/19 1800   BP:  113/70     BP Location:       Patient Position:       Pulse: 59 63 58 65   Resp:       Temp:       TempSrc:       SpO2: 97% 97% 99% 98%    Weight:       Height:         Medications   sodium chloride 0.9 % flush 10 mL (has no administration in time range)     ECG/EMG Results (last 24 hours)     Procedure Component Value Units Date/Time    ECG 12 Lead [[id number]] Collected:  12/12/19 1302     Updated:  12/12/19 1303    ECG 12 Lead [[id number]] Collected:  12/12/19 1543     Updated:  12/12/19 1545        ECG 12 Lead         ECG 12 Lead                           MDM    Final diagnoses:   Acute chest pain       Documentation assistance provided by kely Reece.  Information recorded by the scribe was done at my direction and has been verified and validated by me.     Estrellita Reece  12/12/19 1726       Kevyn Palacio APRN  12/12/19 1805

## 2022-03-31 PROCEDURE — 93272 ECG/REVIEW INTERPRET ONLY: CPT | Performed by: INTERNAL MEDICINE

## 2022-04-18 NOTE — DISCHARGE SUMMARY
[facility] Medicine Services  DISCHARGE SUMMARY    Patient Name: Lisa Limon  : 1965  MRN: [id number]    Date of Admission: 2022  6:59 PM  Date of Discharge:  22  Primary Care Physician: Natasha Torres APRN    Consults     No orders found from 2021 to 2022.          Hospital Course     Presenting Problem:   Atypical chest pain [R07.89]    Active Hospital Problems    Diagnosis  POA   • **Pneumonia due to COVID-19 virus [U07.1, J12.82]  Yes   • GERD without esophagitis [K21.9]  Yes   • Anxiety and depression [F41.9, F32.A]  Yes   • Tobacco abuse [Z72.0]  Yes   • Obstructive sleep apnea, adult [G47.33]  Yes      Resolved Hospital Problems    Diagnosis Date Resolved POA   • Chest pain [R07.9] 2022 Yes          Hospital Course:  Lisa Limon is a 56 y.o. female with hx of GERD, CHANTE, anxiety/depression, and ongoing tobacco abuse who presents due to chest pain. Has been having COVID-19 symptoms since 21, tested positive on 22. Has been evaluated in the ER 4 times for dyspnea and rash. Was saturating >94% on admission. EKG and troponins unremarkable. Labs showed K 3.4 and elevated D-dimer 1.61. CRP, lactate, procal all normal. CT chest without contrast showed minimal ground glass infiltrate in the medial perihilar region of the RUL and RML. Admitted for further management.     Chest pain, atypical  --EKG and troponins negative. LHC in 2019 was normal.  --PE needs to be ruled out given COVID-19 infection and elevated D-dimer. Unable to do CTA due to contrast allergy, so V/Q scan was done which was indeterminate, however seems to be leaning more toward no evidence of PE--the changes seen are not consistent and likely correspond more to areas of underlying lung disease/pneumonia. Will stop therapeutic Lovenox at this time.   --I think her pain is mostly related to anxiety in setting of COVID pneumonia.     COVID-19 pneumonia  --CT chest  with minimal infiltrate.  --No evidence of secondary bacterial infection--explained to patient as she was inquiring about antibiotics. No indication for antibiotic therapy.   --Has had symptoms since 12/30/21--from my standpoint, I do not think she needs to be in isolation any longer.    --O2 sats intermittently dipping down to 91-92% on room air. Started Decadron 6 mg daily. Apparently has received 6 doses of Decadron prior to admission as she has been seen in the ER almost daily preceding this admission. Will provide 4 more days at discharge.   --Out of window for Remdesivir, and based on prior ED visit notes, she had refused this medication anyway.  --Her labs including CRP are otherwise normal.   --She is now saturating mid 90's on room air. Will send home with a pulse ox.     Hypokalemia  --Replaced per protocol with improvement.     Tobacco abuse  --Nicotine patch offered.  --Counseled on cessation.    Anxiety  --I think this is her main concern, exacerbated by steroids. Will give 3 days of PRN low dose Ativan, and give a new prescription for Hydroxyzine as well.     Discharge Follow Up Recommendations for outpatient labs/diagnostics:  F/U with PCP in 1 week    Day of Discharge     HPI:   Patient seen this morning, complains of anxiety, asking for Ativan. Says steroids are making her anxious and jittery, having trouble sleeping. Saturating 98% on room air during my encounter.    Review of Systems  Gen-no fevers, no chills  CV-no chest pain, no palpitations  Resp-no cough, no dyspnea  GI-no N/V/D, no abd pain    All other systems reviewed and negative except any additional pertinent positives and negatives as discussed in HPI.    Vital Signs:   Temp:  [98 °F (36.7 °C)-98.5 °F (36.9 °C)] 98 °F (36.7 °C)  Heart Rate:  [57-89] 73  Resp:  [16-18] 16  BP: (118-129)/(82-88) 126/82  Flow (L/min):  [2] 2      Physical Exam:  With patient's consent, physical exam was conducted via visual telemedicine encounter due to  patient's current isolation requirements in the interest of PPE conservation.    Constitutional: No acute distress, awake, alert, nontoxic, normal body habitus  HENT: NCAT, MMM, no conjunctival injection  Respiratory: Good effort, nonlabored respirations on room air  Cardiovascular:  tele with NSR  Musculoskeletal: No edema, normal muscle tone and mass for age  Psychiatric: anxious, good insight and judgement, cooperative  Neurologic: Oriented x 3, movements symmetric BUE and BLE, speech clear and fluent  Skin: No visible rashes, no jaundice seen on exposed skin through window        Pertinent  and/or Most Recent Results     LAB RESULTS:      Lab 01/14/22  0407 01/13/22  0414 01/12/22  1456 01/10/22  1057 01/09/22  0800   WBC 9.89 7.16 9.67 9.64 6.30   HEMOGLOBIN 13.4 13.7 15.8 15.6 15.4   HEMATOCRIT 40.1 40.3 45.9 45.7 45.3   PLATELETS 261 261 272 209 154   NEUTROS ABS  --   --  5.42 6.00 4.36   IMMATURE GRANS (ABS)  --   --  0.08* 0.02 0.01   LYMPHS ABS  --   --  3.36* 2.62 1.34   MONOS ABS  --   --  0.77 0.97* 0.57   EOS ABS  --   --  0.02 0.01 0.01   MCV 97.6* 94.6 95.2 95.0 94.4   CRP <0.30  --  0.36  --   --    PROCALCITONIN  --   --  0.05  --  0.05   LACTATE  --   --  1.3  --  2.1*   LDH  --   --  216*  --   --    D DIMER QUANT  --   --   --  1.61*  --          Lab 01/14/22  0407 01/13/22  0414 01/12/22  1456 01/10/22  1057 01/09/22  0800   SODIUM 141 140 140 138 138   POTASSIUM 4.0 4.0 3.7 3.4* 3.4*   CHLORIDE 107 107 103 103 102   CO2 21.0* 20.0* 24.0 20.0* 23.0   ANION GAP 13.0 13.0 13.0 15.0 13.0   BUN 12 12 11 11 7   CREATININE 0.57 0.62 0.77 0.78 0.85   GLUCOSE 109* 177* 97 131* 136*   CALCIUM 9.1 8.7 9.5 9.3 9.3         Lab 01/14/22  0407 01/12/22  1456 01/10/22  1057 01/09/22  0800   TOTAL PROTEIN 6.4 7.6 7.6 7.8   ALBUMIN 3.90 4.50 4.50 4.70   GLOBULIN 2.5 3.1 3.1 3.1   ALT (SGPT) 14 18 16 18   AST (SGOT) 12 17 18 19   BILIRUBIN 0.5 0.7 0.7 0.8   ALK PHOS 93 114 100 102   LIPASE  --  38  --   --           Lab 01/12/22 2047 01/12/22  1456 01/10/22  1057 01/09/22  0800   PROBNP  --  93.2 82.5  --    TROPONIN T <0.010 <0.010 <0.010 <0.010                 Brief Urine Lab Results  (Last result in the past 365 days)      Color   Clarity   Blood   Leuk Est   Nitrite   Protein   CREAT   Urine HCG        01/09/22 0827 Yellow   Clear   Negative   Negative   Negative   Negative               Microbiology Results (last 10 days)     Procedure Component Value - Date/Time    Rapid Strep A Screen - Swab, Throat [[id number]]  (Normal) Collected: 01/09/22 0827    Lab Status: Final result Specimen: Swab from Throat Updated: 01/09/22 0846     Strep A Ag Negative    Narrative:      Test performed by Direct Antigen Testing.    Beta Strep Culture, Throat - Swab, Throat [[id number]]  (Normal) Collected: 01/09/22 0827    Lab Status: Final result Specimen: Swab from Throat Updated: 01/11/22 0732     Throat Culture, Beta Strep No Beta Hemolytic Streptococcus Isolated    Narrative:      Group A Strep incidence is low in adults. Positive culture for Beta hemolytic Streptococcus species can reflect colonization and not true infection. Please correlate clinically.    COVID PRE-OP / PRE-PROCEDURE SCREENING ORDER (NO ISOLATION) - Swab, Nasopharynx [[id number]]  (Abnormal) Collected: 01/09/22 0749    Lab Status: Final result Specimen: Swab from Nasopharynx Updated: 01/09/22 0830    Narrative:      The following orders were created for panel order COVID PRE-OP / PRE-PROCEDURE SCREENING ORDER (NO ISOLATION) - Swab, Nasopharynx.  Procedure                               Abnormality         Status                     ---------                               -----------         ------                     COVID-19 and FLU A/B PCR...[[id number]]  Abnormal            Final result                 Please view results for these tests on the individual orders.    COVID-19 and FLU A/B PCR - Swab, Nasopharynx [[id number]]  (Abnormal) Collected: 01/09/22 0749     Lab Status: Final result Specimen: Swab from Nasopharynx Updated: 01/09/22 0830     COVID19 Detected     Influenza A PCR Not Detected     Influenza B PCR Not Detected    Narrative:      Fact sheet for providers: https://www.fda.gov/media/936562/download    Fact sheet for patients: https://www.fda.gov/media/364966/download    Test performed by PCR.  Influenza A and Influenza B negative results should be considered presumptive in samples that have a positive SARS-CoV-2 result.    Competitive inhibition studies showed that SARS-CoV-2 virus, when present at concentrations above 3.6E+04 copies/mL, can inhibit the detection and amplification of influenza A and influenza B virus RNA if present at or below 1.8E+02 copies/mL or 4.9E+02 copies/mL, respectively, and may lead to false negative influenza virus results. If co-infection with influenza A or influenza B virus is suspected in samples with a positive SARS-CoV-2 result, the sample should be re-tested with another FDA cleared, approved, or authorized influenza test, if influenza virus detection would change clinical management.          CT Chest Without Contrast Diagnostic    Result Date: 1/12/2022  CT Chest WO INDICATION: Chest pain and shortness of air. Covid positive. TECHNIQUE: CT of the chest without IV contrast. Coronal and sagittal reformatted images. Radiation dose reduction techniques included automated exposure control or exposure modulation based on body size. Count of known CT and cardiac nuc med studies performed in previous 12 months: 0. COMPARISON: CT chest 3/21/2020 FINDINGS: Thoracic aorta normal in course and caliber. Heart size is normal. No pericardial effusion. No pleural effusion. No pneumothorax. Subsegmental lingular atelectasis. Minimal groundglass infiltrate in the medial perihilar region of the right upper lobe and right middle lobe. Visualized upper abdomen is unremarkable. Cholecystectomy. No acute osseous abnormality.     Minimal  "groundglass infiltrate in the medial perihilar region of the right upper lobe and right middle lobe which could suggest early developing pneumonia. COVID-19 reporting criteria: Indeterminate. Imaging features can be seen with COVID-19 pneumonia, although are nonspecific and can can occur with a variety of infectious and noninfectious processes. Signer Name: Saw Garner MD  Signed: 1/12/2022 8:06 PM  Workstation Name: [id number]  [facility] Lung Ventilation Perfusion    Result Date: 1/14/2022  EXAMINATION: NM LUNG VENTILATION PERFUSION- 01/13/2022  INDICATION: Chest pain, acute, PE suspected, intermed prob, negative D-dimer; R07.89-Other chest pain; R06.02-Shortness of breath; U07.1-COVID-19; J12.82-Pneumonia due to coronavirus disease 2019; Z72.0-Tobacco use; R79.89-Other specified abnormal findings of blood chemistry; R19.7-Diarrhea, unspecified; R53.81-Other malaise; R53.83-Other fatigue; F41.9-Anxiety disorder, unspecified; F32.A-Depression, unspec  TECHNIQUE: RADIOPHARMACEUTICAL:   5.5 mCi of technetium 99m MAA, IV.  COMPARISON: Portable chest radiograph of 01/12/2022; chest CT scan without contrast 01/12/2020.  FINDINGS: The perfusion pattern is heterogeneous. Ill-defined multifocal pattern, without well-defined, consistent perfusion defects, but multiple rounded areas of reduced radiotracer activity, generally in a perihilar distribution. Review of the patient's CT scan appears to show mild peribronchial thickening and some perihilar interstitial change, and perfusion scan findings could reflect underlying bronchial disease. Study is considered technically indeterminate for pulmonary embolic disease, although lack of any well-defined segmental or subsegmental defects and only ill-defined, mostly \"rat bite\" abnormalities somewhat favors underlying lung disease instead.      Technically indeterminate scan for pulmonary embolus, with no well-defined perfusion defects, but " heterogeneous appearing perfusion scan. This appearance could be caused, however, by an underlying bronchitis or developing pneumonia.  D:  01/13/2022 E:  01/14/2022      XR Chest 1 View    Result Date: 1/12/2022  EXAMINATION: XR CHEST 1 VW- 01/12/2022  INDICATION: Chest Pain triage protocol  COMPARISON: 01/09/2022  FINDINGS: Portable chest reveals cardiac and mediastinal silhouettes within normal limits. The lung fields are grossly clear. No focal parenchymal opacification present. No pleural effusion or pneumothorax. The bony structures are unremarkable. Pulmonary vascularity is within normal limits.        No acute cardiopulmonary disease.  D:  01/12/2022 E:  01/12/2022       XR Chest 1 View    Result Date: 1/9/2022  EXAMINATION: XR CHEST 1 VW-  INDICATION: fever, chest pain  COMPARISON: 3/21/2020  FINDINGS: There is mild left basilar discoid atelectasis. Heart and vasculature appear normal in size. Lungs otherwise appear clear. No effusion or pneumothorax is seen.       Mild left basilar discoid atelectasis. No other evidence of active chest disease is seen.    This report was finalized on 1/9/2022 8:35 AM by Dr. Gerardo Barr MD.        Results for orders placed during the hospital encounter of 11/11/21    Doppler Arterial Lower Extremity Stress CAR    Interpretation Summary  · Right Conclusion: The right BROOK is normal. The test is negative for exercise induced claudication.  · Left Conclusion: The left BROOK is normal. The test is negative for exercise induced claudication.      Results for orders placed during the hospital encounter of 11/11/21    Doppler Arterial Lower Extremity Stress CAR    Interpretation Summary  · Right Conclusion: The right BROOK is normal. The test is negative for exercise induced claudication.  · Left Conclusion: The left BROOK is normal. The test is negative for exercise induced claudication.      Results for orders placed during the hospital encounter of 02/28/19    Adult Transthoracic Echo  "Complete W/ Cont if Necessary Per Protocol    Interpretation Summary  · Left ventricular systolic function is normal. Estimated EF = 60%.  · The cardiac valves are anatomically and functionally normal.  · Estimated right ventricular systolic pressure from tricuspid regurgitation is normal (<35 mmHg).        Discharge Details        Discharge Medications      New Medications      Instructions Start Date   benzonatate 100 MG capsule  Commonly known as: TESSALON   100 mg, Oral, 3 Times Daily PRN         Changes to Medications      Instructions Start Date   dexamethasone 6 MG tablet  Commonly known as: DECADRON  What changed:   · medication strength  · how much to take  · when to take this   6 mg, Oral, Daily With Breakfast   Start Date: January 15, 2022     hydrOXYzine pamoate 25 MG capsule  Commonly known as: VISTARIL  What changed:   · medication strength  · how much to take  · reasons to take this   25 mg, Oral, 3 Times Daily PRN      LORazepam 0.5 MG tablet  Commonly known as: Ativan  What changed:   · how much to take  · how to take this  · when to take this  · reasons to take this   0.5 mg, Oral, Every 12 Hours PRN         Stop These Medications    hydrOXYzine 25 MG tablet  Commonly known as: ATARAX            Allergies   Allergen Reactions   • Iodinated Diagnostic Agents Anaphylaxis   • Zithromax [Azithromycin] Other (See Comments)     \"Had to go to ER with Chest Pain\"   • Amoxicillin Rash and Other (See Comments)     Rash and yeast infection   • Hydrocodone-Acetaminophen Rash   • Tetanus Toxoids Rash and GI Intolerance         Discharge Disposition:  Home or Self Care    Diet:  Hospital:  Diet Order   Procedures   • Diet Regular       Activity:  Activity Instructions     Activity as Tolerated                 CODE STATUS:    Code Status and Medical Interventions:   Ordered at: 01/12/22 6516     Level Of Support Discussed With:    Patient     Code Status (Patient has no pulse and is not breathing):    CPR " (Attempt to Resuscitate)     Medical Interventions (Patient has pulse or is breathing):    Full Support       No future appointments.    Additional Instructions for the Follow-ups that You Need to Schedule     Discharge Follow-up with PCP   As directed       Currently Documented PCP:    Natasha Torres APRN    PCP Phone Number:    [phone]     Follow Up Details: 1 week                     Shelley Stuart MD  01/14/22      Time Spent on Discharge:  I spent  35 minutes on this discharge activity which included: face-to-face encounter with the patient, reviewing the data in the system, coordination of the care with the nursing staff as well as consultants, documentation, and entering orders.           Spironolactone Pregnancy And Lactation Text: This medication can cause feminization of the male fetus and should be avoided during pregnancy. The active metabolite is also found in breast milk.

## 2022-06-05 ENCOUNTER — APPOINTMENT (OUTPATIENT)
Dept: CT IMAGING | Facility: HOSPITAL | Age: 57
End: 2022-06-05

## 2022-06-05 ENCOUNTER — APPOINTMENT (OUTPATIENT)
Dept: GENERAL RADIOLOGY | Facility: HOSPITAL | Age: 57
End: 2022-06-05

## 2022-06-05 ENCOUNTER — HOSPITAL ENCOUNTER (EMERGENCY)
Facility: HOSPITAL | Age: 57
Discharge: HOME | End: 2022-06-05
Attending: EMERGENCY MEDICINE | Admitting: EMERGENCY MEDICINE

## 2022-06-05 VITALS
HEART RATE: 74 BPM | RESPIRATION RATE: 18 BRPM | HEIGHT: 64 IN | OXYGEN SATURATION: 96 % | BODY MASS INDEX: 24.75 KG/M2 | SYSTOLIC BLOOD PRESSURE: 121 MMHG | WEIGHT: 145 LBS | DIASTOLIC BLOOD PRESSURE: 74 MMHG | TEMPERATURE: 97.8 F

## 2022-06-05 DIAGNOSIS — R11.0 NAUSEA: ICD-10-CM

## 2022-06-05 DIAGNOSIS — R10.9 NONSPECIFIC ABDOMINAL PAIN: Primary | ICD-10-CM

## 2022-06-05 DIAGNOSIS — K62.9 RECTAL ABNORMALITY: ICD-10-CM

## 2022-06-05 LAB
ALBUMIN SERPL-MCNC: 4.4 G/DL (ref 3.5–5.2)
ALBUMIN/GLOB SERPL: 1.5 G/DL
ALP SERPL-CCNC: 113 U/L (ref 39–117)
ALT SERPL W P-5'-P-CCNC: 16 U/L (ref 1–33)
ANION GAP SERPL CALCULATED.3IONS-SCNC: 12 MMOL/L (ref 5–15)
AST SERPL-CCNC: 22 U/L (ref 1–32)
BASOPHILS # BLD AUTO: 0.05 10*3/MM3 (ref 0–0.2)
BASOPHILS NFR BLD AUTO: 0.6 % (ref 0–1.5)
BILIRUB SERPL-MCNC: 0.5 MG/DL (ref 0–1.2)
BILIRUB UR QL STRIP: NEGATIVE
BUN SERPL-MCNC: 8 MG/DL (ref 6–20)
BUN/CREAT SERPL: 10.3 (ref 7–25)
CALCIUM SPEC-SCNC: 9 MG/DL (ref 8.6–10.5)
CHLORIDE SERPL-SCNC: 103 MMOL/L (ref 98–107)
CLARITY UR: CLEAR
CO2 SERPL-SCNC: 22 MMOL/L (ref 22–29)
COLOR UR: YELLOW
CREAT SERPL-MCNC: 0.78 MG/DL (ref 0.57–1)
DEPRECATED RDW RBC AUTO: 47.1 FL (ref 37–54)
EGFRCR SERPLBLD CKD-EPI 2021: 90.4 ML/MIN/1.73
EOSINOPHIL # BLD AUTO: 0.06 10*3/MM3 (ref 0–0.4)
EOSINOPHIL NFR BLD AUTO: 0.7 % (ref 0.3–6.2)
ERYTHROCYTE [DISTWIDTH] IN BLOOD BY AUTOMATED COUNT: 13.2 % (ref 12.3–15.4)
GLOBULIN UR ELPH-MCNC: 2.9 GM/DL
GLUCOSE SERPL-MCNC: 111 MG/DL (ref 65–99)
GLUCOSE UR STRIP-MCNC: NEGATIVE MG/DL
HCT VFR BLD AUTO: 44.7 % (ref 34–46.6)
HGB BLD-MCNC: 15 G/DL (ref 12–15.9)
HGB UR QL STRIP.AUTO: NEGATIVE
HOLD SPECIMEN: NORMAL
IMM GRANULOCYTES # BLD AUTO: 0.01 10*3/MM3 (ref 0–0.05)
IMM GRANULOCYTES NFR BLD AUTO: 0.1 % (ref 0–0.5)
KETONES UR QL STRIP: NEGATIVE
LEUKOCYTE ESTERASE UR QL STRIP.AUTO: NEGATIVE
LIPASE SERPL-CCNC: 26 U/L (ref 13–60)
LYMPHOCYTES # BLD AUTO: 1.99 10*3/MM3 (ref 0.7–3.1)
LYMPHOCYTES NFR BLD AUTO: 24 % (ref 19.6–45.3)
MCH RBC QN AUTO: 32.5 PG (ref 26.6–33)
MCHC RBC AUTO-ENTMCNC: 33.6 G/DL (ref 31.5–35.7)
MCV RBC AUTO: 97 FL (ref 79–97)
MONOCYTES # BLD AUTO: 0.53 10*3/MM3 (ref 0.1–0.9)
MONOCYTES NFR BLD AUTO: 6.4 % (ref 5–12)
NEUTROPHILS NFR BLD AUTO: 5.64 10*3/MM3 (ref 1.7–7)
NEUTROPHILS NFR BLD AUTO: 68.2 % (ref 42.7–76)
NITRITE UR QL STRIP: NEGATIVE
NRBC BLD AUTO-RTO: 0 /100 WBC (ref 0–0.2)
PH UR STRIP.AUTO: 6.5 [PH] (ref 5–8)
PLATELET # BLD AUTO: 217 10*3/MM3 (ref 140–450)
PMV BLD AUTO: 9.5 FL (ref 6–12)
POTASSIUM SERPL-SCNC: 3.9 MMOL/L (ref 3.5–5.2)
PROT SERPL-MCNC: 7.3 G/DL (ref 6–8.5)
PROT UR QL STRIP: NEGATIVE
RBC # BLD AUTO: 4.61 10*6/MM3 (ref 3.77–5.28)
SODIUM SERPL-SCNC: 137 MMOL/L (ref 136–145)
SP GR UR STRIP: <=1.005 (ref 1–1.03)
TROPONIN T SERPL-MCNC: <0.01 NG/ML (ref 0–0.03)
UROBILINOGEN UR QL STRIP: NORMAL
WBC NRBC COR # BLD: 8.28 10*3/MM3 (ref 3.4–10.8)
WHOLE BLOOD HOLD COAG: NORMAL
WHOLE BLOOD HOLD SPECIMEN: NORMAL

## 2022-06-05 PROCEDURE — 81003 URINALYSIS AUTO W/O SCOPE: CPT

## 2022-06-05 PROCEDURE — 93005 ELECTROCARDIOGRAM TRACING: CPT | Performed by: EMERGENCY MEDICINE

## 2022-06-05 PROCEDURE — 96375 TX/PRO/DX INJ NEW DRUG ADDON: CPT

## 2022-06-05 PROCEDURE — 74176 CT ABD & PELVIS W/O CONTRAST: CPT

## 2022-06-05 PROCEDURE — 83690 ASSAY OF LIPASE: CPT

## 2022-06-05 PROCEDURE — 25010000002 ONDANSETRON PER 1 MG: Performed by: PHYSICIAN ASSISTANT

## 2022-06-05 PROCEDURE — 25010000002 KETOROLAC TROMETHAMINE PER 15 MG: Performed by: PHYSICIAN ASSISTANT

## 2022-06-05 PROCEDURE — 84484 ASSAY OF TROPONIN QUANT: CPT

## 2022-06-05 PROCEDURE — 85025 COMPLETE CBC W/AUTO DIFF WBC: CPT

## 2022-06-05 PROCEDURE — 93005 ELECTROCARDIOGRAM TRACING: CPT

## 2022-06-05 PROCEDURE — 71045 X-RAY EXAM CHEST 1 VIEW: CPT

## 2022-06-05 PROCEDURE — 80053 COMPREHEN METABOLIC PANEL: CPT

## 2022-06-05 PROCEDURE — 99283 EMERGENCY DEPT VISIT LOW MDM: CPT

## 2022-06-05 PROCEDURE — 96374 THER/PROPH/DIAG INJ IV PUSH: CPT

## 2022-06-05 RX ORDER — ONDANSETRON 4 MG/1
4 TABLET, ORALLY DISINTEGRATING ORAL EVERY 8 HOURS PRN
Qty: 9 TABLET | Refills: 0 | Status: SHIPPED | OUTPATIENT
Start: 2022-06-05 | End: 2022-06-08

## 2022-06-05 RX ORDER — ONDANSETRON 2 MG/ML
4 INJECTION INTRAMUSCULAR; INTRAVENOUS ONCE
Status: COMPLETED | OUTPATIENT
Start: 2022-06-05 | End: 2022-06-05

## 2022-06-05 RX ORDER — SUCRALFATE 1 G/1
1 TABLET ORAL 4 TIMES DAILY
Qty: 12 TABLET | Refills: 0 | Status: SHIPPED | OUTPATIENT
Start: 2022-06-05 | End: 2022-06-08

## 2022-06-05 RX ORDER — KETOROLAC TROMETHAMINE 15 MG/ML
15 INJECTION, SOLUTION INTRAMUSCULAR; INTRAVENOUS ONCE
Status: COMPLETED | OUTPATIENT
Start: 2022-06-05 | End: 2022-06-05

## 2022-06-05 RX ORDER — SODIUM CHLORIDE 0.9 % (FLUSH) 0.9 %
10 SYRINGE (ML) INJECTION AS NEEDED
Status: DISCONTINUED | OUTPATIENT
Start: 2022-06-05 | End: 2022-06-06 | Stop reason: HOSPADM

## 2022-06-05 RX ADMIN — KETOROLAC TROMETHAMINE 15 MG: 15 INJECTION, SOLUTION INTRAMUSCULAR; INTRAVENOUS at 20:44

## 2022-06-05 RX ADMIN — ONDANSETRON 4 MG: 2 INJECTION INTRAMUSCULAR; INTRAVENOUS at 20:44

## 2022-06-06 LAB
QT INTERVAL: 352 MS
QTC INTERVAL: 440 MS

## 2022-06-06 NOTE — ED PROVIDER NOTES
[facility]    EMERGENCY DEPARTMENT ENCOUNTER      Pt Name: Lisa Hernandez  MRN: [id number]  YOB: 1965  Date of evaluation: 6/5/2022  Provider: SARAH Cole    CHIEF COMPLAINT       Chief Complaint   Patient presents with   • Abdominal Pain         HISTORY OF PRESENT ILLNESS  (Location/Symptom, Timing/Onset, Context/Setting, Quality, Duration, Modifying Factors, Severity.)   Lisa Hernandez is a 56 y.o. female who presents to the emergency department with complaints of abdominal pain.  Patient shares for approximately the last month she has been experiencing a vibrating sensation in her rectum.  Patient reports that also for approximately the last 1-1/2 weeks she has been experiencing some stomach issues.  She shares that most of her life she has struggled with constipation but for the last several days she has been experiencing diarrhea once a day, sometimes twice daily.  She reports that the abdominal pain is intense and shares that it must be very bad because she has a very high tolerance for pain.  She reports that Friday night the pain was very severe.  Patient reports that she had her gallbladder removed in 2007 and a hysterectomy in 2010.  No additional abdominal surgeries.  She shares that she experiences her symptoms most approximately 3 to 4 hours after eating.  She reports associated symptoms of nausea but denies vomiting.  No additional complaints on interview and exam.    HPI   Nursing notes were reviewed.    REVIEW OF SYSTEMS    (2-9 systems for level 4, 10 or more for level 5)   Review of Systems   Constitutional: Positive for activity change and appetite change. Negative for chills and fever.   HENT: Negative.    Gastrointestinal: Positive for abdominal pain, diarrhea and nausea. Negative for blood in stool, constipation and vomiting.   Genitourinary: Negative.    Musculoskeletal: Negative.    Neurological: Positive for light-headedness.      All systems reviewed and negative  except for those discussed in HPI.   PAST MEDICAL HISTORY     Past Medical History:   Diagnosis Date   • Acid reflux    • Anxiety    • Colon polyps    • Depression    • Gall stones    • Ovarian cyst    • Pancreatitis    • Ulcer    • UTI (urinary tract infection)          SURGICAL HISTORY       Past Surgical History:   Procedure Laterality Date   • CHOLECYSTECTOMY  2007   • HYSTERECTOMY  2010   • TUBAL ABDOMINAL LIGATION  1994         CURRENT MEDICATIONS     No current facility-administered medications for this encounter.    Current Outpatient Medications:   •  azithromycin (Zithromax Z-Delvin) 250 MG tablet, Take 2 tablets by mouth on day 1, then 1 tablet daily on days 2-5, Disp: 6 tablet, Rfl: 0  •  fluconazole (Diflucan) 150 MG tablet, Take one tablet every 72 hours, Disp: 3 tablet, Rfl: 0  •  nystatin (MYCOSTATIN) 100,000 unit/mL suspension, Swish and swallow 5 mL 4 (Four) Times a Day., Disp: 473 mL, Rfl: 1  •  nystatin-triamcinolone (MYCOLOG) 264744-6.1 UNIT/GM-% ointment, Apply 1 application topically to the appropriate area as directed 2 (Two) Times a Day., Disp: 15 g, Rfl: 1    ALLERGIES     Iodinated diagnostic agents, Amoxicillin, Hydrocodone-acetaminophen, and Tetanus toxoids    FAMILY HISTORY       Family History   Problem Relation Age of Onset   • Migraines Mother    • Heart attack Father    • Diabetes Paternal Aunt    • Diabetes Paternal Uncle    • No Known Problems Sister    • Stroke Brother    • No Known Problems Maternal Grandmother    • No Known Problems Maternal Grandfather    • No Known Problems Paternal Grandmother    • No Known Problems Paternal Grandfather    • Diabetes Other           SOCIAL HISTORY       Social History     Socioeconomic History   • Marital status:    Tobacco Use   • Smoking status: Current Every Day Smoker     Packs/day: 0.25     Years: 36.00     Pack years: 9.00     Types: Cigarettes     Start date: 1985   • Smokeless tobacco: Never Used   Substance and Sexual Activity    • Alcohol use: No   • Drug use: Never   • Sexual activity: Defer         PHYSICAL EXAM    (up to 7 for level 4, 8 or more for level 5)   Physical Exam  Vitals and nursing note reviewed.   Constitutional:       General: She is not in acute distress.     Appearance: Normal appearance. She is well-developed. She is not ill-appearing or toxic-appearing.   HENT:      Head: Normocephalic and atraumatic.      Right Ear: Tympanic membrane and ear canal normal.      Left Ear: Tympanic membrane normal. Laceration present. No drainage or swelling.      Nose: Nose normal.      Mouth/Throat:      Mouth: Mucous membranes are moist.   Eyes:      Extraocular Movements: Extraocular movements intact.   Cardiovascular:      Rate and Rhythm: Normal rate and regular rhythm.   Pulmonary:      Effort: Pulmonary effort is normal. No respiratory distress.      Breath sounds: Normal breath sounds.   Abdominal:      General: Bowel sounds are normal. There is no distension.      Palpations: Abdomen is soft.      Tenderness: There is generalized abdominal tenderness and tenderness in the epigastric area. There is no guarding.   Genitourinary:     Rectum: Normal.   Musculoskeletal:         General: Normal range of motion.      Cervical back: Normal range of motion.   Skin:     General: Skin is warm and dry.   Neurological:      General: No focal deficit present.      Mental Status: She is alert.   Psychiatric:         Mood and Affect: Mood is anxious.         Behavior: Behavior normal.         Thought Content: Thought content normal.         Judgment: Judgment normal.        DIAGNOSTIC RESULTS     EKG: All EKGs are interpreted by the Emergency Department Physician who either signs or Co-signs this chart in the absence of a cardiologist.    ECG 12 Lead   Final Result   Test Reason : UPPER ABD PAIN, CP   Blood Pressure :   */*   mmHG   Vent. Rate :  94 BPM     Atrial Rate :  94 BPM      P-R Int : 106 ms          QRS Dur :  76 ms       QT Int :  352 ms       P-R-T Axes :  61  47  71 degrees      QTc Int : 440 ms      Sinus rhythm with short CO   Nonspecific ST abnormality   Abnormal ECG   When compared with ECG of 20-JAN-2022 20:11,   No significant change was found   Confirmed by GERI CONTRERAS MD ([id number]) on 6/6/2022 3:01:46 PM      Referred By: SHINE           Confirmed By: GERI CONTRERAS MD          RADIOLOGY:   Non-plain film images such as CT, Ultrasound and MRI are read by the radiologist. Plain radiographic images are visualized and preliminarily interpreted by the emergency physician with the below findings:      [x] Radiologist's Report Reviewed:  CT Abdomen Pelvis Without Contrast   Final Result       1.  No acute abnormality within limitations of unenhanced exam. No bowel inflammation visible. Normal appendix.               Electronically signed by:  Olive Garsia M.D.     6/5/2022 7:49 PM Mountain Time      XR Chest 1 View   Final Result       Mild left basilar atelectasis. No evidence of acute cardiopulmonary   process elsewhere.       This report was finalized on 6/5/2022 7:32 PM by Yobani Bah.                ED BEDSIDE ULTRASOUND:   Performed by ED Physician - none    LABS:    I have reviewed and interpreted all of the currently available lab results from this visit (if applicable):  Results for orders placed or performed during the hospital encounter of 06/05/22   Comprehensive Metabolic Panel    Specimen: Blood   Result Value Ref Range    Glucose 111 (H) 65 - 99 mg/dL    BUN 8 6 - 20 mg/dL    Creatinine 0.78 0.57 - 1.00 mg/dL    Sodium 137 136 - 145 mmol/L    Potassium 3.9 3.5 - 5.2 mmol/L    Chloride 103 98 - 107 mmol/L    CO2 22.0 22.0 - 29.0 mmol/L    Calcium 9.0 8.6 - 10.5 mg/dL    Total Protein 7.3 6.0 - 8.5 g/dL    Albumin 4.40 3.50 - 5.20 g/dL    ALT (SGPT) 16 1 - 33 U/L    AST (SGOT) 22 1 - 32 U/L    Alkaline Phosphatase 113 39 - 117 U/L    Total Bilirubin 0.5 0.0 - 1.2 mg/dL    Globulin 2.9 gm/dL    A/G Ratio 1.5 g/dL     BUN/Creatinine Ratio 10.3 7.0 - 25.0    Anion Gap 12.0 5.0 - 15.0 mmol/L    eGFR 90.4 >60.0 mL/min/1.73   Lipase    Specimen: Blood   Result Value Ref Range    Lipase 26 13 - 60 U/L   Troponin    Specimen: Blood   Result Value Ref Range    Troponin T <0.010 0.000 - 0.030 ng/mL   Urinalysis With Microscopic If Indicated (No Culture) - Urine, Clean Catch    Specimen: Urine, Clean Catch   Result Value Ref Range    Color, UA Yellow Yellow, Straw    Appearance, UA Clear Clear    pH, UA 6.5 5.0 - 8.0    Specific Gravity, UA <=1.005 1.001 - 1.030    Glucose, UA Negative Negative    Ketones, UA Negative Negative    Bilirubin, UA Negative Negative    Blood, UA Negative Negative    Protein, UA Negative Negative    Leuk Esterase, UA Negative Negative    Nitrite, UA Negative Negative    Urobilinogen, UA 0.2 E.U./dL 0.2 - 1.0 E.U./dL   CBC Auto Differential    Specimen: Blood   Result Value Ref Range    WBC 8.28 3.40 - 10.80 10*3/mm3    RBC 4.61 3.77 - 5.28 10*6/mm3    Hemoglobin 15.0 12.0 - 15.9 g/dL    Hematocrit 44.7 34.0 - 46.6 %    MCV 97.0 79.0 - 97.0 fL    MCH 32.5 26.6 - 33.0 pg    MCHC 33.6 31.5 - 35.7 g/dL    RDW 13.2 12.3 - 15.4 %    RDW-SD 47.1 37.0 - 54.0 fl    MPV 9.5 6.0 - 12.0 fL    Platelets 217 140 - 450 10*3/mm3    Neutrophil % 68.2 42.7 - 76.0 %    Lymphocyte % 24.0 19.6 - 45.3 %    Monocyte % 6.4 5.0 - 12.0 %    Eosinophil % 0.7 0.3 - 6.2 %    Basophil % 0.6 0.0 - 1.5 %    Immature Grans % 0.1 0.0 - 0.5 %    Neutrophils, Absolute 5.64 1.70 - 7.00 10*3/mm3    Lymphocytes, Absolute 1.99 0.70 - 3.10 10*3/mm3    Monocytes, Absolute 0.53 0.10 - 0.90 10*3/mm3    Eosinophils, Absolute 0.06 0.00 - 0.40 10*3/mm3    Basophils, Absolute 0.05 0.00 - 0.20 10*3/mm3    Immature Grans, Absolute 0.01 0.00 - 0.05 10*3/mm3    nRBC 0.0 0.0 - 0.2 /100 WBC   ECG 12 Lead   Result Value Ref Range    QT Interval 352 ms    QTC Interval 440 ms   Green Top (Gel)   Result Value Ref Range    Extra Tube Hold for add-ons.    Lavender Top    Result Value Ref Range    Extra Tube hold for add-on    Gold Top - SST   Result Value Ref Range    Extra Tube Hold for add-ons.    Gray Top   Result Value Ref Range    Extra Tube Hold for add-ons.    Light Blue Top   Result Value Ref Range    Extra Tube Hold for add-ons.         All other labs were within normal range or not returned as of this dictation.      EMERGENCY DEPARTMENT COURSE and DIFFERENTIAL DIAGNOSIS/MDM:   Vitals:    Vitals:    06/05/22 2000 06/05/22 2001 06/05/22 2030 06/05/22 2200   BP: 135/87  138/88 121/74   BP Location:       Patient Position:       Pulse:    74   Resp:    18   Temp:       TempSrc:       SpO2:  95% 96% 96%   Weight:       Height:           ED Course as of 06/17/22 1158   Sun Jun 05, 2022 2201 In summary this is a 56-year-old female who presents to the emergency department this evening with complaints of abdominal pain and a vibrating sensation in her rectum. No acute or emergent findings demonstrated on physical exam. Labs/urinalysis obtained and reviewed demonstrate no acute or emergent abnormalities.  CT scan of abdomen and pelvis demonstrates no acute abnormality within limitations of unenhanced exam. No bowel inflammation visible. Normal appendix. Imaging of chest demonstrated no acute cardiopulmonary process.  Abdominal exam without peritoneal signs. No evidence of acute abdomen at this time. Well appearing. Low suspicion for acute hepatobiliary disease (includng acute cholecystitis), acute pancreatitis, PUD (including perforation), acute infectious processes (pneumonia, hepatitis, pyelonephritis), acute appendicitis, bowel obstruction or viscus perforation. Presentation not consistent with other acute, emergent causes of abdominal pain at this time. At time of discharge disposition. Patient is afebrile, nontoxic appearing, vital signs stable and able to maintain O2 sats of 96% on room air. Patient will be discharged home with symptomatic care and outpatient follow up.   [JG]      ED Course User Index  [JG] Juice Mead, PA       MDM  Number of Diagnoses or Management Options  Nausea: new, needed workup  Nonspecific abdominal pain: new, needed workup  Rectal abnormality: new, needed workup     Amount and/or Complexity of Data Reviewed  Clinical lab tests: reviewed  Tests in the radiology section of CPT®: reviewed    Risk of Complications, Morbidity, and/or Mortality  Presenting problems: low  Diagnostic procedures: low  Management options: low    Patient Progress  Patient progress: stable       I had a discussion with the patient/family regarding diagnosis, diagnostic results, treatment plan, and medications.  The patient/family indicated understanding of these instructions.  I spent adequate time at the bedside preceding discharge necessary to personally discuss the aftercare instructions, giving patient education, providing explanations of the results of our evaluations/findings, and my decision making to assure that the patient/family understand the plan of care.  Time was allotted to answer questions at that time and throughout the ED course.  Emphasis was placed on timely follow-up after discharge.  I also discussed the potential for the development of an acute emergent condition requiring further evaluation, admission, or even surgical intervention. I discussed that we found nothing during the visit today indicating the need for further workup, admission, or the presence of an unstable medical condition.  I encouraged the patient to return to the emergency department immediately for ANY concerns, worsening, new complaints, or if symptoms persist and unable to seek follow-up in a timely fashion.  The patient/family expressed understanding and agreement with this plan.  The patient will follow-up with primary care and GI as needed for reevaluation.       MEDICATIONS ADMINISTERED IN ED:  Medications   ketorolac (TORADOL) injection 15 mg (15 mg Intravenous Given 6/5/22 2044)    ondansetron (ZOFRAN) injection 4 mg (4 mg Intravenous Given 6/5/22 2044)       PROCEDURES:  Procedures          CRITICAL CARE TIME    Total Critical Care time was 0 minutes, excluding separately reportable procedures.   There was a high probability of clinically significant/life threatening deterioration in the patient's condition which required my urgent intervention.      FINAL IMPRESSION      1. Nonspecific abdominal pain    2. Nausea    3. Rectal abnormality          DISPOSITION/PLAN     ED Disposition     ED Disposition   Discharge    Condition   Stable    Comment   --             PATIENT REFERRED TO:  Paris Hassan APRN  [address]  [address]  [facility] [id number] [phone]    Call   As needed to establish follow-up with your primary care    SharriMarely lee MD  [address] [facility] [address]  [facility] ED [id number] [phone]    Call   Call for follow-up appointment with gastroenterology    [facility] Emergency Department  [address] [facility] [address] [phone]  Go to   If symptoms worsen      DISCHARGE MEDICATIONS:     Medication List      CONTINUE taking these medications    azithromycin 250 MG tablet  Commonly known as: Zithromax Z-Delvin  Take 2 tablets by mouth on day 1, then 1 tablet daily on days 2-5     fluconazole 150 MG tablet  Commonly known as: Diflucan  Take one tablet every 72 hours     nystatin 100,000 unit/mL suspension  Commonly known as: MYCOSTATIN  Swish and swallow 5 mL 4 (Four) Times a Day.     nystatin-triamcinolone 197474-3.1 UNIT/GM-% ointment  Commonly known as: MYCOLOG  Apply 1 application topically to the appropriate area as directed 2 (Two) Times a Day.        ASK your doctor about these medications    ondansetron ODT 4 MG disintegrating tablet  Commonly known as: ZOFRAN-ODT  Place 1 tablet on the tongue Every 8 (Eight) Hours As Needed for Nausea for up to 3 days.  Ask about: Should I take this medication?     sucralfate 1 g  tablet  Commonly known as: CARAFATE  Take 1 tablet by mouth 4 (Four) Times a Day for 3 days.  Ask about: Should I take this medication?           Where to Get Your Medications      These medications were sent to [facility] [address] [place] [address] - [address] [place] [address] - [phone]  - [phone] [address] [address]    Phone: [phone]   · ondansetron ODT 4 MG disintegrating tablet  · sucralfate 1 g tablet             Comment: Please note this report has been produced using speech recognition software.      SARAH Cole Jason C, PA  06/17/22 0368

## 2022-08-19 NOTE — PROGRESS NOTES
"[facility]  [facility]    Chief Complaint  Chest Pain and Follow-up    Subjective    History of Present Illness {CC  Problem List  Visit  Diagnosis   Encounters  Notes  Medications  Labs  Result Review Imaging  Media :23}     Lisa Limon is a 56 y.o. female with chronic chest pain, anxiety, depression, sleep apnea, GERD, tobacco abuse who presents today for ongoing evaluation of chest pain. She notes extreme fatigue and episodes of diaphoresis. She also feels an occasional sensation in her chest as if she is going down a hill and her stomach drops. Occurs about daily. She continues to have chronic chest pain with physical activity, not worsening, feels a lot may be related to anxiety. Denies shortness of breath         Cardiac risks: tobacco abuse, age, family hx (father had MI at 56)    Objective     Vital Signs:   Vitals:    08/22/22 1301   BP: 140/82   BP Location: Left arm   Patient Position: Sitting   Cuff Size: Adult   Pulse: 68   Resp: 17   Temp: 98.3 °F (36.8 °C)   TempSrc: Temporal   SpO2: 98%   Weight: 69.1 kg (152 lb 6 oz)   Height: 162.6 cm (64\")     Body mass index is 26.16 kg/m².  Physical Exam  Vitals reviewed.   Constitutional:       Appearance: Normal appearance.   HENT:      Head: Normocephalic.   Neck:      Vascular: No carotid bruit.   Cardiovascular:      Rate and Rhythm: Normal rate and regular rhythm.      Pulses: Normal pulses.      Heart sounds: Normal heart sounds, S1 normal and S2 normal. No murmur heard.  Pulmonary:      Effort: Pulmonary effort is normal. No respiratory distress.      Breath sounds: Normal breath sounds.   Chest:      Chest wall: No tenderness.   Abdominal:      General: Abdomen is flat.      Palpations: Abdomen is soft.   Musculoskeletal:      Cervical back: Neck supple.      Right lower leg: No edema.      Left lower leg: No edema.   Skin:     General: Skin is warm and dry.   Neurological:      General: No focal deficit " present.      Mental Status: She is alert and oriented to person, place, and time. Mental status is at baseline.   Psychiatric:         Mood and Affect: Mood normal.         Behavior: Behavior normal.         Thought Content: Thought content normal.              Result Review  Data Reviewed:{ Labs  Result Review  Imaging  Med Tab  Media :23}   ECG 12 Lead (06/05/2022 17:44)  Doppler Arterial Lower Extremity Stress CAR (11/11/2021 10:28)  Duplex Carotid Ultrasound CAR (11/11/2021 09:42)  Stress Test With Myocardial Perfusion One Day (02/28/2019 08:25)  Adult Transthoracic Echo Complete W/ Cont if Necessary Per Protocol (02/28/2019 09:56)  Mobile Cardiac Outpatient Telemetry (03/31/2020 09:35)  EKG today shows normal sinus rhythm with sinus arrhythmia           Assessment and Plan {CC Problem List  Visit Diagnosis  ROS  Review (Popup)  Health Maintenance  Quality  BestPractice  Medications  SmartSets  SnapShot Encounters  Media :23}   1. Chest pain, unspecified type  We discussed doing a coronary CTA, but she had anaphylaxis with contrast and would prefer to avoid.  She is hesitant to do stress test because her father had a normal stress test prior to his MI.  Would consider coronary calcium scan.  We will do 2-week Holter monitor.  If arrhythmias noted, would recommend repeat MPI  - ECG 12 Lead; Future    2. Palpitations  Symptoms sound like symptomatic PVCs.  We will do 2-week Holter monitor  - Holter Monitor - 72 Hour Up To 15 Days; Future          Follow Up {Instructions Charge Capture  Follow-up Communications :23}   Return in about 4 weeks (around 9/19/2022) for Monitor results, Video Visit.    Patient was given instructions and counseling regarding her condition or for health maintenance advice. Please see specific information pulled into the AVS if appropriate.  Advised to call the [facility] Center with any questions, concerns, or worsening symptoms.

## 2022-08-22 ENCOUNTER — HOSPITAL ENCOUNTER (OUTPATIENT)
Dept: CARDIOLOGY | Facility: HOSPITAL | Age: 57
Discharge: HOME | End: 2022-08-22

## 2022-08-22 ENCOUNTER — OFFICE VISIT (OUTPATIENT)
Dept: CARDIOLOGY | Facility: HOSPITAL | Age: 57
End: 2022-08-22

## 2022-08-22 ENCOUNTER — TELEPHONE (OUTPATIENT)
Dept: SLEEP MEDICINE | Facility: HOSPITAL | Age: 57
End: 2022-08-22

## 2022-08-22 VITALS
HEART RATE: 68 BPM | WEIGHT: 152.38 LBS | HEIGHT: 64 IN | SYSTOLIC BLOOD PRESSURE: 140 MMHG | OXYGEN SATURATION: 98 % | RESPIRATION RATE: 17 BRPM | DIASTOLIC BLOOD PRESSURE: 82 MMHG | BODY MASS INDEX: 26.02 KG/M2 | TEMPERATURE: 98.3 F

## 2022-08-22 DIAGNOSIS — R07.9 CHEST PAIN, UNSPECIFIED TYPE: Primary | ICD-10-CM

## 2022-08-22 DIAGNOSIS — R07.9 CHEST PAIN, UNSPECIFIED TYPE: ICD-10-CM

## 2022-08-22 DIAGNOSIS — R00.2 PALPITATIONS: ICD-10-CM

## 2022-08-22 LAB
QT INTERVAL: 412 MS
QTC INTERVAL: 431 MS

## 2022-08-22 PROCEDURE — 93010 ELECTROCARDIOGRAM REPORT: CPT | Performed by: INTERNAL MEDICINE

## 2022-08-22 PROCEDURE — 93005 ELECTROCARDIOGRAM TRACING: CPT | Performed by: NURSE PRACTITIONER

## 2022-08-22 PROCEDURE — 99214 OFFICE O/P EST MOD 30 MIN: CPT | Performed by: NURSE PRACTITIONER

## 2022-08-22 PROCEDURE — 93246 EXT ECG>7D<15D RECORDING: CPT

## 2022-08-22 NOTE — TELEPHONE ENCOUNTER
NO APPT AVAILABLE THE AFTERNOON OF 8/22/22   There is no mammographic evidence of malignancy. A 1 year screening mammogram is recommended.

## 2022-08-22 NOTE — TELEPHONE ENCOUNTER
Caller: Lisa Limon    Relationship to patient: Self    Best call back number: [phone]    Type of visit: FOLLOW UP     Requested date: 8/22/22    If rescheduling, when is the original appointment: 8/23/22    Additional notes: PATIENT WILL BE IN [place] TODAY AT 1:00 FOR ANOTHER DRS APPOINTMENT. PATIENT HAS APPOINTMENT 8/23/22 AND WOULD LIKE A CALLBACK TO POSSIBLY SCHEDULE A SAME DAY APPOINTMENT THIS AFTERNOON.

## 2022-08-22 NOTE — PROGRESS NOTES
[facility] Heart Monitor Documentation    Lisa Hernandez Braxton  1965  [id number]  08/22/22      [] ZIO XT Patch  Model [id number] Prescribed for 14 Days    · Serial Number: (N + 9 Digits) [id number]   · Apply-By Date on Box: 741440  · USPS Tracking Number: [id number]  · USPS Tracking        [] [employer]ian MINI PLUS Mobile Cardiac Telemetry  Model BGMINIPLUS Prescribed for N/A Days    · Serial Number: (BGM + 7 Digits) BGM  · Shipped-By Date on Box:   · UPS Tracking Number: [id number]  · UPS Tracking      [] [employer] MINI Holter Monitor  Model BGMINIEL Prescribed for N/A Days    · Serial Number: (7 Digits)   · Shipped-By Date on Box:  · UPS Tracking Number: [id number]  · UPS Tracking        This monitor was applied to the patient's chest and checked for proper functioning.  Ms. Lisa Limon was instructed in the proper use of this monitor.  She was given the opportunity to ask questions and left the office with the device 's instruction manual.    Carie Elizabeth MA, 13:52 EDT, 08/22/22                  [facility] 8.8.[facility]

## 2022-08-23 ENCOUNTER — OFFICE VISIT (OUTPATIENT)
Dept: SLEEP MEDICINE | Facility: HOSPITAL | Age: 57
End: 2022-08-23

## 2022-08-23 VITALS
DIASTOLIC BLOOD PRESSURE: 74 MMHG | OXYGEN SATURATION: 97 % | WEIGHT: 150.8 LBS | BODY MASS INDEX: 25.74 KG/M2 | HEART RATE: 67 BPM | SYSTOLIC BLOOD PRESSURE: 138 MMHG | HEIGHT: 64 IN

## 2022-08-23 DIAGNOSIS — G47.33 OBSTRUCTIVE SLEEP APNEA, ADULT: Primary | ICD-10-CM

## 2022-08-23 DIAGNOSIS — F51.04 PSYCHOPHYSIOLOGICAL INSOMNIA: ICD-10-CM

## 2022-08-23 DIAGNOSIS — G47.30 HYPERSOMNIA WITH SLEEP APNEA: ICD-10-CM

## 2022-08-23 DIAGNOSIS — G47.34 NOCTURNAL HYPOXEMIA: ICD-10-CM

## 2022-08-23 DIAGNOSIS — G47.10 HYPERSOMNIA WITH SLEEP APNEA: ICD-10-CM

## 2022-08-23 PROCEDURE — 99213 OFFICE O/P EST LOW 20 MIN: CPT | Performed by: NURSE PRACTITIONER

## 2022-08-23 NOTE — PROGRESS NOTES
"Chief Complaint:   Chief Complaint   Patient presents with   • Follow-up       HPI:    Lisa Limon is a 56 y.o. female here for follow-up of sleep apnea.  Patient was last seen 10/26/2021.  Patient had stopped her CPAP at that time due to the recall.  She has received her new machine and is using.  I did speak to patient today about 38 out of the last 90 days of use.  Patient states this is not correct that she cannot sleep without it and use it 100% of the time.  Patient at this time is undergoing heart monitor due to chest pain and is seeing cardiology.  She states she is exhausted and is having nocturnal hypoxemia per a pulse ox.  I will order a pulse ox from her DME to recheck this.  Patient states she gets up multiple times during the night and has not [place] score of 18/24.  Patient does not wish to take any medication that is prescription I have asked her to please try extended release melatonin and she feels she can do this without difficulty.  She will continue CPAP and we will reassess.        Current medications are: No current outpatient medications on file..      The patient's relevant past medical, surgical, family and social history were reviewed and updated in Epic as appropriate.       Review of Systems   Constitutional: Positive for fatigue.   Eyes: Positive for visual disturbance.   Respiratory: Positive for apnea.    Cardiovascular: Positive for chest pain.   Gastrointestinal:        Heartburn   Neurological: Positive for dizziness.   Psychiatric/Behavioral: Positive for dysphoric mood and sleep disturbance. The patient is nervous/anxious.    All other systems reviewed and are negative.      /74   Pulse 67   Ht 162.6 cm (64\")   Wt 68.4 kg (150 lb 12.8 oz)   LMP  (LMP Unknown)   SpO2 97%   BMI 25.88 kg/m²     Objective:    Physical Exam  Constitutional:       Appearance: Normal appearance.   HENT:      Head: Normocephalic and atraumatic.      Mouth/Throat:      Comments: Class 4 " airway  Cardiovascular:      Rate and Rhythm: Bradycardia present.   Pulmonary:      Effort: Pulmonary effort is normal. No respiratory distress.   Neurological:      Mental Status: She is alert and oriented to person, place, and time.   Psychiatric:         Mood and Affect: Mood normal.         Behavior: Behavior normal.         Thought Content: Thought content normal.         Judgment: Judgment normal.         CPAP Report  38/90 days of use patient states that is incorrect as she is 100% compliant  Settings 6-16  AHI of 0.8  95th percentile pressure 11.6    The patient continues to use and benefit from CPAP therapy.    ASSESSMENT/PLAN    Diagnoses and all orders for this visit:    1. Obstructive sleep apnea, adult (Primary)  -     PAP Therapy  -     Overnight Sleep Oximetry Study; Future    2. Nocturnal hypoxemia  -     Overnight Sleep Oximetry Study; Future    3. Hypersomnia with sleep apnea    4. Psychophysiological insomnia        1. Counseled patient regarding multimodal approach with healthy nutrition, healthy sleep, regular physical activity, social activities, counseling, and medications. Encouraged to practice lateral sleep position. Avoid alcohol and sedatives close to bedtime.  2.   Refill supplies x1 year we will do overnight oximetry while wearing CPAP to reassess nocturnal hypoxemia.  She states she may feel confident enough to try extended release melatonin for her insomnia.  I would like for her to be cleared by cardiology before we mention anything to help her fatigue during the day.  Patient states she does not think she would wish to try anything but we can reassess next visit.  I will see her back in 3 months to reassess.    I have reviewed the results of my evaluation and impression and discussed my recommendations in detail with the patient.      Signed by  SUSAN Greenfield    August 23, 2022      CC: Paris Hassan APRN         No ref. provider found

## 2022-08-26 ENCOUNTER — TELEPHONE (OUTPATIENT)
Dept: CARDIOLOGY | Facility: HOSPITAL | Age: 57
End: 2022-08-26

## 2022-08-26 ENCOUNTER — HOSPITAL ENCOUNTER (OUTPATIENT)
Dept: CARDIOLOGY | Facility: HOSPITAL | Age: 57
Discharge: HOME | End: 2022-08-26
Admitting: NURSE PRACTITIONER

## 2022-08-26 ENCOUNTER — APPOINTMENT (OUTPATIENT)
Dept: CARDIOLOGY | Facility: HOSPITAL | Age: 57
End: 2022-08-26

## 2022-08-26 DIAGNOSIS — R55 NEAR SYNCOPE: ICD-10-CM

## 2022-08-26 DIAGNOSIS — R00.2 PALPITATIONS: ICD-10-CM

## 2022-08-26 DIAGNOSIS — R00.2 PALPITATIONS: Primary | ICD-10-CM

## 2022-08-26 PROCEDURE — 93270 REMOTE 30 DAY ECG REV/REPORT: CPT

## 2022-08-26 NOTE — TELEPHONE ENCOUNTER
----- Message from Elvis Cavanaugh MA sent at 8/26/2022  9:28 AM EDT -----  Patients Heart Monitor Came of yesterday, was placed on 22nd to be worn for a week. Its a zio, so it cannot be placed again unfortunately, but we may be able to apply another and add it to same study, but Patient reports an episode of near syncope yesterday, nausea and weakness in extremities, as well as racing heart, patient unable to check blood pressure or hr at the time of incident.

## 2022-08-26 NOTE — TELEPHONE ENCOUNTER
[facility] Heart Monitor Documentation    Lisa Hernandez Braxton  1965  [id number]  08/26/22      [] ZIO XT Patch  Model [id number] Prescribed for N/A Days    · Serial Number: (N + 9 Digits) N   · Apply-By Date on Box:   · USPS Tracking Number:   · USPS Tracking        [] [employer] BodyGuardian MINI PLUS Mobile Cardiac Telemetry  Model BGMINIPLUS Prescribed for 30 Days    · Serial Number: (BGM + 7 Digits) [id number]  · Shipped-By Date on Box: 938916  · UPS Tracking Number: [id number]  · UPS Tracking      [] [employer] BodyGuardian MINI Holter Monitor  Model BGMINIEL Prescribed for N/A Days    · Serial Number: (7 Digits)   · Shipped-By Date on Box:   · UPS Tracking Number: [id number]  · UPS Tracking        This monitor was applied to the patient's chest and checked for proper functioning.  Ms. Lisa Limon was instructed in the proper use of this monitor.  She was given the opportunity to ask questions and left the office with the device 's instruction manual.    Carie Elizabeth MA, 16:36 EDT, 08/26/22                  [facility]

## 2022-08-26 NOTE — TELEPHONE ENCOUNTER
"She reports that monitor started blinking orange Wednesday night and then eventually fell off. We offered her a new monitor but she wanted to speak to me today because yesterday was a \"rough day\" because she had a lot of palpitations. This morning she was washing her hands and felt like her \"heart stopped\" and felt like she couldn't breath but then started breathing fast, which lasted about an hour. She is very tearful because symptoms have been ongoing for 4 weeks and she has to figure out what is wrong.  I advised her to come in today and we will place her in a MCOT so we can have some live readings   "

## 2022-08-26 NOTE — TELEPHONE ENCOUNTER
I would have her come in to have it replaced. Just make sure she does not get charged for another one.

## 2022-08-29 DIAGNOSIS — G47.34 NOCTURNAL HYPOXEMIA: ICD-10-CM

## 2022-08-29 DIAGNOSIS — G47.33 OBSTRUCTIVE SLEEP APNEA, ADULT: ICD-10-CM

## 2022-09-01 ENCOUNTER — TELEPHONE (OUTPATIENT)
Dept: SLEEP MEDICINE | Facility: HOSPITAL | Age: 57
End: 2022-09-01

## 2022-10-03 NOTE — PROGRESS NOTES
"[facility]  Heart and Valve Center      Mode of Visit: Video  Location of patient: home  You have chosen to receive care through a telehealth visit.  Does the patient consent to use a video/audio connection for your medical care today? Yes  The visit included audio and video interaction. No technical issues occurred during this visit.     Chief Complaint  Follow-up, Chest Pain, and Palpitations    History of Present Illness    Lisa Limon is a 57 y.o. female with chronic chest pain, anxiety, depression, sleep apnea, GERD and tobacco abuse who presents today as a telemedicine follow-up for palpitations and chest pain.      We attempted to put patient in a 14-day Holter monitor, but the monitor fell off early and due to ongoing episodes of near syncope she was placed in another 30-day event monitor.  Monitor was negative for significant arrhythmias.  Triggered events were PACs and atrial runs/brief AT. PAC burden <1%. She still has ongoing palpitations. Feels like a \"flip flop\" or as if she is on a roller coaster. Denies chest pain. 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    Virtual Visit Physical Exam  Physical Exam  Constitutional:       Appearance: Normal appearance.   HENT:      Head: Normocephalic.   Pulmonary:      Effort: Pulmonary effort is normal. No respiratory distress.   Neurological:      Mental Status: She is alert and oriented to person, place, and time.   Psychiatric:         Mood and Affect: Mood normal.         Behavior: Behavior normal.         Thought Content: Thought content normal.              Result Review  Data Reviewed:{ Labs  Result Review  Imaging  Med Tab  Media :23} "     Doppler Arterial Lower Extremity Stress CAR (11/11/2021 10:28)  Duplex Carotid Ultrasound CAR (11/11/2021 09:42)  Mobile Cardiac Outpatient Telemetry (03/31/2020 09:35)  Adult Transthoracic Echo Complete W/ Cont if Necessary Per Protocol (02/28/2019 09:56)  Stress Test With Myocardial Perfusion One Day (02/28/2019 08:25)  30 day event monitor 8/2022 average HR 69, minimum HR 47, max . No significant arrhythmias. Triggered events PACs (<1%) and atrial runs.            Assessment and Plan {CC Problem List  Visit Diagnosis  ROS  Review (Popup)  Health Maintenance  Quality  BestPractice  Medications  SmartSets  SnapShot Encounters  Media :23}   1. Palpitations  No significant arrhythmias. Triggered events NSR, PACs and atrial runs. Likely aggravated by anxiety.   We discussed that adderall was a stimulant and could make her symptoms worse. She would like to avoid this    2. Chest pain, unspecified type  Currently without chest pain  Encouraged regular exercise and to call if having any exertional symptoms    3. Chronic fatigue  Recommend to increase exercise  Follow up with PCP          Follow Up {Instructions Charge Capture  Follow-up Communications :23}   No follow-ups on file.    Patient was given instructions and counseling regarding her condition or for health maintenance advice. Please see specific information pulled into the AVS if appropriate.  Advised to call the Heart and Valve Center with any questions, concerns, or worsening symptoms.    Dictated Utilizing Dragon Dictation

## 2022-10-04 ENCOUNTER — TELEMEDICINE (OUTPATIENT)
Dept: CARDIOLOGY | Facility: HOSPITAL | Age: 57
End: 2022-10-04

## 2022-10-04 VITALS — HEIGHT: 64 IN | WEIGHT: 154 LBS | BODY MASS INDEX: 26.29 KG/M2

## 2022-10-04 DIAGNOSIS — R07.9 CHEST PAIN, UNSPECIFIED TYPE: ICD-10-CM

## 2022-10-04 DIAGNOSIS — R53.82 CHRONIC FATIGUE: ICD-10-CM

## 2022-10-04 DIAGNOSIS — R00.2 PALPITATIONS: Primary | ICD-10-CM

## 2022-10-04 PROCEDURE — 99213 OFFICE O/P EST LOW 20 MIN: CPT | Performed by: NURSE PRACTITIONER

## 2022-10-04 RX ORDER — GREEN TEA/HOODIA GORDONII 315-12.5MG
1 CAPSULE ORAL DAILY
COMMUNITY

## 2022-10-26 PROCEDURE — 93272 ECG/REVIEW INTERPRET ONLY: CPT | Performed by: INTERNAL MEDICINE

## 2022-12-27 NOTE — OUTREACH NOTE
Joy Magdaleno MA 12/26/2022 11:35 AM EST      ----- Message -----  From: Chiki Barnett  Sent: 12/25/2022 12:56 PM EST  To: Bev Joy [facility]  Subject: Refill questions     Jonathan Steele! I just sent a refill request for my inhaler. Im also in need of the albuterol solution for my nebulizer but wasnt able to do that through [address] I had a massive allergic reaction this weekend and luckily had my inhaler, but a breathing treatment probably would have been helpful. 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 My mom decided it was too cold outside and brought the chickens into the living room. Also, doubling the water pill definitely helps, but I run out before the pharmacy can refill it. Can you rewrite the prescription for me? Thank you!     Jerman Garner Prep Survey      Responses   [facility] patient discharged from? [facility]   Is LACE score < 7 ? Yes   Emergency Room discharge w/ pulse ox? Yes   Eligibility Readm Mgmt   Discharge diagnosis rash,  Covid 19   Does the patient have one of the following disease processes/diagnoses(primary or secondary)? COVID-19   Does the patient have Home health ordered? No   Is there a DME ordered? Yes   What DME was ordered? Sent home w/ pulse ox   General alerts for this patient ED discharge - call x 3 only   Prep survey completed? Yes          Kaye Falk RN

## 2023-01-03 PROCEDURE — 87086 URINE CULTURE/COLONY COUNT: CPT | Performed by: PHYSICIAN ASSISTANT

## 2023-09-05 ENCOUNTER — APPOINTMENT (OUTPATIENT)
Dept: GENERAL RADIOLOGY | Facility: HOSPITAL | Age: 58
End: 2023-09-05
Payer: MEDICAID

## 2023-09-05 ENCOUNTER — HOSPITAL ENCOUNTER (EMERGENCY)
Facility: HOSPITAL | Age: 58
Discharge: HOME | End: 2023-09-05
Attending: EMERGENCY MEDICINE
Payer: MEDICAID

## 2023-09-05 VITALS
DIASTOLIC BLOOD PRESSURE: 82 MMHG | TEMPERATURE: 98.1 F | SYSTOLIC BLOOD PRESSURE: 124 MMHG | RESPIRATION RATE: 18 BRPM | WEIGHT: 150 LBS | HEART RATE: 76 BPM | BODY MASS INDEX: 25.61 KG/M2 | OXYGEN SATURATION: 96 % | HEIGHT: 64 IN

## 2023-09-05 DIAGNOSIS — R07.9 CHEST PAIN, UNSPECIFIED TYPE: ICD-10-CM

## 2023-09-05 DIAGNOSIS — R73.9 HYPERGLYCEMIA: ICD-10-CM

## 2023-09-05 DIAGNOSIS — B27.80 OTHER INFECTIOUS MONONUCLEOSIS WITHOUT COMPLICATION: Primary | ICD-10-CM

## 2023-09-05 LAB
ALBUMIN SERPL-MCNC: 4.1 G/DL (ref 3.5–5.2)
ALBUMIN/GLOB SERPL: 1.5 G/DL
ALP SERPL-CCNC: 113 U/L (ref 39–117)
ALT SERPL W P-5'-P-CCNC: 9 U/L (ref 1–33)
ANION GAP SERPL CALCULATED.3IONS-SCNC: 10 MMOL/L (ref 5–15)
AST SERPL-CCNC: 13 U/L (ref 1–32)
B PARAPERT DNA SPEC QL NAA+PROBE: NOT DETECTED
B PERT DNA SPEC QL NAA+PROBE: NOT DETECTED
BASOPHILS # BLD AUTO: 0.04 10*3/MM3 (ref 0–0.2)
BASOPHILS NFR BLD AUTO: 0.5 % (ref 0–1.5)
BILIRUB SERPL-MCNC: 0.5 MG/DL (ref 0–1.2)
BUN SERPL-MCNC: 10 MG/DL (ref 6–20)
BUN/CREAT SERPL: 13.5 (ref 7–25)
C PNEUM DNA NPH QL NAA+NON-PROBE: NOT DETECTED
CALCIUM SPEC-SCNC: 8.9 MG/DL (ref 8.6–10.5)
CHLORIDE SERPL-SCNC: 107 MMOL/L (ref 98–107)
CO2 SERPL-SCNC: 24 MMOL/L (ref 22–29)
CREAT SERPL-MCNC: 0.74 MG/DL (ref 0.57–1)
D DIMER PPP FEU-MCNC: 0.45 MCGFEU/ML (ref 0–0.56)
DEPRECATED RDW RBC AUTO: 47.9 FL (ref 37–54)
EGFRCR SERPLBLD CKD-EPI 2021: 95.1 ML/MIN/1.73
EOSINOPHIL # BLD AUTO: 0.05 10*3/MM3 (ref 0–0.4)
EOSINOPHIL NFR BLD AUTO: 0.7 % (ref 0.3–6.2)
ERYTHROCYTE [DISTWIDTH] IN BLOOD BY AUTOMATED COUNT: 13 % (ref 12.3–15.4)
FLUAV SUBTYP SPEC NAA+PROBE: NOT DETECTED
FLUBV RNA ISLT QL NAA+PROBE: NOT DETECTED
GEN 5 2HR TROPONIN T REFLEX: 7 NG/L
GLOBULIN UR ELPH-MCNC: 2.7 GM/DL
GLUCOSE SERPL-MCNC: 179 MG/DL (ref 65–99)
HADV DNA SPEC NAA+PROBE: NOT DETECTED
HBA1C MFR BLD: 5.4 % (ref 4.8–5.6)
HCOV 229E RNA SPEC QL NAA+PROBE: NOT DETECTED
HCOV HKU1 RNA SPEC QL NAA+PROBE: NOT DETECTED
HCOV NL63 RNA SPEC QL NAA+PROBE: NOT DETECTED
HCOV OC43 RNA SPEC QL NAA+PROBE: NOT DETECTED
HCT VFR BLD AUTO: 42.8 % (ref 34–46.6)
HETEROPH AB SER QL LA: POSITIVE
HGB BLD-MCNC: 14 G/DL (ref 12–15.9)
HMPV RNA NPH QL NAA+NON-PROBE: NOT DETECTED
HOLD SPECIMEN: NORMAL
HPIV1 RNA ISLT QL NAA+PROBE: NOT DETECTED
HPIV2 RNA SPEC QL NAA+PROBE: NOT DETECTED
HPIV3 RNA NPH QL NAA+PROBE: NOT DETECTED
HPIV4 P GENE NPH QL NAA+PROBE: NOT DETECTED
IMM GRANULOCYTES # BLD AUTO: 0.04 10*3/MM3 (ref 0–0.05)
IMM GRANULOCYTES NFR BLD AUTO: 0.5 % (ref 0–0.5)
LIPASE SERPL-CCNC: 34 U/L (ref 13–60)
LYMPHOCYTES # BLD AUTO: 1.54 10*3/MM3 (ref 0.7–3.1)
LYMPHOCYTES NFR BLD AUTO: 21 % (ref 19.6–45.3)
M PNEUMO IGG SER IA-ACNC: NOT DETECTED
MCH RBC QN AUTO: 32.6 PG (ref 26.6–33)
MCHC RBC AUTO-ENTMCNC: 32.7 G/DL (ref 31.5–35.7)
MCV RBC AUTO: 99.8 FL (ref 79–97)
MONOCYTES # BLD AUTO: 0.38 10*3/MM3 (ref 0.1–0.9)
MONOCYTES NFR BLD AUTO: 5.2 % (ref 5–12)
NEUTROPHILS NFR BLD AUTO: 5.28 10*3/MM3 (ref 1.7–7)
NEUTROPHILS NFR BLD AUTO: 72.1 % (ref 42.7–76)
NRBC BLD AUTO-RTO: 0 /100 WBC (ref 0–0.2)
NT-PROBNP SERPL-MCNC: 141.2 PG/ML (ref 0–900)
PLATELET # BLD AUTO: 218 10*3/MM3 (ref 140–450)
PMV BLD AUTO: 9.2 FL (ref 6–12)
POTASSIUM SERPL-SCNC: 3.8 MMOL/L (ref 3.5–5.2)
PROT SERPL-MCNC: 6.8 G/DL (ref 6–8.5)
QT INTERVAL: 384 MS
QT INTERVAL: 410 MS
QTC INTERVAL: 426 MS
QTC INTERVAL: 462 MS
RBC # BLD AUTO: 4.29 10*6/MM3 (ref 3.77–5.28)
RHINOVIRUS RNA SPEC NAA+PROBE: NOT DETECTED
RSV RNA NPH QL NAA+NON-PROBE: NOT DETECTED
SARS-COV-2 RNA NPH QL NAA+NON-PROBE: NOT DETECTED
SODIUM SERPL-SCNC: 141 MMOL/L (ref 136–145)
TROPONIN T DELTA: 0 NG/L
TROPONIN T SERPL HS-MCNC: 7 NG/L
WBC NRBC COR # BLD: 7.33 10*3/MM3 (ref 3.4–10.8)
WHOLE BLOOD HOLD COAG: NORMAL
WHOLE BLOOD HOLD SPECIMEN: NORMAL

## 2023-09-05 PROCEDURE — 84484 ASSAY OF TROPONIN QUANT: CPT | Performed by: EMERGENCY MEDICINE

## 2023-09-05 PROCEDURE — 71045 X-RAY EXAM CHEST 1 VIEW: CPT

## 2023-09-05 PROCEDURE — 83880 ASSAY OF NATRIURETIC PEPTIDE: CPT | Performed by: EMERGENCY MEDICINE

## 2023-09-05 PROCEDURE — 93005 ELECTROCARDIOGRAM TRACING: CPT

## 2023-09-05 PROCEDURE — 85025 COMPLETE CBC W/AUTO DIFF WBC: CPT | Performed by: EMERGENCY MEDICINE

## 2023-09-05 PROCEDURE — 83036 HEMOGLOBIN GLYCOSYLATED A1C: CPT | Performed by: EMERGENCY MEDICINE

## 2023-09-05 PROCEDURE — 80053 COMPREHEN METABOLIC PANEL: CPT | Performed by: EMERGENCY MEDICINE

## 2023-09-05 PROCEDURE — 99284 EMERGENCY DEPT VISIT MOD MDM: CPT

## 2023-09-05 PROCEDURE — 86308 HETEROPHILE ANTIBODY SCREEN: CPT | Performed by: EMERGENCY MEDICINE

## 2023-09-05 PROCEDURE — 0202U NFCT DS 22 TRGT SARS-COV-2: CPT | Performed by: EMERGENCY MEDICINE

## 2023-09-05 PROCEDURE — 93005 ELECTROCARDIOGRAM TRACING: CPT | Performed by: EMERGENCY MEDICINE

## 2023-09-05 PROCEDURE — 36415 COLL VENOUS BLD VENIPUNCTURE: CPT

## 2023-09-05 PROCEDURE — 83690 ASSAY OF LIPASE: CPT | Performed by: EMERGENCY MEDICINE

## 2023-09-05 PROCEDURE — 85379 FIBRIN DEGRADATION QUANT: CPT | Performed by: EMERGENCY MEDICINE

## 2023-09-05 RX ORDER — KETOROLAC TROMETHAMINE 15 MG/ML
15 INJECTION, SOLUTION INTRAMUSCULAR; INTRAVENOUS ONCE
Status: DISCONTINUED | OUTPATIENT
Start: 2023-09-05 | End: 2023-09-05 | Stop reason: HOSPADM

## 2023-09-05 RX ORDER — ASPIRIN 81 MG/1
324 TABLET, CHEWABLE ORAL ONCE
Status: DISCONTINUED | OUTPATIENT
Start: 2023-09-05 | End: 2023-09-05 | Stop reason: HOSPADM

## 2023-09-05 RX ORDER — SODIUM CHLORIDE 0.9 % (FLUSH) 0.9 %
10 SYRINGE (ML) INJECTION AS NEEDED
Status: DISCONTINUED | OUTPATIENT
Start: 2023-09-05 | End: 2023-09-05 | Stop reason: HOSPADM

## 2023-09-05 RX ADMIN — SODIUM CHLORIDE 500 ML: 9 INJECTION, SOLUTION INTRAVENOUS at 12:48

## 2023-09-06 NOTE — ED PROVIDER NOTES
"Subjective   History of Present Illness  58-year-old female presents for evaluation of multiple complaints.  The patient tells me that over the weekend, 3 days ago, she began experiencing a sensation of generalized weakness and fatigue.  She then began experiencing \"stuffiness\" along with congestion and bilateral ear pain and noted \"lymph node swelling\" to the right side of her neck.  This morning she began experiencing left-sided chest pain that she describes as \"squeezing\" in nature.  She has risk factors for coronary artery disease of smoking and positive family history.  She called EMS regarding her symptoms and noted that the pain was 10 out of 10 in severity on their arrival but improved to 6 out of 10 in severity after administration of nitroglycerin.  She currently complains of a headache that she rates at 5 out of 10 in severity after the nitroglycerin.  She denies any fevers.  No known exposures to anyone with COVID-19.  She has a history of anxiety and wonders if this could be a contributing factor to her symptoms as well.    Review of Systems   Constitutional:  Positive for fatigue.   HENT:  Positive for congestion.    Respiratory:  Positive for chest tightness.    Cardiovascular:  Positive for chest pain.   Neurological:  Positive for weakness.   All other systems reviewed and are negative.    Past Medical History:   Diagnosis Date   • Acid reflux    • Anxiety    • Colon polyps    • Depression    • Gall stones    • Ovarian cyst    • Pancreatitis    • Ulcer    • UTI (urinary tract infection)        Allergies   Allergen Reactions   • Iodinated Contrast Media Anaphylaxis   • Amoxicillin Rash and Other (See Comments)     Rash and yeast infection. Has received Keflex   • Hydrocodone-Acetaminophen Rash   • Tetanus Toxoids Rash and GI Intolerance       Past Surgical History:   Procedure Laterality Date   • CHOLECYSTECTOMY  2007   • HYSTERECTOMY  2010   • TUBAL ABDOMINAL LIGATION  1994       Family History "   Problem Relation Age of Onset   • Migraines Mother    • Heart attack Father    • Diabetes Paternal Aunt    • Diabetes Paternal Uncle    • No Known Problems Sister    • Stroke Brother    • No Known Problems Maternal Grandmother    • No Known Problems Maternal Grandfather    • No Known Problems Paternal Grandmother    • No Known Problems Paternal Grandfather    • Diabetes Other        Social History     Socioeconomic History   • Marital status:    Tobacco Use   • Smoking status: Every Day     Packs/day: 0.25     Years: 36.00     Pack years: 9.00     Types: Cigarettes     Start date: 1985     Passive exposure: Current   • Smokeless tobacco: Never   Vaping Use   • Vaping Use: Never used   Substance and Sexual Activity   • Alcohol use: No   • Drug use: Never   • Sexual activity: Defer           Objective   Physical Exam  Vitals and nursing note reviewed.   Constitutional:       General: She is not in acute distress.     Appearance: She is well-developed. She is not diaphoretic.      Comments: Nontoxic-appearing female   HENT:      Head: Normocephalic and atraumatic.      Comments: Tympanic membrane's are unremarkable in appearance bilaterally  Eyes:      Pupils: Pupils are equal, round, and reactive to light.      Comments: Posterior oropharynx is clear and without erythema or exudate, uvula is midline, normal voice, no trismus   Neck:      Comments: No meningeal signs or nuchal rigidity present  Cardiovascular:      Rate and Rhythm: Normal rate and regular rhythm.      Heart sounds: Normal heart sounds. No murmur heard.    No friction rub. No gallop.   Pulmonary:      Effort: Pulmonary effort is normal. No respiratory distress.      Breath sounds: Normal breath sounds. No wheezing or rales.   Abdominal:      General: Bowel sounds are normal. There is no distension.      Palpations: Abdomen is soft. There is no mass.      Tenderness: There is no abdominal tenderness. There is no guarding or rebound.       Comments: No focal abdominal tenderness, no peritoneal signs, no pain out of proportion to exam   Musculoskeletal:         General: Normal range of motion.      Cervical back: Neck supple.   Lymphadenopathy:      Cervical: Cervical adenopathy present.   Skin:     General: Skin is warm and dry.      Findings: No erythema or rash.   Neurological:      Mental Status: She is alert and oriented to person, place, and time.   Psychiatric:         Mood and Affect: Mood normal.         Thought Content: Thought content normal.         Judgment: Judgment normal.       Procedures           ED Course  ED Course as of 09/06/23 0022   Wed Sep 06, 2023   0021 58-year-old female presents for evaluation of multiple complaints including generalized weakness, bilateral ear pain, congestion, lymphadenopathy, and chest pain today.  On arrival to the ED, the patient is well-appearing.  Vital signs are reassuring.  Exam is benign.  Labs remarkable for positive Monospot test.  Respiratory viral panel is negative.  Initial troponin is negative.  Low risk Wells and D-dimer is negative. [DD]   0021 I personally and independently viewed the patient's x-ray images myself, and I am in agreement with the radiologist's reading for final interpretation--particularly, there is no pneumonia or pulmonary edema present. [DD]   0021 Upon reevaluation, the patient looks and feels markedly improved. [DD]   0022 Repeat troponin/EKG negative/unchanged.  Doubt ACS, PE, dissection, or emergent cardiothoracic process at this time based on exam, history, clinical presentation, gestalt, objective findings in the ED, and risk stratification.  HEART score of 3.  The patient will follow up with the chest pain clinic within the next 72 hours for further outpatient work-up and evaluation.  Agreeable with plan and given appropriate strict return precautions.     [DD]      ED Course User Index  [DD] Geovany Barros MD                                      Recent  Results (from the past 24 hour(s))   ECG 12 Lead ED Triage Standing Order; Chest Pain    Collection Time: 09/05/23 11:30 AM   Result Value Ref Range    QT Interval 384 ms    QTC Interval 462 ms   High Sensitivity Troponin T    Collection Time: 09/05/23 11:51 AM    Specimen: Blood   Result Value Ref Range    HS Troponin T 7 <10 ng/L   Comprehensive Metabolic Panel    Collection Time: 09/05/23 11:51 AM    Specimen: Blood   Result Value Ref Range    Glucose 179 (H) 65 - 99 mg/dL    BUN 10 6 - 20 mg/dL    Creatinine 0.74 0.57 - 1.00 mg/dL    Sodium 141 136 - 145 mmol/L    Potassium 3.8 3.5 - 5.2 mmol/L    Chloride 107 98 - 107 mmol/L    CO2 24.0 22.0 - 29.0 mmol/L    Calcium 8.9 8.6 - 10.5 mg/dL    Total Protein 6.8 6.0 - 8.5 g/dL    Albumin 4.1 3.5 - 5.2 g/dL    ALT (SGPT) 9 1 - 33 U/L    AST (SGOT) 13 1 - 32 U/L    Alkaline Phosphatase 113 39 - 117 U/L    Total Bilirubin 0.5 0.0 - 1.2 mg/dL    Globulin 2.7 gm/dL    A/G Ratio 1.5 g/dL    BUN/Creatinine Ratio 13.5 7.0 - 25.0    Anion Gap 10.0 5.0 - 15.0 mmol/L    eGFR 95.1 >60.0 mL/min/1.73   Lipase    Collection Time: 09/05/23 11:51 AM    Specimen: Blood   Result Value Ref Range    Lipase 34 13 - 60 U/L   BNP    Collection Time: 09/05/23 11:51 AM    Specimen: Blood   Result Value Ref Range    proBNP 141.2 0.0 - 900.0 pg/mL   Green Top (Gel)    Collection Time: 09/05/23 11:51 AM   Result Value Ref Range    Extra Tube Hold for add-ons.    Lavender Top    Collection Time: 09/05/23 11:51 AM   Result Value Ref Range    Extra Tube hold for add-on    Gold Top - SST    Collection Time: 09/05/23 11:51 AM   Result Value Ref Range    Extra Tube Hold for add-ons.    Gray Top    Collection Time: 09/05/23 11:51 AM   Result Value Ref Range    Extra Tube Hold for add-ons.    Light Blue Top    Collection Time: 09/05/23 11:51 AM   Result Value Ref Range    Extra Tube Hold for add-ons.    CBC Auto Differential    Collection Time: 09/05/23 11:51 AM    Specimen: Blood   Result Value Ref  Range    WBC 7.33 3.40 - 10.80 10*3/mm3    RBC 4.29 3.77 - 5.28 10*6/mm3    Hemoglobin 14.0 12.0 - 15.9 g/dL    Hematocrit 42.8 34.0 - 46.6 %    MCV 99.8 (H) 79.0 - 97.0 fL    MCH 32.6 26.6 - 33.0 pg    MCHC 32.7 31.5 - 35.7 g/dL    RDW 13.0 12.3 - 15.4 %    RDW-SD 47.9 37.0 - 54.0 fl    MPV 9.2 6.0 - 12.0 fL    Platelets 218 140 - 450 10*3/mm3    Neutrophil % 72.1 42.7 - 76.0 %    Lymphocyte % 21.0 19.6 - 45.3 %    Monocyte % 5.2 5.0 - 12.0 %    Eosinophil % 0.7 0.3 - 6.2 %    Basophil % 0.5 0.0 - 1.5 %    Immature Grans % 0.5 0.0 - 0.5 %    Neutrophils, Absolute 5.28 1.70 - 7.00 10*3/mm3    Lymphocytes, Absolute 1.54 0.70 - 3.10 10*3/mm3    Monocytes, Absolute 0.38 0.10 - 0.90 10*3/mm3    Eosinophils, Absolute 0.05 0.00 - 0.40 10*3/mm3    Basophils, Absolute 0.04 0.00 - 0.20 10*3/mm3    Immature Grans, Absolute 0.04 0.00 - 0.05 10*3/mm3    nRBC 0.0 0.0 - 0.2 /100 WBC   D-dimer, Quantitative    Collection Time: 09/05/23 11:51 AM    Specimen: Blood   Result Value Ref Range    D-Dimer, Quantitative 0.45 0.00 - 0.56 MCGFEU/mL   Mononucleosis Screen    Collection Time: 09/05/23 11:51 AM    Specimen: Blood   Result Value Ref Range    Monospot Positive (A) Negative   Hemoglobin A1c    Collection Time: 09/05/23 11:51 AM    Specimen: Blood   Result Value Ref Range    Hemoglobin A1C 5.40 4.80 - 5.60 %   Respiratory Panel PCR w/COVID-19(SARS-CoV-2) [facility] In-House, NP Swab in [facility], 3-4 HR TAT - Swab, Nasopharynx    Collection Time: 09/05/23 12:52 PM    Specimen: Nasopharynx; Swab   Result Value Ref Range    ADENOVIRUS, PCR Not Detected Not Detected    Coronavirus 229E Not Detected Not Detected    Coronavirus HKU1 Not Detected Not Detected    Coronavirus NL63 Not Detected Not Detected    Coronavirus OC43 Not Detected Not Detected    COVID19 Not Detected Not Detected - Ref. Range    Human Metapneumovirus Not Detected Not Detected    Human Rhinovirus/Enterovirus Not Detected Not Detected    Influenza A  PCR Not Detected Not Detected    Influenza B PCR Not Detected Not Detected    Parainfluenza Virus 1 Not Detected Not Detected    Parainfluenza Virus 2 Not Detected Not Detected    Parainfluenza Virus 3 Not Detected Not Detected    Parainfluenza Virus 4 Not Detected Not Detected    RSV, PCR Not Detected Not Detected    Bordetella pertussis pcr Not Detected Not Detected    Bordetella parapertussis PCR Not Detected Not Detected    Chlamydophila pneumoniae PCR Not Detected Not Detected    Mycoplasma pneumo by PCR Not Detected Not Detected   ECG 12 Lead ED Triage Standing Order; Chest Pain    Collection Time: 09/05/23  2:24 PM   Result Value Ref Range    QT Interval 410 ms    QTC Interval 426 ms   High Sensitivity Troponin T 2Hr    Collection Time: 09/05/23  2:33 PM    Specimen: Blood   Result Value Ref Range    HS Troponin T 7 <10 ng/L    Troponin T Delta 0 >=-4 - <+4 ng/L     Note: In addition to lab results from this visit, the labs listed above may include labs taken at another facility or during a different encounter within the last 24 hours. Please correlate lab times with ED admission and discharge times for further clarification of the services performed during this visit.    XR Chest 1 View   Final Result   Impression:   1.An acute pulmonary process is not apparent.         Electronically Signed: Demetrius Jensen MD     9/5/2023 12:22 PM EDT     Workstation ID: [id number]        Vitals:    09/05/23 1447 09/05/23 1500 09/05/23 1513 09/05/23 1514   BP:  124/82     BP Location:       Patient Position:       Pulse: 76 70 78 76   Resp:       Temp:       TempSrc:       SpO2: 95% 96% 96% 96%   Weight:       Height:         Medications   sodium chloride 0.9 % bolus 500 mL (0 mL Intravenous Stopped 9/5/23 1541)     ECG/EMG Results (last 24 hours)       ** No results found for the last 24 hours. **          ECG 12 Lead ED Triage Standing Order; Chest Pain   Final Result   Test Reason : ED Triage Standing Order~   Blood Pressure  :   */*   mmHG   Vent. Rate :  65 BPM     Atrial Rate :  65 BPM      P-R Int : 120 ms          QRS Dur :  82 ms       QT Int : 410 ms       P-R-T Axes :  58  33  54 degrees      QTc Int : 426 ms      Normal sinus rhythm   Normal ECG   When compared with ECG of 05-SEP-2023 11:30, (Unconfirmed)   No significant change was found   Confirmed by MD Barros Michael ([id number]) on 9/5/2023 10:23:08 PM      Referred By: SHINE           Confirmed By: Jovanni Barros MD      ECG 12 Lead ED Triage Standing Order; Chest Pain   Final Result   Test Reason : ED Triage Standing Order~   Blood Pressure :   */*   mmHG   Vent. Rate :  87 BPM     Atrial Rate :  87 BPM      P-R Int : 130 ms          QRS Dur :  74 ms       QT Int : 384 ms       P-R-T Axes :  78  28  52 degrees      QTc Int : 462 ms      Normal sinus rhythm   Normal ECG   When compared with ECG of 22-AUG-2022 13:10,   No significant change was found   Confirmed by MD Barros Michael ([id number]) on 9/5/2023 10:22:37 PM      Referred By: EDMD           Confirmed By: Jovanni Barros MD                 Medical Decision Making  Problems Addressed:  Chest pain, unspecified type: complicated acute illness or injury  Hyperglycemia: complicated acute illness or injury  Other infectious mononucleosis without complication: complicated acute illness or injury    Amount and/or Complexity of Data Reviewed  Labs: ordered.  Radiology: ordered.  ECG/medicine tests: ordered.        Final diagnoses:   Other infectious mononucleosis without complication   Hyperglycemia   Chest pain, unspecified type       ED Disposition  ED Disposition       ED Disposition   Discharge    Condition   Stable    Comment   --               [facility] CARDIOLOGY  [address]  [address]  [facility] [facility] [id number] [phone]  In 3 days      Paris Hassan APRN  [address]  [address]  [facility] [address]  [phone]    In 1 week           Medication List      No changes were made to your  prescriptions during this visit.            Geovany Barros MD  09/06/23 0022

## 2023-12-23 ENCOUNTER — HOSPITAL ENCOUNTER (EMERGENCY)
Facility: HOSPITAL | Age: 58
Discharge: HOME | End: 2023-12-24
Attending: EMERGENCY MEDICINE
Payer: MEDICAID

## 2023-12-23 DIAGNOSIS — Z91.89 ACUTE NONSPECIFIC CHEST PAIN WITH LOW RISK OF CORONARY ARTERY DISEASE: Primary | ICD-10-CM

## 2023-12-23 DIAGNOSIS — R07.9 ACUTE NONSPECIFIC CHEST PAIN WITH LOW RISK OF CORONARY ARTERY DISEASE: Primary | ICD-10-CM

## 2023-12-23 PROCEDURE — 85025 COMPLETE CBC W/AUTO DIFF WBC: CPT

## 2023-12-23 PROCEDURE — 83880 ASSAY OF NATRIURETIC PEPTIDE: CPT

## 2023-12-23 PROCEDURE — 93005 ELECTROCARDIOGRAM TRACING: CPT

## 2023-12-23 PROCEDURE — 80053 COMPREHEN METABOLIC PANEL: CPT

## 2023-12-23 PROCEDURE — 99284 EMERGENCY DEPT VISIT MOD MDM: CPT

## 2023-12-23 PROCEDURE — 84484 ASSAY OF TROPONIN QUANT: CPT

## 2023-12-23 PROCEDURE — 83690 ASSAY OF LIPASE: CPT

## 2023-12-23 RX ORDER — SODIUM CHLORIDE 0.9 % (FLUSH) 0.9 %
10 SYRINGE (ML) INJECTION AS NEEDED
Status: DISCONTINUED | OUTPATIENT
Start: 2023-12-23 | End: 2023-12-24 | Stop reason: HOSPADM

## 2023-12-23 RX ORDER — ASPIRIN 81 MG/1
324 TABLET, CHEWABLE ORAL ONCE
Status: DISCONTINUED | OUTPATIENT
Start: 2023-12-23 | End: 2023-12-24 | Stop reason: HOSPADM

## 2023-12-24 ENCOUNTER — APPOINTMENT (OUTPATIENT)
Dept: GENERAL RADIOLOGY | Facility: HOSPITAL | Age: 58
End: 2023-12-24
Payer: MEDICAID

## 2023-12-24 VITALS
BODY MASS INDEX: 26.46 KG/M2 | WEIGHT: 155 LBS | HEIGHT: 64 IN | DIASTOLIC BLOOD PRESSURE: 82 MMHG | SYSTOLIC BLOOD PRESSURE: 128 MMHG | HEART RATE: 73 BPM | OXYGEN SATURATION: 96 % | RESPIRATION RATE: 18 BRPM | TEMPERATURE: 97.9 F

## 2023-12-24 LAB
ALBUMIN SERPL-MCNC: 4.5 G/DL (ref 3.5–5.2)
ALBUMIN/GLOB SERPL: 1.7 G/DL
ALP SERPL-CCNC: 140 U/L (ref 39–117)
ALT SERPL W P-5'-P-CCNC: 14 U/L (ref 1–33)
ANION GAP SERPL CALCULATED.3IONS-SCNC: 12 MMOL/L (ref 5–15)
AST SERPL-CCNC: 15 U/L (ref 1–32)
B PARAPERT DNA SPEC QL NAA+PROBE: NOT DETECTED
B PERT DNA SPEC QL NAA+PROBE: NOT DETECTED
BASOPHILS # BLD AUTO: 0.06 10*3/MM3 (ref 0–0.2)
BASOPHILS NFR BLD AUTO: 0.5 % (ref 0–1.5)
BILIRUB SERPL-MCNC: 0.3 MG/DL (ref 0–1.2)
BUN SERPL-MCNC: 20 MG/DL (ref 6–20)
BUN/CREAT SERPL: 22.5 (ref 7–25)
C PNEUM DNA NPH QL NAA+NON-PROBE: NOT DETECTED
CALCIUM SPEC-SCNC: 9.4 MG/DL (ref 8.6–10.5)
CHLORIDE SERPL-SCNC: 103 MMOL/L (ref 98–107)
CO2 SERPL-SCNC: 24 MMOL/L (ref 22–29)
CREAT SERPL-MCNC: 0.89 MG/DL (ref 0.57–1)
DEPRECATED RDW RBC AUTO: 49.1 FL (ref 37–54)
EGFRCR SERPLBLD CKD-EPI 2021: 76.2 ML/MIN/1.73
EOSINOPHIL # BLD AUTO: 0.16 10*3/MM3 (ref 0–0.4)
EOSINOPHIL NFR BLD AUTO: 1.4 % (ref 0.3–6.2)
ERYTHROCYTE [DISTWIDTH] IN BLOOD BY AUTOMATED COUNT: 13.1 % (ref 12.3–15.4)
FLUAV SUBTYP SPEC NAA+PROBE: NOT DETECTED
FLUBV RNA ISLT QL NAA+PROBE: NOT DETECTED
GEN 5 2HR TROPONIN T REFLEX: <6 NG/L
GLOBULIN UR ELPH-MCNC: 2.6 GM/DL
GLUCOSE SERPL-MCNC: 115 MG/DL (ref 65–99)
HADV DNA SPEC NAA+PROBE: NOT DETECTED
HCOV 229E RNA SPEC QL NAA+PROBE: NOT DETECTED
HCOV HKU1 RNA SPEC QL NAA+PROBE: NOT DETECTED
HCOV NL63 RNA SPEC QL NAA+PROBE: NOT DETECTED
HCOV OC43 RNA SPEC QL NAA+PROBE: NOT DETECTED
HCT VFR BLD AUTO: 41.9 % (ref 34–46.6)
HGB BLD-MCNC: 13.8 G/DL (ref 12–15.9)
HMPV RNA NPH QL NAA+NON-PROBE: NOT DETECTED
HOLD SPECIMEN: NORMAL
HPIV1 RNA ISLT QL NAA+PROBE: NOT DETECTED
HPIV2 RNA SPEC QL NAA+PROBE: NOT DETECTED
HPIV3 RNA NPH QL NAA+PROBE: NOT DETECTED
HPIV4 P GENE NPH QL NAA+PROBE: NOT DETECTED
IMM GRANULOCYTES # BLD AUTO: 0.04 10*3/MM3 (ref 0–0.05)
IMM GRANULOCYTES NFR BLD AUTO: 0.3 % (ref 0–0.5)
LIPASE SERPL-CCNC: 43 U/L (ref 13–60)
LYMPHOCYTES # BLD AUTO: 3.27 10*3/MM3 (ref 0.7–3.1)
LYMPHOCYTES NFR BLD AUTO: 28.2 % (ref 19.6–45.3)
M PNEUMO IGG SER IA-ACNC: NOT DETECTED
MCH RBC QN AUTO: 33.3 PG (ref 26.6–33)
MCHC RBC AUTO-ENTMCNC: 32.9 G/DL (ref 31.5–35.7)
MCV RBC AUTO: 101.2 FL (ref 79–97)
MONOCYTES # BLD AUTO: 0.66 10*3/MM3 (ref 0.1–0.9)
MONOCYTES NFR BLD AUTO: 5.7 % (ref 5–12)
NEUTROPHILS NFR BLD AUTO: 63.9 % (ref 42.7–76)
NEUTROPHILS NFR BLD AUTO: 7.42 10*3/MM3 (ref 1.7–7)
NRBC BLD AUTO-RTO: 0 /100 WBC (ref 0–0.2)
NT-PROBNP SERPL-MCNC: 85.6 PG/ML (ref 0–900)
PLATELET # BLD AUTO: 222 10*3/MM3 (ref 140–450)
PMV BLD AUTO: 9.4 FL (ref 6–12)
POTASSIUM SERPL-SCNC: 3.5 MMOL/L (ref 3.5–5.2)
PROT SERPL-MCNC: 7.1 G/DL (ref 6–8.5)
RBC # BLD AUTO: 4.14 10*6/MM3 (ref 3.77–5.28)
RHINOVIRUS RNA SPEC NAA+PROBE: NOT DETECTED
RSV RNA NPH QL NAA+NON-PROBE: NOT DETECTED
SARS-COV-2 RNA NPH QL NAA+NON-PROBE: NOT DETECTED
SODIUM SERPL-SCNC: 139 MMOL/L (ref 136–145)
TROPONIN T DELTA: NORMAL
TROPONIN T SERPL HS-MCNC: <6 NG/L
WBC NRBC COR # BLD AUTO: 11.61 10*3/MM3 (ref 3.4–10.8)
WHOLE BLOOD HOLD COAG: NORMAL
WHOLE BLOOD HOLD SPECIMEN: NORMAL

## 2023-12-24 PROCEDURE — 71045 X-RAY EXAM CHEST 1 VIEW: CPT

## 2023-12-24 PROCEDURE — 36415 COLL VENOUS BLD VENIPUNCTURE: CPT

## 2023-12-24 PROCEDURE — 0202U NFCT DS 22 TRGT SARS-COV-2: CPT | Performed by: PHYSICIAN ASSISTANT

## 2023-12-24 PROCEDURE — 84484 ASSAY OF TROPONIN QUANT: CPT

## 2023-12-24 PROCEDURE — 93005 ELECTROCARDIOGRAM TRACING: CPT

## 2023-12-24 RX ORDER — KETOROLAC TROMETHAMINE 15 MG/ML
15 INJECTION, SOLUTION INTRAMUSCULAR; INTRAVENOUS ONCE
Status: DISCONTINUED | OUTPATIENT
Start: 2023-12-24 | End: 2023-12-24 | Stop reason: HOSPADM

## 2023-12-24 RX ORDER — ALUMINA, MAGNESIA, AND SIMETHICONE 2400; 2400; 240 MG/30ML; MG/30ML; MG/30ML
15 SUSPENSION ORAL ONCE
Status: COMPLETED | OUTPATIENT
Start: 2023-12-24 | End: 2023-12-24

## 2023-12-24 RX ADMIN — ALUMINUM HYDROXIDE, MAGNESIUM HYDROXIDE, AND DIMETHICONE 15 ML: 400; 400; 40 SUSPENSION ORAL at 02:03

## 2023-12-24 NOTE — ED PROVIDER NOTES
EMERGENCY DEPARTMENT ENCOUNTER    Pt Name: Lisa Limon  MRN: [id number]  Pt :   1965  Room Number:    Date of encounter:  2023  PCP: Paris Hassan APRN  ED Provider: SARAH Cole    Historian: Patient    HPI:  Chief Complaint: Chest Pain    Context: Lisa Limon is a 58 y.o. female who presents to the ED c/o chest pain.  Patient shares that she has been experiencing pain in her chest, fatigue and a sore throat.  Patient shares that her symptoms started on 2023.  She also reports a intermittent cough.  She denies any shortness of breath.  She does report that she felt as if she had a fever when her symptoms started initially.  Patient reports that on Thursday of this week she had an episode where she was talking to her friend and started experiencing significant pain in the right side of her chest that radiated into her shoulder.  She woke up Friday morning and looked up information on [employer] with regards to her symptoms and was concerned and called her cardiologist.  She reported they could not get her in until January and so she did not seek immediate medical care.  Patient states that again this evening she had a good day with her  who was putting together a TV stand and while she was talking to him she again started to experience pain in the left side of her chest.  Patient states that also this evening she had pain in the middle of her chest that felt like something was pushing on it.  Patient does have history of anxiety.  She denies any prior cardiac history.  She states that she still has some discomfort but it has significantly improved.  HPI     REVIEW OF SYSTEMS  A chief complaint appropriate review of systems was completed and is negative except as noted in the HPI.     PAST MEDICAL HISTORY  Past Medical History:   Diagnosis Date    Acid reflux     Anxiety     Colon polyps     Depression     Gall stones     Ovarian cyst     Pancreatitis      Ulcer     UTI (urinary tract infection)        PAST SURGICAL HISTORY  Past Surgical History:   Procedure Laterality Date    CHOLECYSTECTOMY  2007    HYSTERECTOMY  2010    TUBAL ABDOMINAL LIGATION  1994       FAMILY HISTORY  Family History   Problem Relation Age of Onset    Migraines Mother     Heart attack Father     Diabetes Paternal Aunt     Diabetes Paternal Uncle     No Known Problems Sister     Stroke Brother     No Known Problems Maternal Grandmother     No Known Problems Maternal Grandfather     No Known Problems Paternal Grandmother     No Known Problems Paternal Grandfather     Diabetes Other        SOCIAL HISTORY  Social History     Socioeconomic History    Marital status: Legally    Tobacco Use    Smoking status: Every Day     Packs/day: 0.25     Years: 36.00     Additional pack years: 0.00     Total pack years: 9.00     Types: Cigarettes     Start date: 1985     Passive exposure: Current    Smokeless tobacco: Never   Vaping Use    Vaping Use: Never used   Substance and Sexual Activity    Alcohol use: No    Drug use: Never    Sexual activity: Defer       ALLERGIES  Iodinated contrast media, Amoxicillin, Hydrocodone-acetaminophen, and Tetanus toxoids    PHYSICAL EXAM  Physical Exam  GENERAL:   Appears in no acute distress.  HENT: Nares patent.  EYES: No scleral icterus.  CV: Regular rhythm, regular rate.  RESPIRATORY: Normal effort.   ABDOMEN: Soft, nontender  MUSCULOSKELETAL: No deformities.   NEURO: Alert, moves all extremities, follows commands.  SKIN: Warm, dry, no rash visualized.  PSYCH: Anxious  I have reviewed the triage vital signs and nursing notes.     LAB RESULTS  Results for orders placed or performed during the hospital encounter of 12/23/23   Respiratory Panel PCR w/COVID-19(SARS-CoV-2) [facility] In-House, NP Swab in UTM/VTM, 2 HR TAT - Swab, Nasopharynx    Specimen: Nasopharynx; Swab   Result Value Ref Range    ADENOVIRUS, PCR Not Detected Not Detected    Coronavirus  229E Not Detected Not Detected    Coronavirus HKU1 Not Detected Not Detected    Coronavirus NL63 Not Detected Not Detected    Coronavirus OC43 Not Detected Not Detected    COVID19 Not Detected Not Detected - Ref. Range    Human Metapneumovirus Not Detected Not Detected    Human Rhinovirus/Enterovirus Not Detected Not Detected    Influenza A PCR Not Detected Not Detected    Influenza B PCR Not Detected Not Detected    Parainfluenza Virus 1 Not Detected Not Detected    Parainfluenza Virus 2 Not Detected Not Detected    Parainfluenza Virus 3 Not Detected Not Detected    Parainfluenza Virus 4 Not Detected Not Detected    RSV, PCR Not Detected Not Detected    Bordetella pertussis pcr Not Detected Not Detected    Bordetella parapertussis PCR Not Detected Not Detected    Chlamydophila pneumoniae PCR Not Detected Not Detected    Mycoplasma pneumo by PCR Not Detected Not Detected   High Sensitivity Troponin T    Specimen: Blood   Result Value Ref Range    HS Troponin T <6 <14 ng/L   Comprehensive Metabolic Panel    Specimen: Blood   Result Value Ref Range    Glucose 115 (H) 65 - 99 mg/dL    BUN 20 6 - 20 mg/dL    Creatinine 0.89 0.57 - 1.00 mg/dL    Sodium 139 136 - 145 mmol/L    Potassium 3.5 3.5 - 5.2 mmol/L    Chloride 103 98 - 107 mmol/L    CO2 24.0 22.0 - 29.0 mmol/L    Calcium 9.4 8.6 - 10.5 mg/dL    Total Protein 7.1 6.0 - 8.5 g/dL    Albumin 4.5 3.5 - 5.2 g/dL    ALT (SGPT) 14 1 - 33 U/L    AST (SGOT) 15 1 - 32 U/L    Alkaline Phosphatase 140 (H) 39 - 117 U/L    Total Bilirubin 0.3 0.0 - 1.2 mg/dL    Globulin 2.6 gm/dL    A/G Ratio 1.7 g/dL    BUN/Creatinine Ratio 22.5 7.0 - 25.0    Anion Gap 12.0 5.0 - 15.0 mmol/L    eGFR 76.2 >60.0 mL/min/1.73   Lipase    Specimen: Blood   Result Value Ref Range    Lipase 43 13 - 60 U/L   BNP    Specimen: Blood   Result Value Ref Range    proBNP 85.6 0.0 - 900.0 pg/mL   CBC Auto Differential    Specimen: Blood   Result Value Ref Range    WBC 11.61 (H) 3.40 - 10.80 10*3/mm3    RBC  4.14 3.77 - 5.28 10*6/mm3    Hemoglobin 13.8 12.0 - 15.9 g/dL    Hematocrit 41.9 34.0 - 46.6 %    .2 (H) 79.0 - 97.0 fL    MCH 33.3 (H) 26.6 - 33.0 pg    MCHC 32.9 31.5 - 35.7 g/dL    RDW 13.1 12.3 - 15.4 %    RDW-SD 49.1 37.0 - 54.0 fl    MPV 9.4 6.0 - 12.0 fL    Platelets 222 140 - 450 10*3/mm3    Neutrophil % 63.9 42.7 - 76.0 %    Lymphocyte % 28.2 19.6 - 45.3 %    Monocyte % 5.7 5.0 - 12.0 %    Eosinophil % 1.4 0.3 - 6.2 %    Basophil % 0.5 0.0 - 1.5 %    Immature Grans % 0.3 0.0 - 0.5 %    Neutrophils, Absolute 7.42 (H) 1.70 - 7.00 10*3/mm3    Lymphocytes, Absolute 3.27 (H) 0.70 - 3.10 10*3/mm3    Monocytes, Absolute 0.66 0.10 - 0.90 10*3/mm3    Eosinophils, Absolute 0.16 0.00 - 0.40 10*3/mm3    Basophils, Absolute 0.06 0.00 - 0.20 10*3/mm3    Immature Grans, Absolute 0.04 0.00 - 0.05 10*3/mm3    nRBC 0.0 0.0 - 0.2 /100 WBC   High Sensitivity Troponin T 2Hr    Specimen: Blood   Result Value Ref Range    HS Troponin T <6 <14 ng/L    Troponin T Delta     ECG 12 Lead ED Triage Standing Order; Chest Pain   Result Value Ref Range    QT Interval 426 ms    QTC Interval 469 ms   ECG 12 Lead ED Triage Standing Order; Chest Pain   Result Value Ref Range    QT Interval 418 ms    QTC Interval 444 ms   Green Top (Gel)   Result Value Ref Range    Extra Tube Hold for add-ons.    Lavender Top   Result Value Ref Range    Extra Tube hold for add-on    Gold Top - SST   Result Value Ref Range    Extra Tube Hold for add-ons.    Gray Top   Result Value Ref Range    Extra Tube Hold for add-ons.    Light Blue Top   Result Value Ref Range    Extra Tube Hold for add-ons.        If labs were ordered, I independently reviewed the results and considered them in treating the patient.    RADIOLOGY  XR Chest 1 View   Final Result   Impression:   No active disease         Electronically Signed: Ha Cortes MD     12/24/2023 12:57 AM EST     Workstation ID: [id number]        [x] Radiologist's Report Reviewed:  I ordered and independently  interpreted the above noted radiographic studies.  See radiologist's dictation for official interpretation.      PROCEDURES    Procedures    ECG 12 Lead ED Triage Standing Order; Chest Pain   Final Result   Test Reason : CHEST PAIN   Blood Pressure :   */*   mmHG   Vent. Rate :  68 BPM     Atrial Rate :  68 BPM      P-R Int : 126 ms          QRS Dur :  84 ms       QT Int : 418 ms       P-R-T Axes :  72  42  57 degrees      QTc Int : 444 ms      Normal sinus rhythm   Normal ECG   When compared with ECG of 23-DEC-2023 23:42, (Unconfirmed)   Sinus rhythm has replaced Electronic atrial pacemaker   ST no longer depressed in Anterior leads   Nonspecific T wave abnormality no longer evident in Inferior leads   T wave inversion no longer evident in Anterolateral leads   Confirmed by Nikita Monsivais ([id number]) on 12/25/2023 12:54:40 AM      Referred By: MD ED           Confirmed By: Nikita Monsivais      ECG 12 Lead ED Triage Standing Order; Chest Pain   Final Result   Test Reason : ED Triage Standing Order~   Blood Pressure :   */*   mmHG   Vent. Rate :  73 BPM     Atrial Rate :  73 BPM      P-R Int : 222 ms          QRS Dur :  74 ms       QT Int : 426 ms       P-R-T Axes :  70  15 145 degrees      QTc Int : 469 ms      Atrial-paced rhythm with prolonged AV conduction   Abnormal ECG   When compared with ECG of 05-SEP-2023 14:24,   Electronic atrial pacemaker has replaced Sinus rhythm   ST now depressed in Anterior leads   Nonspecific T wave abnormality now evident in Inferior leads   T wave inversion now evident in Anterolateral leads   Confirmed by Nikita Monsivais [id number]) on 12/25/2023 12:53:51 AM      Referred By: ROBERTMD           Confirmed By: Nikita Monsivais          MEDICATIONS GIVEN IN ER    Medications   aluminum-magnesium hydroxide-simethicone (MAALOX MAX) 400-400-40 MG/5ML suspension 15 mL (15 mL Oral Given 12/24/23 0203)       MEDICAL DECISION MAKING, PROGRESS, and CONSULTS   Medical Decision Making  Problems  Addressed:  Acute nonspecific chest pain with low risk of coronary artery disease: acute illness or injury    Risk  OTC drugs.        All labs have been independently reviewed by me.  All radiology studies have been interpreted by me and the radiologist dictating the report.  All EKG's have been independently interpreted by me as well as and overseeing attending physician.    [] Discussed with radiology regarding test interpretation:    Discussion below represents my analysis of pertinent findings related to patient's condition, differential diagnosis, treatment plan and final disposition.    Differential diagnosis:  The differential diagnosis associated with the patient's presentation includes: Congestive heart failure (volume overload), acute coronary syndrome (STEMI/NSTEMI), pneumothorax, intercostal muscle strains, costochondritis, pneumonia, URI and GERD.     Additional sources  Discussed/ obtained information from independent historians:   [] Spouse  [] Parent  [] Family member  [] Friend  [] EMS   [] Other:  External (non-ED) record review:   [] Inpatient record:   [] Office record:   [] Outpatient record:   [] Prior Outpatient labs:   [] Prior Outpatient radiology:   [] Primary Care record:   [] Outside ED record:   [] Other:   Patient's care impacted by:   [] Diabetes  [] Hypertension  [] Hyperlipidemia  [] Hypothyroidism   [] Coronary Artery Disease   [] COPD   [] Cancer   [] Obesity  [x] GERD   [] Tobacco Abuse   [] Substance Abuse    [x] Anxiety   [x] Depression   [] Other:   Care significantly affected by Social Determinants of Health (housing and economic circumstances, unemployment)    [] Yes     [x] No   If yes, Patient's care significantly limited by  Social Determinants of Health including:   [] Inadequate housing   [] Low income   [] Alcoholism and drug addiction in family   [] Problems related to primary support group   [] Unemployment   [] Problems related to employment   [] Other Social  Determinants of Health:     Shared decision making:  I had a discussion with the patient/family regarding diagnosis, diagnostic results, treatment plan, and medications.  The patient/family indicated understanding of these instructions.  I spent adequate time at the bedside preceding discharge necessary to personally discuss the aftercare instructions, giving patient education, providing explanations of the results of our evaluations/findings, and my decision making to assure that the patient/family understand the plan of care.  Time was allotted to answer questions at that time and throughout the ED course.  Emphasis was placed on timely follow-up after discharge.  I also discussed the potential for the development of an acute emergent condition requiring further evaluation, admission, or even surgical intervention. I discussed that we found nothing during the visit today indicating the need for further workup, admission, or the presence of an unstable medical condition.  I encouraged the patient to return to the emergency department immediately for ANY concerns, worsening, new complaints, or if symptoms persist and unable to seek follow-up in a timely fashion.  The patient/family expressed understanding and agreement with this plan.  The patient will follow-up with primary care and cardiology for reevaluation.      Orders placed during this visit:  Orders Placed This Encounter   Procedures    COVID PRE-OP / PRE-PROCEDURE SCREENING ORDER (NO ISOLATION) - Swab, Nasopharynx    Respiratory Panel PCR w/COVID-19(SARS-CoV-2) [facility] In-House, NP Swab in UTM/VTM, 2 HR TAT - Swab, Nasopharynx    XR Chest 1 View    [place] Draw    High Sensitivity Troponin T    Comprehensive Metabolic Panel    Lipase    BNP    CBC Auto Differential    High Sensitivity Troponin T 2Hr    Continuous Pulse Oximetry    ECG 12 Lead ED Triage Standing Order; Chest Pain    CBC & Differential    Green Top (Gel)    Lavender Top     Gold Top - [facility]    Gray Top    Light Blue Top     ED Course:    ED Course as of 01/01/24 1139   Sun Dec 24, 2023   0252 This patient presents with chest pain, with symptoms suggestive of noncardiac chest pain. History without high risk features. Minimal CAD risk factors. Exam without evidence of volume overload, BNP normal. EKG without signs of active ischemia. Initial and two hour troponin negative. Chest imaging demonstrates no acte acute cardiopulmonary process. HEART score: 1. Presentation not consistent with acute PE (PERC negative). Patient has follow up with cardiology scheduled. Based on clinical presentation and workup in ED including labs and imaging, no clear indication for treatment beyond symptomatic management. At time of discharge disposition patient resting comfortable, no acute distress, afebrile, nontoxic in appearance, vital signs stable and able to maintain oxygen saturation of 96% on room air.   [JG]      ED Course User Index  [JG] Juice Mead PA            DIAGNOSIS  Final diagnoses:   Acute nonspecific chest pain with low risk of coronary artery disease       DISPOSITION    ED Disposition       ED Disposition   Discharge    Condition   Stable    Comment   --               Please note that portions of this document were completed with voice recognition software.        Juice Mead PA  01/01/24 1139

## 2023-12-24 NOTE — DISCHARGE INSTRUCTIONS
Symptomatic care is recommended. Take all medications as prescribed and instructed. Follow up with cardiology as scheduled or return to Emergency Department with worsening of symptoms.

## 2023-12-25 LAB
QT INTERVAL: 418 MS
QT INTERVAL: 426 MS
QTC INTERVAL: 444 MS
QTC INTERVAL: 469 MS

## 2024-01-03 NOTE — PROGRESS NOTES
"[facility]  [facility]    Chief Complaint  Follow-up and Chest Pain    Subjective    History of Present Illness {CC  Problem List  Visit  Diagnosis   Encounters  Notes  Medications  Labs  Result Review Imaging  Media :23}     Lisa Limon is a 58 y.o. female with chronic chest pain, palpitations, anxiety, tobacco abuse, GERD, sleep apnea who presents today for ongoing evaluation of chest pain.    Patient went to ED on 12/23/2023 with acute midsternal and upper back pain while she was talking on the phone with her friend. It was worse when she got up and walked around.  She also reported increasing fatigue and sore throat as well as intermittent cough. HS troponins normal, labs benign and EKG stable. She also was in the ED in September with left sided chest pain that happened after she ate a bagel. She tested positive for mono. Troponins were normal.     She continues to have intermittent chest pains sometimes sharp and occurs at rest and with activity. Also notes intermittent heart flutters which she has had in the past.  Has worn multiple monitors in the past for this.  Symptomatic rare PACs in the past       Cardiac risks: tobacco abuse (has cut down to 0.25ppd), age, family hx (father had MI at age 56)    Objective     Vital Signs:   Vitals:    01/08/24 1338   BP: 124/81   BP Location: Left arm   Pulse: 73   Resp: 18   Temp: 98.8 °F (37.1 °C)   TempSrc: Temporal   SpO2: 96%   Weight: 72.3 kg (159 lb 6 oz)   Height: 162.6 cm (64\")     Body mass index is 27.36 kg/m².  Physical Exam  Vitals reviewed.   Constitutional:       Appearance: Normal appearance.   HENT:      Head: Normocephalic.   Neck:      Vascular: No carotid bruit.   Cardiovascular:      Rate and Rhythm: Normal rate and regular rhythm.      Pulses: Normal pulses.      Heart sounds: Normal heart sounds, S1 normal and S2 normal. No murmur heard.  Pulmonary:      Effort: Pulmonary effort is normal. No " respiratory distress.      Breath sounds: Normal breath sounds.   Chest:      Chest wall: No tenderness.   Abdominal:      General: Abdomen is flat.      Palpations: Abdomen is soft.   Musculoskeletal:      Cervical back: Neck supple.      Right lower leg: No edema.      Left lower leg: No edema.   Skin:     General: Skin is warm and dry.   Neurological:      General: No focal deficit present.      Mental Status: She is alert and oriented to person, place, and time. Mental status is at baseline.   Psychiatric:         Mood and Affect: Mood normal.         Behavior: Behavior normal.         Thought Content: Thought content normal.              Result Review  Data Reviewed:{ Labs  Result Review  Imaging  Med Tab  Media :23}   High Sensitivity Troponin T 2Hr (12/24/2023 02:00)  COVID PRE-OP / PRE-PROCEDURE SCREENING ORDER (NO ISOLATION) - Swab, Nasopharynx (12/24/2023 00:07)  BNP (12/23/2023 23:58)  Comprehensive Metabolic Panel (12/23/2023 23:58)  Lipase (12/23/2023 23:58)  CBC & Differential (12/23/2023 23:58)  High Sensitivity Troponin T (12/23/2023 23:58)  ECG 12 Lead ED Triage Standing Order; Chest Pain (12/24/2023 01:51)  Stress Test With Myocardial Perfusion One Day (02/28/2019 08:25)  Adult Transthoracic Echo Complete W/ Cont if Necessary Per Protocol (02/28/2019 09:56)         Assessment and Plan {CC Problem List  Visit Diagnosis  ROS  Review (Popup)  Health Maintenance  Quality  BestPractice  Medications  SmartSets  SnapShot Encounters  Media :23}   1. Exertional chest pain  - Multiple ASCVD risks, recommend ischemic evaluation since she does have exertional symptoms  - Stress Test With Myocardial Perfusion (1 Day); Future    2. Family history of premature CAD  - Tobacco cessation encouraged  -Recommend coronary calcium score if stress test is normal  - Stress Test With Myocardial Perfusion (1 Day); Future    3. Tobacco abuse  -Cessation encouraged.  We discussed the possibility that some of  her chest pains could be coronary spasms.  Advised to call if symptoms persist and will consider Imdur.  For now she would prefer to avoid any medications  - Stress Test With Myocardial Perfusion (1 Day); Future          Follow Up {Instructions Charge Capture  Follow-up Communications :23}   Return if symptoms worsen or fail to improve.    Patient was given instructions and counseling regarding her condition or for health maintenance advice. Please see specific information pulled into the AVS if appropriate.  Advised to call the [facility] with any questions, concerns, or worsening symptoms.

## 2024-01-08 ENCOUNTER — OFFICE VISIT (OUTPATIENT)
Dept: CARDIOLOGY | Facility: HOSPITAL | Age: 59
End: 2024-01-08
Payer: MEDICAID

## 2024-01-08 VITALS
HEIGHT: 64 IN | DIASTOLIC BLOOD PRESSURE: 81 MMHG | HEART RATE: 73 BPM | OXYGEN SATURATION: 96 % | TEMPERATURE: 98.8 F | WEIGHT: 159.38 LBS | SYSTOLIC BLOOD PRESSURE: 124 MMHG | BODY MASS INDEX: 27.21 KG/M2 | RESPIRATION RATE: 18 BRPM

## 2024-01-08 DIAGNOSIS — R07.9 EXERTIONAL CHEST PAIN: Primary | ICD-10-CM

## 2024-01-08 DIAGNOSIS — Z82.49 FAMILY HISTORY OF PREMATURE CAD: ICD-10-CM

## 2024-01-08 DIAGNOSIS — Z72.0 TOBACCO ABUSE: ICD-10-CM

## 2024-01-08 RX ORDER — ASCORBIC ACID 500 MG
1000 TABLET ORAL DAILY
COMMUNITY

## 2024-01-08 RX ORDER — ASPIRIN 81 MG/1
81 TABLET, CHEWABLE ORAL AS NEEDED
COMMUNITY

## 2024-01-10 ENCOUNTER — HOSPITAL ENCOUNTER (OUTPATIENT)
Dept: CARDIOLOGY | Facility: HOSPITAL | Age: 59
Discharge: HOME | End: 2024-01-10
Payer: COMMERCIAL

## 2024-01-10 VITALS
HEART RATE: 72 BPM | OXYGEN SATURATION: 97 % | WEIGHT: 159.39 LBS | SYSTOLIC BLOOD PRESSURE: 142 MMHG | HEIGHT: 64 IN | BODY MASS INDEX: 27.21 KG/M2 | DIASTOLIC BLOOD PRESSURE: 86 MMHG

## 2024-01-10 DIAGNOSIS — R07.9 EXERTIONAL CHEST PAIN: ICD-10-CM

## 2024-01-10 DIAGNOSIS — Z72.0 TOBACCO ABUSE: ICD-10-CM

## 2024-01-10 DIAGNOSIS — Z82.49 FAMILY HISTORY OF PREMATURE CAD: ICD-10-CM

## 2024-01-10 LAB
BH CV REST NUCLEAR ISOTOPE DOSE: 9.8 MCI
BH CV STRESS BP STAGE 1: NORMAL
BH CV STRESS BP STAGE 2: NORMAL
BH CV STRESS BP STAGE 3: NORMAL
BH CV STRESS DURATION MIN STAGE 1: 3
BH CV STRESS DURATION MIN STAGE 2: 3
BH CV STRESS DURATION MIN STAGE 3: 3
BH CV STRESS DURATION SEC STAGE 1: 0
BH CV STRESS DURATION SEC STAGE 2: 0
BH CV STRESS DURATION SEC STAGE 3: 25
BH CV STRESS GRADE STAGE 1: 10
BH CV STRESS GRADE STAGE 2: 12
BH CV STRESS GRADE STAGE 3: 14
BH CV STRESS HR STAGE 1: 99
BH CV STRESS HR STAGE 2: 110
BH CV STRESS HR STAGE 3: 142
BH CV STRESS METS STAGE 1: 5
BH CV STRESS METS STAGE 2: 7.5
BH CV STRESS METS STAGE 3: 10
BH CV STRESS NUCLEAR ISOTOPE DOSE: 33 MCI
BH CV STRESS O2 STAGE 1: 96
BH CV STRESS O2 STAGE 2: 97
BH CV STRESS O2 STAGE 3: 93
BH CV STRESS PROTOCOL 1: NORMAL
BH CV STRESS RECOVERY BP: NORMAL MMHG
BH CV STRESS RECOVERY HR: 80 BPM
BH CV STRESS RECOVERY O2: 98 %
BH CV STRESS SPEED STAGE 1: 1.7
BH CV STRESS SPEED STAGE 2: 2.5
BH CV STRESS SPEED STAGE 3: 3.4
BH CV STRESS STAGE 1: 1
BH CV STRESS STAGE 2: 2
BH CV STRESS STAGE 3: 3
LV EF NUC BP: 80 %
MAXIMAL PREDICTED HEART RATE: 164 BPM
PERCENT MAX PREDICTED HR: 86.59 %
STRESS BASELINE BP: NORMAL MMHG
STRESS BASELINE HR: 72 BPM
STRESS O2 SAT REST: 97 %
STRESS PERCENT HR: 102 %
STRESS POST ESTIMATED WORKLOAD: 10.1 METS
STRESS POST EXERCISE DUR MIN: 9 MIN
STRESS POST EXERCISE DUR SEC: 25 SEC
STRESS POST O2 SAT PEAK: 97 %
STRESS POST PEAK BP: NORMAL MMHG
STRESS POST PEAK HR: 142 BPM
STRESS TARGET HR: 139 BPM

## 2024-01-10 PROCEDURE — 78452 HT MUSCLE IMAGE SPECT MULT: CPT

## 2024-01-10 PROCEDURE — 93017 CV STRESS TEST TRACING ONLY: CPT

## 2024-01-10 PROCEDURE — 0 TECHNETIUM SESTAMIBI: Performed by: NURSE PRACTITIONER

## 2024-01-10 PROCEDURE — A9500 TC99M SESTAMIBI: HCPCS | Performed by: NURSE PRACTITIONER

## 2024-01-10 RX ADMIN — TECHNETIUM TC 99M SESTAMIBI 1 DOSE: 1 INJECTION INTRAVENOUS at 07:34

## 2024-01-10 RX ADMIN — TECHNETIUM TC 99M SESTAMIBI 1 DOSE: 1 INJECTION INTRAVENOUS at 09:30

## 2025-01-06 ENCOUNTER — TELEMEDICINE (OUTPATIENT)
Dept: FAMILY MEDICINE CLINIC | Facility: TELEHEALTH | Age: 60
End: 2025-01-06
Payer: COMMERCIAL

## 2025-01-06 DIAGNOSIS — H66.001 ACUTE SUPPURATIVE OTITIS MEDIA OF RIGHT EAR WITHOUT SPONTANEOUS RUPTURE OF TYMPANIC MEMBRANE, RECURRENCE NOT SPECIFIED: Primary | ICD-10-CM

## 2025-01-06 PROCEDURE — 99213 OFFICE O/P EST LOW 20 MIN: CPT | Performed by: NURSE PRACTITIONER

## 2025-01-06 RX ORDER — PREDNISONE 20 MG/1
20 TABLET ORAL 2 TIMES DAILY
Qty: 12 TABLET | Refills: 0 | Status: SHIPPED | OUTPATIENT
Start: 2025-01-06 | End: 2025-01-12

## 2025-01-06 RX ORDER — MICONAZOLE NITRATE 2 %
1 CREAM WITH APPLICATOR VAGINAL NIGHTLY
Qty: 45 G | Refills: 0 | Status: SHIPPED | OUTPATIENT
Start: 2025-01-06

## 2025-01-06 RX ORDER — ALBUTEROL SULFATE 90 UG/1
2 INHALANT RESPIRATORY (INHALATION) EVERY 4 HOURS PRN
Qty: 18 G | Refills: 0 | Status: SHIPPED | OUTPATIENT
Start: 2025-01-06

## 2025-01-06 RX ORDER — DOXYCYCLINE 100 MG/1
100 CAPSULE ORAL 2 TIMES DAILY
Qty: 20 CAPSULE | Refills: 0 | Status: SHIPPED | OUTPATIENT
Start: 2025-01-06 | End: 2025-01-16

## 2025-01-06 NOTE — LETTER
January 6, 2025     Patient: Lisa Limon   YOB: 1965   Date of Visit: 1/6/2025       To Whom It May Concern:    It is my medical opinion that Lisa Limon may return to work on Monday, January 13, 2025.  Please excuse her absence from work on Monday, January 6 through Thursday, January 9, 2025 due to illness.             Sincerely,        SUSAN Abraham    CC: No Recipients

## 2025-01-06 NOTE — PROGRESS NOTES
You have chosen to receive care through a telehealth visit.  Do you consent to use a video/audio connection for your medical care today? Yes     Patient or patient representative verbalized consent for the use of Ambient Listening during the visit with  SUSAN Abraham for chart documentation. 1/6/2025  13:20 EST    CHIEF COMPLAINT  No chief complaint on file.        \[facility] [place] [facility]\""  Lisa Limon is a 59 y.o. female  presents with complaint of    History of Present Illness  The patient is a 59-year-old female who presents via virtual visit with complaints of ear pain.    She initially sought medical attention last Thursday due to severe ear pain, headache, and mucus production. Despite being excused from work until today, her symptoms have significantly worsened over the weekend. She reports excessive mucus production, predominantly clear but occasionally exhibiting a goldish hue, similar to her experience during a COVID-19 infection in 2022. She has not been tested for COVID-19 recently. Her symptoms began on Wednesday. She also reports body aches and a sore throat, which was noted to be erythematous upon examination. A mono blood test was conducted, but both the strep and mono tests returned negative results. She does not require an inhaler at this time.    She expresses concern about potential side effects of medications, particularly antibiotics, due to past experiences of adverse reactions. She recalls a severe yeast infection following a course of Z-Delvin for COVID-19 and pneumonia in January 2022. Additionally, she developed a widespread rash from her knees to her scalp after taking Diflucan, which was initially attributed to COVID-19 but later suspected to be a drug reaction. She is uncertain if she has taken cefdinir before without any problem. She has previously been prescribed Z-Delvin and doxycycline, both of which caused anxiety.    ALLERGIES  The patient has had a reaction to DIFLUCAN, but it is unclear if it  was an allergic reaction or related to COVID-19.    MEDICATIONS  Past: Z-Delvin, DIFLUCAN       Review of Systems  See HPI    Past Medical History:   Diagnosis Date    Acid reflux     Anxiety     Colon polyps     Depression     Gall stones     Ovarian cyst     Pancreatitis     Ulcer     UTI (urinary tract infection)        Family History   Problem Relation Age of Onset    Migraines Mother     Heart attack Father     Diabetes Paternal Aunt     Diabetes Paternal Uncle     No Known Problems Sister     Stroke Brother     No Known Problems Maternal Grandmother     No Known Problems Maternal Grandfather     No Known Problems Paternal Grandmother     No Known Problems Paternal Grandfather     Diabetes Other        Social History     Socioeconomic History    Marital status:    Tobacco Use    Smoking status: Every Day     Current packs/day: 0.25     Average packs/day: 0.3 packs/day for 40.0 years (10.0 ttl pk-yrs)     Types: Cigarettes     Start date: 1985     Passive exposure: Current    Smokeless tobacco: Never   Vaping Use    Vaping status: Never Used   Substance and Sexual Activity    Alcohol use: No    Drug use: Never    Sexual activity: Maia Gonzalez Mary Limon  reports that she has been smoking cigarettes. She started smoking about 40 years ago. She has a 10 pack-year smoking history. She has been exposed to tobacco smoke. She has never used smokeless tobacco.             LMP  (LMP Unknown)     PHYSICAL EXAM  Physical Exam   Constitutional: She is oriented to person, place, and time. She appears well-developed and well-nourished. She does not have a sickly appearance. She appears ill.   HENT:   Head: Normocephalic and atraumatic.   Pulmonary/Chest: Effort normal.  No respiratory distress (persistent cough during visit; inspriatory wheezing noted).  Neurological: She is alert and oriented to person, place, and time.           Diagnoses and all orders for this visit:    1. Acute suppurative otitis media of  right ear without spontaneous rupture of tympanic membrane, recurrence not specified (Primary)  -     miconazole (MICOTIN) 2 % vaginal cream; Insert 1 applicator into the vagina Every Night.  Dispense: 45 g; Refill: 0  -     predniSONE (DELTASONE) 20 MG tablet; Take 1 tablet by mouth 2 (Two) Times a Day for 6 days.  Dispense: 12 tablet; Refill: 0  -     doxycycline (MONODOX) 100 MG capsule; Take 1 capsule by mouth 2 (Two) Times a Day for 10 days.  Dispense: 20 capsule; Refill: 0  -     albuterol sulfate  (90 Base) MCG/ACT inhaler; Inhale 2 puffs Every 4 (Four) Hours As Needed for Wheezing or Shortness of Air (cough).  Dispense: 18 g; Refill: 0    --take medications as prescribed  --increase fluids, rest as needed, tylenol or ibuprofen for pain  --f/u in 5-7 days if no improvement      Assessment & Plan  1. Ear Infection.  Symptoms include ear pain, headache, and significant mucus production. The mucus is described as goldish in color. A COVID-19 test was not performed, and it is too late to treat for flu. A prescription for doxycycline, to be taken twice daily for 10 days, has been issued. Additionally, prednisone has been prescribed, to be taken twice daily for 6 days. An inhaler has been provided for use as needed, with a dosage of 2 puffs every 4 hours for symptoms of wheezing, shortness of breath, or cough. All prescriptions will be sent to [facility] in [facility]. If there is no improvement in her condition, she is advised to seek further evaluation at an urgent care facility.    2. Potential Yeast Infection.  The patient has a history of developing yeast infections when taking antibiotics. Miconazole vaginal cream has been prescribed to be used concurrently with the antibiotic to prevent a yeast infection. She is instructed to start the vaginal cream when she begins the antibiotic.         FOLLOW-UP  As discussed during visit with PCP/[facility] if no improvement or Urgent Care/Emergency  Department if worsening of symptoms    Patient verbalizes understanding of medication dosage, comfort measures, instructions for treatment and follow-up.    Radha SINGHKatherine Kothari, APRN  01/06/2025  13:20 EST    Mode of Visit: Video  Location of patient: -HOME-  Location of provider: +HOME+  You have chosen to receive care through a telehealth visit.  The patient has signed the video visit consent form.  The visit included audio and video interaction. No technical issues occurred during this visit.      Note Disclaimer: At [facility], we believe that sharing information builds trust and better   relationships. You are receiving this note because you recently visited [facility]. It is possible you   will see health information before a provider has talked with you about it. This kind of information can   be easy to misunderstand. To help you fully understand what it means for your health, we urge you to   discuss this note with your provider.

## 2025-01-06 NOTE — PATIENT INSTRUCTIONS
Otitis Media, Adult    Otitis media occurs when there is inflammation and fluid in the middle ear with signs and symptoms of an acute infection. The middle ear is a part of the ear that contains bones for hearing as well as air that helps send sounds to the brain. When infected fluid builds up in this space, it causes pressure and can lead to an ear infection. The eustachian tube connects the middle ear to the back of the nose (nasopharynx) and normally allows air into the middle ear. If the eustachian tube becomes blocked, fluid can build up and become infected.  What are the causes?  This condition is caused by a blockage in the eustachian tube. This can be caused by mucus or by swelling of the tube. Problems that can cause a blockage include:  A cold or other upper respiratory infection.  Allergies.  An irritant, such as tobacco smoke.  Enlarged adenoids. The adenoids are areas of soft tissue located high in the back of the throat, behind the nose and the roof of the mouth. They are part of the body's defense system (immune system).  A mass in the nasopharynx.  Damage to the ear caused by pressure changes (barotrauma).  What increases the risk?  You are more likely to develop this condition if you:  Smoke or are exposed to tobacco smoke.  Have an opening in the roof of your mouth (cleft palate).  Have gastroesophageal reflux.  Have an immune system disorder.  What are the signs or symptoms?  Symptoms of this condition include:  Ear pain.  Fever.  Decreased hearing.  Tiredness (lethargy).  Fluid leaking from the ear, if the eardrum is ruptured or has burst.  Ringing in the ear.  How is this diagnosed?    This condition is diagnosed with a physical exam. During the exam, your health care provider will use an instrument called an otoscope to look in your ear and check for redness, swelling, and fluid. He or she will also ask about your symptoms.  Your health care provider may also order tests, such as:  A pneumatic  otoscopy. This is a test to check the movement of the eardrum. It is done by squeezing a small amount of air into the ear.  A tympanogram. This is a test that shows how well the eardrum moves in response to air pressure in the ear canal. It provides a graph for your health care provider to review.  How is this treated?  This condition can go away on its own within 3-5 days. But if the condition is caused by a bacterial infection and does not go away on its own, or if it keeps coming back, your health care provider may:  Prescribe antibiotic medicine to treat the infection.  Prescribe or recommend medicines to control pain.  Follow these instructions at home:  Take over-the-counter and prescription medicines only as told by your health care provider.  If you were prescribed an antibiotic medicine, take it as told by your health care provider. Do not stop taking the antibiotic even if you start to feel better.  Keep all follow-up visits. This is important.  Contact a health care provider if:  You have bleeding from your nose.  There is a lump on your neck.  You are not feeling better in 5 days.  You feel worse instead of better.  Get help right away if:  You have severe pain that is not controlled with medicine.  You have swelling, redness, or pain around your ear.  You have stiffness in your neck.  A part of your face is not moving (paralyzed).  The bone behind your ear (mastoid bone) is tender when you touch it.  You develop a severe headache.  Summary  Otitis media is redness, soreness, and swelling of the middle ear, usually resulting in pain and decreased hearing.  This condition can go away on its own within 3-5 days.  If the problem does not go away in 3-5 days, your health care provider may give you medicines to treat the infection.  If you were prescribed an antibiotic medicine, take it as told by your health care provider.  Follow all instructions that were given to you by your health care provider.  This  information is not intended to replace advice given to you by your health care provider. Make sure you discuss any questions you have with your health care provider.  Document Revised: 03/28/2022 Document Reviewed: 03/28/2022  [employer] Patient Education © 2024 [employer]  Cough, Adult  Coughing is a reflex that clears your throat and airways (respiratory system). It helps heal and protect your lungs. It is normal to cough from time to time. A cough that happens with other symptoms or that lasts a long time may be a sign of a condition that needs treatment. A short-term (acute) cough may only last 2-3 weeks. A long-term (chronic) cough may last 8 or more weeks.  Coughing is often caused by:  Diseases, such as:  An infection of the respiratory system.  Asthma or other heart or lung diseases.  Gastroesophageal reflux. This is when acid comes back up from the stomach.  Breathing in things that irritate your lungs.  Allergies.  Postnasal drip. This is when mucus runs down the back of your throat.  Smoking.  Some medicines.  Follow these instructions at home:  Medicines  Take over-the-counter and prescription medicines only as told by your health care provider.  Talk with your provider before you take cough medicine (cough suppressants).  Eating and drinking  Do not drink alcohol.  Avoid caffeine.  Drink enough fluid to keep your pee (urine) pale yellow.  Lifestyle  Avoid cigarette smoke.  Do not use any products that contain nicotine or tobacco. These products include cigarettes, chewing tobacco, and vaping devices, such as e-cigarettes. If you need help quitting, ask your provider.  Avoid things that make you cough. These may include perfumes, candles, cleaning products, or campfire smoke.  General instructions    Watch for any changes to your cough. Tell your provider about them.  Always cover your mouth when you cough.  If the air is dry in your bedroom or home, use a cool mist vaporizer or humidifier.  If your  cough is worse at night, try to sleep in a semi-upright position.  Rest as needed.  Contact a health care provider if:  You have new symptoms, or your symptoms get worse.  You cough up pus.  You have a fever that does not go away or a cough that does not get better after 2-3 weeks.  You cannot control your cough with medicine, and you are losing sleep.  You have pain that gets worse or is not helped with medicine.  You lose weight for no clear reason.  You have night sweats.  Get help right away if:  You cough up blood.  You have trouble breathing.  Your heart is beating very fast.  These symptoms may be an emergency. Get help right away. Call 911.  Do not wait to see if the symptoms will go away.  Do not drive yourself to the hospital.  This information is not intended to replace advice given to you by your health care provider. Make sure you discuss any questions you have with your health care provider.  Document Revised: 08/18/2023 Document Reviewed: 08/18/2023  Else[employer] Patient Education © 2024 [employer]

## 2025-01-14 ENCOUNTER — PATIENT ROUNDING (BHMG ONLY) (OUTPATIENT)
Dept: URGENT CARE | Facility: CLINIC | Age: 60
End: 2025-01-14
Payer: COMMERCIAL

## 2025-01-14 NOTE — ED NOTES
Thank you for letting us care for you in your recent visit to our urgent care center. We would love to hear about your experience with us. Was this the first time you have visited our location?    We’re always looking for ways to make our patients’ experiences even better. Do you have any recommendations on ways we may improve?     I appreciate you taking the time to respond. Please be on the lookout for a survey about your recent visit from [employer] via text or email. We would greatly appreciate if you could fill that out and turn it back in. We want your voice to be heard and we value your feedback.   Thank you for choosing [facility] for your healthcare needs.